# Patient Record
Sex: MALE | Race: BLACK OR AFRICAN AMERICAN | NOT HISPANIC OR LATINO | Employment: UNEMPLOYED | URBAN - METROPOLITAN AREA
[De-identification: names, ages, dates, MRNs, and addresses within clinical notes are randomized per-mention and may not be internally consistent; named-entity substitution may affect disease eponyms.]

---

## 2017-01-11 ENCOUNTER — ALLSCRIPTS OFFICE VISIT (OUTPATIENT)
Dept: OTHER | Facility: OTHER | Age: 37
End: 2017-01-11

## 2017-01-12 ENCOUNTER — ALLSCRIPTS OFFICE VISIT (OUTPATIENT)
Dept: OTHER | Facility: OTHER | Age: 37
End: 2017-01-12

## 2017-01-19 ENCOUNTER — HOSPITAL ENCOUNTER (OUTPATIENT)
Dept: RADIOLOGY | Facility: HOSPITAL | Age: 37
Discharge: HOME | End: 2017-01-19
Attending: INTERNAL MEDICINE | Admitting: INTERNAL MEDICINE
Payer: COMMERCIAL

## 2017-01-23 ENCOUNTER — GENERIC CONVERSION - ENCOUNTER (OUTPATIENT)
Dept: OTHER | Facility: OTHER | Age: 37
End: 2017-01-23

## 2017-02-08 ENCOUNTER — ALLSCRIPTS OFFICE VISIT (OUTPATIENT)
Dept: OTHER | Facility: OTHER | Age: 37
End: 2017-02-08

## 2017-02-16 ENCOUNTER — GENERIC CONVERSION - ENCOUNTER (OUTPATIENT)
Dept: OTHER | Facility: OTHER | Age: 37
End: 2017-02-16

## 2017-03-08 ENCOUNTER — ALLSCRIPTS OFFICE VISIT (OUTPATIENT)
Dept: OTHER | Facility: OTHER | Age: 37
End: 2017-03-08

## 2017-03-30 ENCOUNTER — HOSPITAL ENCOUNTER (EMERGENCY)
Facility: HOSPITAL | Age: 37
Discharge: HOME | End: 2017-03-30
Attending: EMERGENCY MEDICINE | Admitting: EMERGENCY MEDICINE
Payer: COMMERCIAL

## 2017-03-30 VITALS
OXYGEN SATURATION: 98 % | DIASTOLIC BLOOD PRESSURE: 83 MMHG | TEMPERATURE: 98 F | SYSTOLIC BLOOD PRESSURE: 134 MMHG | WEIGHT: 178.57 LBS | HEART RATE: 70 BPM | BODY MASS INDEX: 21.74 KG/M2 | RESPIRATION RATE: 18 BRPM

## 2017-03-30 DIAGNOSIS — F19.20 DEPENDENCY ON PAIN MEDICATION (HCC): Primary | ICD-10-CM

## 2017-03-30 PROCEDURE — 99283 EMERGENCY DEPT VISIT LOW MDM: CPT

## 2017-03-30 RX ORDER — FENTANYL 50 UG/H
1 PATCH TRANSDERMAL
COMMUNITY
End: 2017-11-06 | Stop reason: HOSPADM

## 2017-03-31 ENCOUNTER — GENERIC CONVERSION - ENCOUNTER (OUTPATIENT)
Dept: OTHER | Facility: OTHER | Age: 37
End: 2017-03-31

## 2017-04-03 ENCOUNTER — ALLSCRIPTS OFFICE VISIT (OUTPATIENT)
Dept: OTHER | Facility: OTHER | Age: 37
End: 2017-04-03

## 2017-04-26 ENCOUNTER — APPOINTMENT (OUTPATIENT)
Dept: LAB | Facility: CLINIC | Age: 37
End: 2017-04-26
Payer: COMMERCIAL

## 2017-04-26 ENCOUNTER — ALLSCRIPTS OFFICE VISIT (OUTPATIENT)
Dept: OTHER | Facility: OTHER | Age: 37
End: 2017-04-26

## 2017-04-26 ENCOUNTER — TRANSCRIBE ORDERS (OUTPATIENT)
Dept: LAB | Facility: CLINIC | Age: 37
End: 2017-04-26

## 2017-04-26 DIAGNOSIS — C34.91 MALIGNANT NEOPLASM OF UNSPECIFIED PART OF RIGHT BRONCHUS OR LUNG (HCC): ICD-10-CM

## 2017-04-26 LAB
ALBUMIN SERPL BCP-MCNC: 3.6 G/DL (ref 3.5–5)
ALP SERPL-CCNC: 131 U/L (ref 46–116)
ALT SERPL W P-5'-P-CCNC: 38 U/L (ref 12–78)
ANION GAP SERPL CALCULATED.3IONS-SCNC: 8 MMOL/L (ref 4–13)
AST SERPL W P-5'-P-CCNC: 27 U/L (ref 5–45)
BASOPHILS # BLD AUTO: 0.03 THOUSANDS/ΜL (ref 0–0.1)
BASOPHILS NFR BLD AUTO: 0 % (ref 0–1)
BILIRUB SERPL-MCNC: 0.2 MG/DL (ref 0.2–1)
BUN SERPL-MCNC: 23 MG/DL (ref 5–25)
CALCIUM SERPL-MCNC: 9.1 MG/DL (ref 8.3–10.1)
CHLORIDE SERPL-SCNC: 103 MMOL/L (ref 100–108)
CO2 SERPL-SCNC: 28 MMOL/L (ref 21–32)
CREAT SERPL-MCNC: 1.07 MG/DL (ref 0.6–1.3)
EOSINOPHIL # BLD AUTO: 0.3 THOUSAND/ΜL (ref 0–0.61)
EOSINOPHIL NFR BLD AUTO: 4 % (ref 0–6)
ERYTHROCYTE [DISTWIDTH] IN BLOOD BY AUTOMATED COUNT: 18.6 % (ref 11.6–15.1)
GFR SERPL CREATININE-BSD FRML MDRD: >60 ML/MIN/1.73SQ M
GLUCOSE SERPL-MCNC: 98 MG/DL (ref 65–140)
HCT VFR BLD AUTO: 36.8 % (ref 36.5–49.3)
HGB BLD-MCNC: 12.1 G/DL (ref 12–17)
LYMPHOCYTES # BLD AUTO: 2.84 THOUSANDS/ΜL (ref 0.6–4.47)
LYMPHOCYTES NFR BLD AUTO: 39 % (ref 14–44)
MCH RBC QN AUTO: 22.8 PG (ref 26.8–34.3)
MCHC RBC AUTO-ENTMCNC: 32.9 G/DL (ref 31.4–37.4)
MCV RBC AUTO: 69 FL (ref 82–98)
MONOCYTES # BLD AUTO: 0.82 THOUSAND/ΜL (ref 0.17–1.22)
MONOCYTES NFR BLD AUTO: 11 % (ref 4–12)
NEUTROPHILS # BLD AUTO: 3.37 THOUSANDS/ΜL (ref 1.85–7.62)
NEUTS SEG NFR BLD AUTO: 46 % (ref 43–75)
PLATELET # BLD AUTO: 252 THOUSANDS/UL (ref 149–390)
PMV BLD AUTO: 11 FL (ref 8.9–12.7)
POTASSIUM SERPL-SCNC: 4 MMOL/L (ref 3.5–5.3)
PROT SERPL-MCNC: 7.5 G/DL (ref 6.4–8.2)
RBC # BLD AUTO: 5.3 MILLION/UL (ref 3.88–5.62)
SODIUM SERPL-SCNC: 139 MMOL/L (ref 136–145)
WBC # BLD AUTO: 7.36 THOUSAND/UL (ref 4.31–10.16)

## 2017-04-26 PROCEDURE — 80053 COMPREHEN METABOLIC PANEL: CPT

## 2017-04-26 PROCEDURE — 85025 COMPLETE CBC W/AUTO DIFF WBC: CPT

## 2017-04-26 PROCEDURE — 36415 COLL VENOUS BLD VENIPUNCTURE: CPT

## 2017-04-28 ENCOUNTER — HOSPITAL ENCOUNTER (EMERGENCY)
Facility: HOSPITAL | Age: 37
Discharge: HOME | End: 2017-04-28
Attending: EMERGENCY MEDICINE | Admitting: EMERGENCY MEDICINE
Payer: COMMERCIAL

## 2017-04-28 ENCOUNTER — APPOINTMENT (EMERGENCY)
Dept: CT IMAGING | Facility: HOSPITAL | Age: 37
End: 2017-04-28
Payer: COMMERCIAL

## 2017-04-28 VITALS
WEIGHT: 182.98 LBS | OXYGEN SATURATION: 100 % | HEIGHT: 77 IN | SYSTOLIC BLOOD PRESSURE: 129 MMHG | RESPIRATION RATE: 18 BRPM | DIASTOLIC BLOOD PRESSURE: 73 MMHG | BODY MASS INDEX: 21.61 KG/M2 | TEMPERATURE: 98.7 F | HEART RATE: 62 BPM

## 2017-04-28 DIAGNOSIS — C79.70: ICD-10-CM

## 2017-04-28 DIAGNOSIS — R10.12 ABDOMINAL PAIN, LEFT UPPER QUADRANT: Primary | ICD-10-CM

## 2017-04-28 LAB
ALBUMIN SERPL BCP-MCNC: 3.9 G/DL (ref 3.5–5)
ALP SERPL-CCNC: 143 U/L (ref 46–116)
ALT SERPL W P-5'-P-CCNC: 39 U/L (ref 12–78)
ANION GAP SERPL CALCULATED.3IONS-SCNC: 9 MMOL/L (ref 4–13)
APTT PPP: 29 SECONDS (ref 23–35)
AST SERPL W P-5'-P-CCNC: 55 U/L (ref 5–45)
BASOPHILS # BLD AUTO: 0.03 THOUSANDS/ΜL (ref 0–0.1)
BASOPHILS NFR BLD AUTO: 0 % (ref 0–1)
BILIRUB DIRECT SERPL-MCNC: 0.03 MG/DL (ref 0–0.2)
BILIRUB SERPL-MCNC: 0.5 MG/DL (ref 0.2–1)
BUN SERPL-MCNC: 17 MG/DL (ref 5–25)
CALCIUM SERPL-MCNC: 9.8 MG/DL (ref 8.3–10.1)
CHLORIDE SERPL-SCNC: 98 MMOL/L (ref 100–108)
CO2 SERPL-SCNC: 27 MMOL/L (ref 21–32)
CREAT SERPL-MCNC: 0.69 MG/DL (ref 0.6–1.3)
EOSINOPHIL # BLD AUTO: 0.11 THOUSAND/ΜL (ref 0–0.61)
EOSINOPHIL NFR BLD AUTO: 2 % (ref 0–6)
ERYTHROCYTE [DISTWIDTH] IN BLOOD BY AUTOMATED COUNT: 18.2 % (ref 11.6–15.1)
GFR SERPL CREATININE-BSD FRML MDRD: >60 ML/MIN/1.73SQ M
GLUCOSE SERPL-MCNC: 93 MG/DL (ref 65–140)
HCT VFR BLD AUTO: 40.2 % (ref 36.5–49.3)
HGB BLD-MCNC: 13.2 G/DL (ref 12–17)
INR PPP: 0.98 (ref 0.86–1.16)
LACTATE SERPL-SCNC: 1 MMOL/L (ref 0.5–2)
LIPASE SERPL-CCNC: 94 U/L (ref 73–393)
LYMPHOCYTES # BLD AUTO: 1.66 THOUSANDS/ΜL (ref 0.6–4.47)
LYMPHOCYTES NFR BLD AUTO: 23 % (ref 14–44)
MCH RBC QN AUTO: 22.6 PG (ref 26.8–34.3)
MCHC RBC AUTO-ENTMCNC: 32.8 G/DL (ref 31.4–37.4)
MCV RBC AUTO: 69 FL (ref 82–98)
MONOCYTES # BLD AUTO: 0.63 THOUSAND/ΜL (ref 0.17–1.22)
MONOCYTES NFR BLD AUTO: 9 % (ref 4–12)
NEUTROPHILS # BLD AUTO: 4.79 THOUSANDS/ΜL (ref 1.85–7.62)
NEUTS SEG NFR BLD AUTO: 66 % (ref 43–75)
PLATELET # BLD AUTO: 260 THOUSANDS/UL (ref 149–390)
PMV BLD AUTO: 11.3 FL (ref 8.9–12.7)
POTASSIUM SERPL-SCNC: 6 MMOL/L (ref 3.5–5.3)
PROT SERPL-MCNC: 8.5 G/DL (ref 6.4–8.2)
PROTHROMBIN TIME: 13.3 SECONDS (ref 12.1–14.4)
RBC # BLD AUTO: 5.84 MILLION/UL (ref 3.88–5.62)
SODIUM SERPL-SCNC: 134 MMOL/L (ref 136–145)
WBC # BLD AUTO: 7.22 THOUSAND/UL (ref 4.31–10.16)

## 2017-04-28 PROCEDURE — 96375 TX/PRO/DX INJ NEW DRUG ADDON: CPT

## 2017-04-28 PROCEDURE — 96374 THER/PROPH/DIAG INJ IV PUSH: CPT

## 2017-04-28 PROCEDURE — 85730 THROMBOPLASTIN TIME PARTIAL: CPT | Performed by: EMERGENCY MEDICINE

## 2017-04-28 PROCEDURE — 80076 HEPATIC FUNCTION PANEL: CPT | Performed by: EMERGENCY MEDICINE

## 2017-04-28 PROCEDURE — 87040 BLOOD CULTURE FOR BACTERIA: CPT | Performed by: EMERGENCY MEDICINE

## 2017-04-28 PROCEDURE — 96361 HYDRATE IV INFUSION ADD-ON: CPT

## 2017-04-28 PROCEDURE — 96376 TX/PRO/DX INJ SAME DRUG ADON: CPT

## 2017-04-28 PROCEDURE — 74177 CT ABD & PELVIS W/CONTRAST: CPT

## 2017-04-28 PROCEDURE — 36415 COLL VENOUS BLD VENIPUNCTURE: CPT | Performed by: EMERGENCY MEDICINE

## 2017-04-28 PROCEDURE — 83690 ASSAY OF LIPASE: CPT | Performed by: EMERGENCY MEDICINE

## 2017-04-28 PROCEDURE — 83605 ASSAY OF LACTIC ACID: CPT | Performed by: EMERGENCY MEDICINE

## 2017-04-28 PROCEDURE — 80048 BASIC METABOLIC PNL TOTAL CA: CPT | Performed by: EMERGENCY MEDICINE

## 2017-04-28 PROCEDURE — 85025 COMPLETE CBC W/AUTO DIFF WBC: CPT | Performed by: EMERGENCY MEDICINE

## 2017-04-28 PROCEDURE — 99284 EMERGENCY DEPT VISIT MOD MDM: CPT

## 2017-04-28 PROCEDURE — 85610 PROTHROMBIN TIME: CPT | Performed by: EMERGENCY MEDICINE

## 2017-04-28 RX ORDER — ONDANSETRON 2 MG/ML
4 INJECTION INTRAMUSCULAR; INTRAVENOUS ONCE
Status: COMPLETED | OUTPATIENT
Start: 2017-04-28 | End: 2017-04-28

## 2017-04-28 RX ORDER — MAGNESIUM HYDROXIDE/ALUMINUM HYDROXICE/SIMETHICONE 120; 1200; 1200 MG/30ML; MG/30ML; MG/30ML
30 SUSPENSION ORAL ONCE
Status: COMPLETED | OUTPATIENT
Start: 2017-04-28 | End: 2017-04-28

## 2017-04-28 RX ADMIN — ONDANSETRON 4 MG: 2 INJECTION INTRAMUSCULAR; INTRAVENOUS at 10:38

## 2017-04-28 RX ADMIN — HYDROMORPHONE HYDROCHLORIDE 1 MG: 1 INJECTION, SOLUTION INTRAMUSCULAR; INTRAVENOUS; SUBCUTANEOUS at 12:03

## 2017-04-28 RX ADMIN — LIDOCAINE HYDROCHLORIDE 15 ML: 20 SOLUTION ORAL; TOPICAL at 12:03

## 2017-04-28 RX ADMIN — IOHEXOL 100 ML: 350 INJECTION, SOLUTION INTRAVENOUS at 11:10

## 2017-04-28 RX ADMIN — HYDROMORPHONE HYDROCHLORIDE 1 MG: 1 INJECTION, SOLUTION INTRAMUSCULAR; INTRAVENOUS; SUBCUTANEOUS at 10:38

## 2017-04-28 RX ADMIN — SODIUM CHLORIDE 1000 ML: 0.9 INJECTION, SOLUTION INTRAVENOUS at 10:35

## 2017-04-28 RX ADMIN — ALUMINUM HYDROXIDE, MAGNESIUM HYDROXIDE, AND SIMETHICONE 30 ML: 200; 200; 20 SUSPENSION ORAL at 12:02

## 2017-04-30 ENCOUNTER — APPOINTMENT (EMERGENCY)
Dept: CT IMAGING | Facility: HOSPITAL | Age: 37
End: 2017-04-30
Payer: COMMERCIAL

## 2017-04-30 ENCOUNTER — HOSPITAL ENCOUNTER (OUTPATIENT)
Facility: HOSPITAL | Age: 37
Setting detail: OBSERVATION
Discharge: HOME | End: 2017-04-30
Attending: EMERGENCY MEDICINE | Admitting: FAMILY MEDICINE
Payer: COMMERCIAL

## 2017-04-30 VITALS
RESPIRATION RATE: 16 BRPM | BODY MASS INDEX: 21.72 KG/M2 | WEIGHT: 183.2 LBS | TEMPERATURE: 99.1 F | OXYGEN SATURATION: 98 % | SYSTOLIC BLOOD PRESSURE: 131 MMHG | HEART RATE: 78 BPM | DIASTOLIC BLOOD PRESSURE: 75 MMHG

## 2017-04-30 DIAGNOSIS — K52.9 GASTROENTERITIS: Primary | ICD-10-CM

## 2017-04-30 LAB
ALBUMIN SERPL BCP-MCNC: 4.3 G/DL (ref 3.5–5)
ALP SERPL-CCNC: 154 U/L (ref 46–116)
ALT SERPL W P-5'-P-CCNC: 34 U/L (ref 12–78)
ANION GAP SERPL CALCULATED.3IONS-SCNC: 11 MMOL/L (ref 4–13)
AST SERPL W P-5'-P-CCNC: 39 U/L (ref 5–45)
BASOPHILS # BLD AUTO: 0.03 THOUSANDS/ΜL (ref 0–0.1)
BASOPHILS NFR BLD AUTO: 0 % (ref 0–1)
BILIRUB SERPL-MCNC: 0.4 MG/DL (ref 0.2–1)
BUN SERPL-MCNC: 12 MG/DL (ref 5–25)
CALCIUM SERPL-MCNC: 10 MG/DL (ref 8.3–10.1)
CHLORIDE SERPL-SCNC: 99 MMOL/L (ref 100–108)
CO2 SERPL-SCNC: 28 MMOL/L (ref 21–32)
CREAT SERPL-MCNC: 0.89 MG/DL (ref 0.6–1.3)
EOSINOPHIL # BLD AUTO: 0.19 THOUSAND/ΜL (ref 0–0.61)
EOSINOPHIL NFR BLD AUTO: 2 % (ref 0–6)
ERYTHROCYTE [DISTWIDTH] IN BLOOD BY AUTOMATED COUNT: 18.9 % (ref 11.6–15.1)
GFR SERPL CREATININE-BSD FRML MDRD: >60 ML/MIN/1.73SQ M
GLUCOSE SERPL-MCNC: 86 MG/DL (ref 65–140)
HCT VFR BLD AUTO: 41.2 % (ref 36.5–49.3)
HGB BLD-MCNC: 13.8 G/DL (ref 12–17)
HOLD SPECIMEN: NORMAL
HOLD SPECIMEN: NORMAL
HOLD SPECIMEN: YES
LIPASE SERPL-CCNC: 81 U/L (ref 73–393)
LYMPHOCYTES # BLD AUTO: 2.55 THOUSANDS/ΜL (ref 0.6–4.47)
LYMPHOCYTES NFR BLD AUTO: 29 % (ref 14–44)
MCH RBC QN AUTO: 23.1 PG (ref 26.8–34.3)
MCHC RBC AUTO-ENTMCNC: 33.5 G/DL (ref 31.4–37.4)
MCV RBC AUTO: 69 FL (ref 82–98)
MONOCYTES # BLD AUTO: 0.96 THOUSAND/ΜL (ref 0.17–1.22)
MONOCYTES NFR BLD AUTO: 11 % (ref 4–12)
NEUTROPHILS # BLD AUTO: 5.18 THOUSANDS/ΜL (ref 1.85–7.62)
NEUTS SEG NFR BLD AUTO: 58 % (ref 43–75)
PLATELET # BLD AUTO: 276 THOUSANDS/UL (ref 149–390)
PMV BLD AUTO: 10.2 FL (ref 8.9–12.7)
POTASSIUM SERPL-SCNC: 4.1 MMOL/L (ref 3.5–5.3)
PROT SERPL-MCNC: 8.8 G/DL (ref 6.4–8.2)
RBC # BLD AUTO: 5.98 MILLION/UL (ref 3.88–5.62)
SODIUM SERPL-SCNC: 138 MMOL/L (ref 136–145)
WBC # BLD AUTO: 8.91 THOUSAND/UL (ref 4.31–10.16)

## 2017-04-30 PROCEDURE — 96375 TX/PRO/DX INJ NEW DRUG ADDON: CPT

## 2017-04-30 PROCEDURE — 87899 AGENT NOS ASSAY W/OPTIC: CPT | Performed by: EMERGENCY MEDICINE

## 2017-04-30 PROCEDURE — 87015 SPECIMEN INFECT AGNT CONCNTJ: CPT | Performed by: EMERGENCY MEDICINE

## 2017-04-30 PROCEDURE — 99285 EMERGENCY DEPT VISIT HI MDM: CPT

## 2017-04-30 PROCEDURE — 80053 COMPREHEN METABOLIC PANEL: CPT | Performed by: EMERGENCY MEDICINE

## 2017-04-30 PROCEDURE — 83690 ASSAY OF LIPASE: CPT | Performed by: EMERGENCY MEDICINE

## 2017-04-30 PROCEDURE — 74177 CT ABD & PELVIS W/CONTRAST: CPT

## 2017-04-30 PROCEDURE — 85025 COMPLETE CBC W/AUTO DIFF WBC: CPT | Performed by: EMERGENCY MEDICINE

## 2017-04-30 PROCEDURE — 87045 FECES CULTURE AEROBIC BACT: CPT | Performed by: EMERGENCY MEDICINE

## 2017-04-30 PROCEDURE — 36415 COLL VENOUS BLD VENIPUNCTURE: CPT

## 2017-04-30 PROCEDURE — 87493 C DIFF AMPLIFIED PROBE: CPT | Performed by: EMERGENCY MEDICINE

## 2017-04-30 PROCEDURE — 87046 STOOL CULTR AEROBIC BACT EA: CPT | Performed by: EMERGENCY MEDICINE

## 2017-04-30 PROCEDURE — 89055 LEUKOCYTE ASSESSMENT FECAL: CPT | Performed by: EMERGENCY MEDICINE

## 2017-04-30 PROCEDURE — 87205 SMEAR GRAM STAIN: CPT | Performed by: EMERGENCY MEDICINE

## 2017-04-30 PROCEDURE — 96374 THER/PROPH/DIAG INJ IV PUSH: CPT

## 2017-04-30 RX ORDER — PROMETHAZINE HYDROCHLORIDE 25 MG/ML
12.5 INJECTION, SOLUTION INTRAMUSCULAR; INTRAVENOUS ONCE
Status: COMPLETED | OUTPATIENT
Start: 2017-04-30 | End: 2017-04-30

## 2017-04-30 RX ORDER — PROMETHAZINE HYDROCHLORIDE 25 MG/ML
12.5 INJECTION, SOLUTION INTRAMUSCULAR; INTRAVENOUS EVERY 6 HOURS PRN
Status: DISCONTINUED | OUTPATIENT
Start: 2017-04-30 | End: 2017-04-30

## 2017-04-30 RX ORDER — PROMETHAZINE HYDROCHLORIDE 25 MG/1
25 TABLET ORAL EVERY 6 HOURS PRN
Qty: 30 TABLET | Refills: 0 | Status: SHIPPED | OUTPATIENT
Start: 2017-04-30 | End: 2017-07-21 | Stop reason: ALTCHOICE

## 2017-04-30 RX ORDER — MIRTAZAPINE 7.5 MG/1
7.5 TABLET, FILM COATED ORAL
COMMUNITY
End: 2017-07-21 | Stop reason: ALTCHOICE

## 2017-04-30 RX ORDER — PROMETHAZINE HYDROCHLORIDE 25 MG/ML
12.5 INJECTION, SOLUTION INTRAMUSCULAR; INTRAVENOUS ONCE
Status: DISCONTINUED | OUTPATIENT
Start: 2017-04-30 | End: 2017-04-30

## 2017-04-30 RX ORDER — PROMETHAZINE HYDROCHLORIDE 6.25 MG/5ML
12.5 SYRUP ORAL ONCE
Status: COMPLETED | OUTPATIENT
Start: 2017-04-30 | End: 2017-04-30

## 2017-04-30 RX ADMIN — IOHEXOL 100 ML: 350 INJECTION, SOLUTION INTRAVENOUS at 19:50

## 2017-04-30 RX ADMIN — PROMETHAZINE HYDROCHLORIDE 12.5 MG: 25 INJECTION INTRAMUSCULAR; INTRAVENOUS at 17:26

## 2017-04-30 RX ADMIN — HYDROMORPHONE HYDROCHLORIDE 0.5 MG: 1 INJECTION, SOLUTION INTRAMUSCULAR; INTRAVENOUS; SUBCUTANEOUS at 17:39

## 2017-04-30 RX ADMIN — PROMETHAZINE HYDROCHLORIDE 12.5 MG: 6.25 SOLUTION ORAL at 19:30

## 2017-04-30 RX ADMIN — IOHEXOL 50 ML: 240 INJECTION, SOLUTION INTRATHECAL; INTRAVASCULAR; INTRAVENOUS; ORAL at 18:00

## 2017-05-01 ENCOUNTER — ALLSCRIPTS OFFICE VISIT (OUTPATIENT)
Dept: OTHER | Facility: OTHER | Age: 37
End: 2017-05-01

## 2017-05-01 LAB
C DIFF TOX GENS STL QL NAA+PROBE: NORMAL
WBC STL QL MICRO: NORMAL

## 2017-05-02 RX ORDER — SODIUM CHLORIDE 9 MG/ML
75 INJECTION, SOLUTION INTRAVENOUS CONTINUOUS
Status: CANCELLED | OUTPATIENT
Start: 2017-05-02

## 2017-05-03 LAB
BACTERIA BLD CULT: NORMAL
BACTERIA BLD CULT: NORMAL
BACTERIA STL CULT: NORMAL
BACTERIA STL CULT: NORMAL

## 2017-05-04 LAB — WBC SPEC QL GRAM STN: NORMAL

## 2017-05-09 ENCOUNTER — HOSPITAL ENCOUNTER (OUTPATIENT)
Dept: CT IMAGING | Facility: HOSPITAL | Age: 37
Discharge: HOME | End: 2017-05-09
Attending: INTERNAL MEDICINE
Payer: COMMERCIAL

## 2017-06-01 ENCOUNTER — ALLSCRIPTS OFFICE VISIT (OUTPATIENT)
Dept: OTHER | Facility: OTHER | Age: 37
End: 2017-06-01

## 2017-06-29 ENCOUNTER — ALLSCRIPTS OFFICE VISIT (OUTPATIENT)
Dept: OTHER | Facility: OTHER | Age: 37
End: 2017-06-29

## 2017-07-21 ENCOUNTER — APPOINTMENT (INPATIENT)
Dept: CT IMAGING | Facility: HOSPITAL | Age: 37
DRG: 861 | End: 2017-07-21
Payer: COMMERCIAL

## 2017-07-21 ENCOUNTER — APPOINTMENT (EMERGENCY)
Dept: RADIOLOGY | Facility: HOSPITAL | Age: 37
DRG: 861 | End: 2017-07-21
Payer: COMMERCIAL

## 2017-07-21 ENCOUNTER — ANESTHESIA EVENT (OUTPATIENT)
Dept: GASTROENTEROLOGY | Facility: HOSPITAL | Age: 37
End: 2017-07-21

## 2017-07-21 ENCOUNTER — HOSPITAL ENCOUNTER (INPATIENT)
Facility: HOSPITAL | Age: 37
LOS: 1 days | Discharge: LEFT BEFORE DISCHARGE | DRG: 861 | End: 2017-07-22
Attending: EMERGENCY MEDICINE | Admitting: HOSPITALIST
Payer: COMMERCIAL

## 2017-07-21 ENCOUNTER — APPOINTMENT (EMERGENCY)
Dept: CT IMAGING | Facility: HOSPITAL | Age: 37
DRG: 861 | End: 2017-07-21
Payer: COMMERCIAL

## 2017-07-21 DIAGNOSIS — K62.5 BLOOD PER RECTUM: ICD-10-CM

## 2017-07-21 DIAGNOSIS — G89.3 CANCER RELATED PAIN: ICD-10-CM

## 2017-07-21 DIAGNOSIS — R11.2 NAUSEA AND VOMITING: Primary | ICD-10-CM

## 2017-07-21 PROBLEM — M25.50 ARTHRALGIA: Status: ACTIVE | Noted: 2017-07-21

## 2017-07-21 PROBLEM — R10.9 ABDOMINAL PAIN: Status: ACTIVE | Noted: 2017-07-21

## 2017-07-21 PROBLEM — C79.70 MALIGNANT NEOPLASM METASTATIC TO ADRENAL GLAND (HCC): Status: ACTIVE | Noted: 2017-07-21

## 2017-07-21 PROBLEM — M79.2 NEUROPATHIC PAIN SYNDROME (NON-HERPETIC): Status: ACTIVE | Noted: 2017-07-21

## 2017-07-21 PROBLEM — F32.A DEPRESSION: Status: ACTIVE | Noted: 2017-07-21

## 2017-07-21 PROBLEM — F11.20 CONTINUOUS OPIOID DEPENDENCE (HCC): Status: ACTIVE | Noted: 2017-07-21

## 2017-07-21 PROBLEM — G47.00 INSOMNIA: Status: ACTIVE | Noted: 2017-07-21

## 2017-07-21 PROBLEM — R76.8 ELEVATED RHEUMATOID FACTOR: Status: ACTIVE | Noted: 2017-07-21

## 2017-07-21 PROBLEM — R63.0 DECREASE IN APPETITE: Status: ACTIVE | Noted: 2017-07-21

## 2017-07-21 PROBLEM — F41.9 ANXIETY: Status: ACTIVE | Noted: 2017-07-21

## 2017-07-21 PROBLEM — Z51.5 UNDER CARE OF PALLIATIVE CARE PHYSICIAN: Status: ACTIVE | Noted: 2017-07-21

## 2017-07-21 PROBLEM — D62 ACUTE BLOOD LOSS ANEMIA: Status: ACTIVE | Noted: 2017-07-21

## 2017-07-21 PROBLEM — M54.9 BACK PAIN: Status: ACTIVE | Noted: 2017-07-21

## 2017-07-21 PROBLEM — C34.90 ADENOCARCINOMA OF LUNG (HCC): Status: ACTIVE | Noted: 2017-07-21

## 2017-07-21 PROBLEM — R11.0 NAUSEA: Status: ACTIVE | Noted: 2017-07-21

## 2017-07-21 LAB
ABO GROUP BLD: NORMAL
ALBUMIN SERPL BCP-MCNC: 3.4 G/DL (ref 3.5–5)
ALP SERPL-CCNC: 127 U/L (ref 46–116)
ALT SERPL W P-5'-P-CCNC: 30 U/L (ref 12–78)
ANION GAP SERPL CALCULATED.3IONS-SCNC: 8 MMOL/L (ref 4–13)
AST SERPL W P-5'-P-CCNC: 27 U/L (ref 5–45)
BASOPHILS # BLD AUTO: 0.02 THOUSANDS/ΜL (ref 0–0.1)
BASOPHILS NFR BLD AUTO: 0 % (ref 0–1)
BILIRUB SERPL-MCNC: 0.2 MG/DL (ref 0.2–1)
BILIRUB UR QL STRIP: NEGATIVE
BLD GP AB SCN SERPL QL: NEGATIVE
BUN SERPL-MCNC: 13 MG/DL (ref 5–25)
CALCIUM SERPL-MCNC: 9.5 MG/DL (ref 8.3–10.1)
CHLORIDE SERPL-SCNC: 102 MMOL/L (ref 100–108)
CLARITY UR: CLEAR
CO2 SERPL-SCNC: 28 MMOL/L (ref 21–32)
COLOR UR: YELLOW
CREAT SERPL-MCNC: 0.82 MG/DL (ref 0.6–1.3)
EOSINOPHIL # BLD AUTO: 0.18 THOUSAND/ΜL (ref 0–0.61)
EOSINOPHIL NFR BLD AUTO: 3 % (ref 0–6)
ERYTHROCYTE [DISTWIDTH] IN BLOOD BY AUTOMATED COUNT: 17.7 % (ref 11.6–15.1)
GFR SERPL CREATININE-BSD FRML MDRD: >60 ML/MIN/1.73SQ M
GLUCOSE SERPL-MCNC: 93 MG/DL (ref 65–140)
GLUCOSE UR STRIP-MCNC: NEGATIVE MG/DL
HCT VFR BLD AUTO: 34.7 % (ref 36.5–49.3)
HCT VFR BLD AUTO: 35.2 % (ref 36.5–49.3)
HGB BLD-MCNC: 11.4 G/DL (ref 12–17)
HGB BLD-MCNC: 11.7 G/DL (ref 12–17)
HGB UR QL STRIP.AUTO: NEGATIVE
KETONES UR STRIP-MCNC: NEGATIVE MG/DL
LACTATE SERPL-SCNC: 1.7 MMOL/L (ref 0.5–2)
LEUKOCYTE ESTERASE UR QL STRIP: NEGATIVE
LIPASE SERPL-CCNC: 213 U/L (ref 73–393)
LYMPHOCYTES # BLD AUTO: 1.66 THOUSANDS/ΜL (ref 0.6–4.47)
LYMPHOCYTES NFR BLD AUTO: 30 % (ref 14–44)
MCH RBC QN AUTO: 22.6 PG (ref 26.8–34.3)
MCHC RBC AUTO-ENTMCNC: 32.9 G/DL (ref 31.4–37.4)
MCV RBC AUTO: 69 FL (ref 82–98)
MONOCYTES # BLD AUTO: 0.43 THOUSAND/ΜL (ref 0.17–1.22)
MONOCYTES NFR BLD AUTO: 8 % (ref 4–12)
NEUTROPHILS # BLD AUTO: 3.23 THOUSANDS/ΜL (ref 1.85–7.62)
NEUTS SEG NFR BLD AUTO: 59 % (ref 43–75)
NITRITE UR QL STRIP: NEGATIVE
PH UR STRIP.AUTO: 7.5 [PH] (ref 4.5–8)
PLATELET # BLD AUTO: 329 THOUSANDS/UL (ref 149–390)
PMV BLD AUTO: 9.6 FL (ref 8.9–12.7)
POTASSIUM SERPL-SCNC: 4.1 MMOL/L (ref 3.5–5.3)
PROT SERPL-MCNC: 7.7 G/DL (ref 6.4–8.2)
PROT UR STRIP-MCNC: NEGATIVE MG/DL
RBC # BLD AUTO: 5.05 MILLION/UL (ref 3.88–5.62)
RH BLD: POSITIVE
SODIUM SERPL-SCNC: 138 MMOL/L (ref 136–145)
SP GR UR STRIP.AUTO: 1.02 (ref 1–1.03)
SPECIMEN EXPIRATION DATE: NORMAL
UROBILINOGEN UR QL STRIP.AUTO: 0.2 E.U./DL
WBC # BLD AUTO: 5.52 THOUSAND/UL (ref 4.31–10.16)

## 2017-07-21 PROCEDURE — 94760 N-INVAS EAR/PLS OXIMETRY 1: CPT

## 2017-07-21 PROCEDURE — 86900 BLOOD TYPING SEROLOGIC ABO: CPT | Performed by: PHYSICIAN ASSISTANT

## 2017-07-21 PROCEDURE — C9113 INJ PANTOPRAZOLE SODIUM, VIA: HCPCS | Performed by: FAMILY MEDICINE

## 2017-07-21 PROCEDURE — 99285 EMERGENCY DEPT VISIT HI MDM: CPT

## 2017-07-21 PROCEDURE — 85014 HEMATOCRIT: CPT | Performed by: PHYSICIAN ASSISTANT

## 2017-07-21 PROCEDURE — 83690 ASSAY OF LIPASE: CPT | Performed by: EMERGENCY MEDICINE

## 2017-07-21 PROCEDURE — 83605 ASSAY OF LACTIC ACID: CPT | Performed by: PHYSICIAN ASSISTANT

## 2017-07-21 PROCEDURE — 96376 TX/PRO/DX INJ SAME DRUG ADON: CPT

## 2017-07-21 PROCEDURE — 85018 HEMOGLOBIN: CPT | Performed by: PHYSICIAN ASSISTANT

## 2017-07-21 PROCEDURE — 86901 BLOOD TYPING SEROLOGIC RH(D): CPT | Performed by: PHYSICIAN ASSISTANT

## 2017-07-21 PROCEDURE — 82600 ASSAY OF CYANIDE: CPT | Performed by: FAMILY MEDICINE

## 2017-07-21 PROCEDURE — 96374 THER/PROPH/DIAG INJ IV PUSH: CPT

## 2017-07-21 PROCEDURE — 74022 RADEX COMPL AQT ABD SERIES: CPT

## 2017-07-21 PROCEDURE — 80053 COMPREHEN METABOLIC PANEL: CPT | Performed by: EMERGENCY MEDICINE

## 2017-07-21 PROCEDURE — 74176 CT ABD & PELVIS W/O CONTRAST: CPT

## 2017-07-21 PROCEDURE — 96375 TX/PRO/DX INJ NEW DRUG ADDON: CPT

## 2017-07-21 PROCEDURE — 36415 COLL VENOUS BLD VENIPUNCTURE: CPT | Performed by: EMERGENCY MEDICINE

## 2017-07-21 PROCEDURE — 85025 COMPLETE CBC W/AUTO DIFF WBC: CPT | Performed by: EMERGENCY MEDICINE

## 2017-07-21 PROCEDURE — 81003 URINALYSIS AUTO W/O SCOPE: CPT | Performed by: FAMILY MEDICINE

## 2017-07-21 PROCEDURE — 86850 RBC ANTIBODY SCREEN: CPT | Performed by: PHYSICIAN ASSISTANT

## 2017-07-21 PROCEDURE — C9113 INJ PANTOPRAZOLE SODIUM, VIA: HCPCS | Performed by: PHYSICIAN ASSISTANT

## 2017-07-21 PROCEDURE — 94762 N-INVAS EAR/PLS OXIMTRY CONT: CPT

## 2017-07-21 RX ORDER — GABAPENTIN 100 MG/1
100 CAPSULE ORAL 3 TIMES DAILY
COMMUNITY
End: 2017-11-06 | Stop reason: HOSPADM

## 2017-07-21 RX ORDER — PROMETHAZINE HYDROCHLORIDE 25 MG/ML
12.5 INJECTION, SOLUTION INTRAMUSCULAR; INTRAVENOUS ONCE
Status: COMPLETED | OUTPATIENT
Start: 2017-07-21 | End: 2017-07-21

## 2017-07-21 RX ORDER — PROMETHAZINE HYDROCHLORIDE 25 MG/1
25 TABLET ORAL EVERY 6 HOURS PRN
Qty: 15 TABLET | Refills: 0 | Status: SHIPPED | OUTPATIENT
Start: 2017-07-21 | End: 2017-07-22

## 2017-07-21 RX ORDER — HYDROMORPHONE HCL 110MG/55ML
2 PATIENT CONTROLLED ANALGESIA SYRINGE INTRAVENOUS
Status: DISCONTINUED | OUTPATIENT
Start: 2017-07-21 | End: 2017-07-21

## 2017-07-21 RX ORDER — OMEPRAZOLE 10 MG/1
10 CAPSULE, DELAYED RELEASE ORAL DAILY
COMMUNITY
End: 2017-10-26

## 2017-07-21 RX ORDER — NICOTINE 21 MG/24HR
14 PATCH, TRANSDERMAL 24 HOURS TRANSDERMAL DAILY
Status: DISCONTINUED | OUTPATIENT
Start: 2017-07-21 | End: 2017-07-22 | Stop reason: HOSPADM

## 2017-07-21 RX ORDER — PANTOPRAZOLE SODIUM 40 MG/1
40 INJECTION, POWDER, FOR SOLUTION INTRAVENOUS EVERY 12 HOURS SCHEDULED
Status: DISCONTINUED | OUTPATIENT
Start: 2017-07-21 | End: 2017-07-22 | Stop reason: HOSPADM

## 2017-07-21 RX ORDER — MAGNESIUM HYDROXIDE/ALUMINUM HYDROXICE/SIMETHICONE 120; 1200; 1200 MG/30ML; MG/30ML; MG/30ML
30 SUSPENSION ORAL EVERY 4 HOURS PRN
Status: DISCONTINUED | OUTPATIENT
Start: 2017-07-21 | End: 2017-07-22 | Stop reason: HOSPADM

## 2017-07-21 RX ORDER — HYDROMORPHONE HCL 110MG/55ML
1.5 PATIENT CONTROLLED ANALGESIA SYRINGE INTRAVENOUS
Status: DISCONTINUED | OUTPATIENT
Start: 2017-07-21 | End: 2017-07-22

## 2017-07-21 RX ORDER — SODIUM CHLORIDE 9 MG/ML
75 INJECTION, SOLUTION INTRAVENOUS CONTINUOUS
Status: DISCONTINUED | OUTPATIENT
Start: 2017-07-21 | End: 2017-07-21

## 2017-07-21 RX ORDER — PROMETHAZINE HYDROCHLORIDE 25 MG/ML
25 INJECTION, SOLUTION INTRAMUSCULAR; INTRAVENOUS EVERY 6 HOURS
Status: DISCONTINUED | OUTPATIENT
Start: 2017-07-21 | End: 2017-07-22 | Stop reason: HOSPADM

## 2017-07-21 RX ORDER — PANTOPRAZOLE SODIUM 40 MG/1
40 INJECTION, POWDER, FOR SOLUTION INTRAVENOUS ONCE
Status: COMPLETED | OUTPATIENT
Start: 2017-07-21 | End: 2017-07-21

## 2017-07-21 RX ORDER — FENTANYL 50 UG/H
50 PATCH TRANSDERMAL
Status: DISCONTINUED | OUTPATIENT
Start: 2017-07-21 | End: 2017-07-22 | Stop reason: HOSPADM

## 2017-07-21 RX ORDER — METOCLOPRAMIDE HYDROCHLORIDE 5 MG/ML
10 INJECTION INTRAMUSCULAR; INTRAVENOUS ONCE
Status: COMPLETED | OUTPATIENT
Start: 2017-07-21 | End: 2017-07-21

## 2017-07-21 RX ORDER — PROCHLORPERAZINE 25 MG
25 SUPPOSITORY, RECTAL RECTAL EVERY 12 HOURS PRN
Status: DISCONTINUED | OUTPATIENT
Start: 2017-07-21 | End: 2017-07-21

## 2017-07-21 RX ORDER — SODIUM CHLORIDE 9 MG/ML
125 INJECTION, SOLUTION INTRAVENOUS CONTINUOUS
Status: DISCONTINUED | OUTPATIENT
Start: 2017-07-21 | End: 2017-07-22 | Stop reason: HOSPADM

## 2017-07-21 RX ORDER — MAGNESIUM CARB/ALUMINUM HYDROX 105-160MG
296 TABLET,CHEWABLE ORAL ONCE
Status: DISCONTINUED | OUTPATIENT
Start: 2017-07-22 | End: 2017-07-21

## 2017-07-21 RX ORDER — MAGNESIUM CARB/ALUMINUM HYDROX 105-160MG
296 TABLET,CHEWABLE ORAL ONCE
Status: DISCONTINUED | OUTPATIENT
Start: 2017-07-21 | End: 2017-07-21

## 2017-07-21 RX ORDER — SODIUM CHLORIDE, SODIUM LACTATE, POTASSIUM CHLORIDE, CALCIUM CHLORIDE 600; 310; 30; 20 MG/100ML; MG/100ML; MG/100ML; MG/100ML
125 INJECTION, SOLUTION INTRAVENOUS CONTINUOUS
Status: CANCELLED | OUTPATIENT
Start: 2017-07-21

## 2017-07-21 RX ORDER — POLYETHYLENE GLYCOL 3350 17 G/17G
17 POWDER, FOR SOLUTION ORAL DAILY
Status: DISCONTINUED | OUTPATIENT
Start: 2017-07-21 | End: 2017-07-22 | Stop reason: HOSPADM

## 2017-07-21 RX ORDER — ACETAMINOPHEN 325 MG/1
650 TABLET ORAL EVERY 6 HOURS PRN
Status: DISCONTINUED | OUTPATIENT
Start: 2017-07-21 | End: 2017-07-22 | Stop reason: HOSPADM

## 2017-07-21 RX ADMIN — HYDROMORPHONE HYDROCHLORIDE 1 MG: 1 INJECTION, SOLUTION INTRAMUSCULAR; INTRAVENOUS; SUBCUTANEOUS at 11:16

## 2017-07-21 RX ADMIN — PROMETHAZINE HYDROCHLORIDE 25 MG: 25 INJECTION INTRAMUSCULAR; INTRAVENOUS at 13:50

## 2017-07-21 RX ADMIN — HYDROMORPHONE HYDROCHLORIDE 1 MG: 1 INJECTION, SOLUTION INTRAMUSCULAR; INTRAVENOUS; SUBCUTANEOUS at 10:28

## 2017-07-21 RX ADMIN — SODIUM CHLORIDE 125 ML/HR: 0.9 INJECTION, SOLUTION INTRAVENOUS at 14:36

## 2017-07-21 RX ADMIN — HYDROMORPHONE HYDROCHLORIDE 1 MG: 1 INJECTION, SOLUTION INTRAMUSCULAR; INTRAVENOUS; SUBCUTANEOUS at 13:47

## 2017-07-21 RX ADMIN — PROMETHAZINE HYDROCHLORIDE 12.5 MG: 25 INJECTION INTRAMUSCULAR; INTRAVENOUS at 12:10

## 2017-07-21 RX ADMIN — PANTOPRAZOLE SODIUM 40 MG: 40 INJECTION, POWDER, FOR SOLUTION INTRAVENOUS at 15:50

## 2017-07-21 RX ADMIN — NICOTINE 14 MG: 14 PATCH TRANSDERMAL at 22:41

## 2017-07-21 RX ADMIN — PROMETHAZINE HYDROCHLORIDE 25 MG: 25 INJECTION INTRAMUSCULAR; INTRAVENOUS at 18:45

## 2017-07-21 RX ADMIN — HYDROMORPHONE HYDROCHLORIDE 1 MG: 1 INJECTION, SOLUTION INTRAMUSCULAR; INTRAVENOUS; SUBCUTANEOUS at 13:05

## 2017-07-21 RX ADMIN — ALUMINUM HYDROXIDE, MAGNESIUM HYDROXIDE, AND SIMETHICONE 30 ML: 200; 200; 20 SUSPENSION ORAL at 18:32

## 2017-07-21 RX ADMIN — METOCLOPRAMIDE 10 MG: 5 INJECTION, SOLUTION INTRAMUSCULAR; INTRAVENOUS at 11:16

## 2017-07-21 RX ADMIN — PROMETHAZINE HYDROCHLORIDE 12.5 MG: 25 INJECTION INTRAMUSCULAR; INTRAVENOUS at 10:29

## 2017-07-21 RX ADMIN — POLYETHYLENE GLYCOL 3350 17 G: 17 POWDER, FOR SOLUTION ORAL at 17:09

## 2017-07-21 RX ADMIN — SODIUM CHLORIDE 125 ML/HR: 0.9 INJECTION, SOLUTION INTRAVENOUS at 23:23

## 2017-07-21 RX ADMIN — Medication: at 18:21

## 2017-07-21 RX ADMIN — HYDROMORPHONE HYDROCHLORIDE 2 MG: 2 INJECTION, SOLUTION INTRAMUSCULAR; INTRAVENOUS; SUBCUTANEOUS at 15:50

## 2017-07-21 RX ADMIN — HYDROMORPHONE HYDROCHLORIDE 2 MG: 2 INJECTION, SOLUTION INTRAMUSCULAR; INTRAVENOUS; SUBCUTANEOUS at 14:42

## 2017-07-21 RX ADMIN — PANTOPRAZOLE SODIUM 40 MG: 40 INJECTION, POWDER, FOR SOLUTION INTRAVENOUS at 22:41

## 2017-07-21 RX ADMIN — HYDROMORPHONE HYDROCHLORIDE 1.5 MG: 2 INJECTION, SOLUTION INTRAMUSCULAR; INTRAVENOUS; SUBCUTANEOUS at 18:58

## 2017-07-22 ENCOUNTER — ANESTHESIA (OUTPATIENT)
Dept: GASTROENTEROLOGY | Facility: HOSPITAL | Age: 37
End: 2017-07-22

## 2017-07-22 VITALS
DIASTOLIC BLOOD PRESSURE: 82 MMHG | SYSTOLIC BLOOD PRESSURE: 139 MMHG | TEMPERATURE: 97.9 F | RESPIRATION RATE: 18 BRPM | HEART RATE: 69 BPM | WEIGHT: 185.85 LBS | BODY MASS INDEX: 22.04 KG/M2 | OXYGEN SATURATION: 100 %

## 2017-07-22 LAB
ANION GAP SERPL CALCULATED.3IONS-SCNC: 9 MMOL/L (ref 4–13)
BUN SERPL-MCNC: 12 MG/DL (ref 5–25)
CALCIUM SERPL-MCNC: 9.4 MG/DL (ref 8.3–10.1)
CHLORIDE SERPL-SCNC: 103 MMOL/L (ref 100–108)
CO2 SERPL-SCNC: 26 MMOL/L (ref 21–32)
CREAT SERPL-MCNC: 0.77 MG/DL (ref 0.6–1.3)
ERYTHROCYTE [DISTWIDTH] IN BLOOD BY AUTOMATED COUNT: 17.7 % (ref 11.6–15.1)
GFR SERPL CREATININE-BSD FRML MDRD: >60 ML/MIN/1.73SQ M
GLUCOSE SERPL-MCNC: 100 MG/DL (ref 65–140)
HCT VFR BLD AUTO: 35.4 % (ref 36.5–49.3)
HCT VFR BLD AUTO: 36.3 % (ref 36.5–49.3)
HGB BLD-MCNC: 11.5 G/DL (ref 12–17)
HGB BLD-MCNC: 11.9 G/DL (ref 12–17)
MCH RBC QN AUTO: 22.5 PG (ref 26.8–34.3)
MCHC RBC AUTO-ENTMCNC: 32.8 G/DL (ref 31.4–37.4)
MCV RBC AUTO: 69 FL (ref 82–98)
PLATELET # BLD AUTO: 365 THOUSANDS/UL (ref 149–390)
PMV BLD AUTO: 10.2 FL (ref 8.9–12.7)
POTASSIUM SERPL-SCNC: 3.8 MMOL/L (ref 3.5–5.3)
RBC # BLD AUTO: 5.29 MILLION/UL (ref 3.88–5.62)
SODIUM SERPL-SCNC: 138 MMOL/L (ref 136–145)
WBC # BLD AUTO: 8.32 THOUSAND/UL (ref 4.31–10.16)

## 2017-07-22 PROCEDURE — 85027 COMPLETE CBC AUTOMATED: CPT | Performed by: PHYSICIAN ASSISTANT

## 2017-07-22 PROCEDURE — 85018 HEMOGLOBIN: CPT | Performed by: PHYSICIAN ASSISTANT

## 2017-07-22 PROCEDURE — 94762 N-INVAS EAR/PLS OXIMTRY CONT: CPT

## 2017-07-22 PROCEDURE — 94760 N-INVAS EAR/PLS OXIMETRY 1: CPT

## 2017-07-22 PROCEDURE — C9113 INJ PANTOPRAZOLE SODIUM, VIA: HCPCS | Performed by: PHYSICIAN ASSISTANT

## 2017-07-22 PROCEDURE — 85014 HEMATOCRIT: CPT | Performed by: PHYSICIAN ASSISTANT

## 2017-07-22 PROCEDURE — 80048 BASIC METABOLIC PNL TOTAL CA: CPT | Performed by: PHYSICIAN ASSISTANT

## 2017-07-22 RX ORDER — OXYCODONE HYDROCHLORIDE 10 MG/1
10 TABLET ORAL EVERY 4 HOURS PRN
Status: DISCONTINUED | OUTPATIENT
Start: 2017-07-22 | End: 2017-07-22

## 2017-07-22 RX ORDER — OXYCODONE HYDROCHLORIDE 10 MG/1
10 TABLET ORAL
Status: DISCONTINUED | OUTPATIENT
Start: 2017-07-22 | End: 2017-07-22 | Stop reason: HOSPADM

## 2017-07-22 RX ORDER — PROMETHAZINE HYDROCHLORIDE 25 MG/1
25 TABLET ORAL EVERY 6 HOURS PRN
Qty: 15 TABLET | Refills: 0 | Status: SHIPPED | OUTPATIENT
Start: 2017-07-22 | End: 2017-10-26

## 2017-07-22 RX ADMIN — HYDROMORPHONE HYDROCHLORIDE 1.5 MG: 2 INJECTION, SOLUTION INTRAMUSCULAR; INTRAVENOUS; SUBCUTANEOUS at 11:28

## 2017-07-22 RX ADMIN — ALUMINUM HYDROXIDE, MAGNESIUM HYDROXIDE, AND SIMETHICONE 30 ML: 200; 200; 20 SUSPENSION ORAL at 16:13

## 2017-07-22 RX ADMIN — OXYCODONE HYDROCHLORIDE 10 MG: 10 TABLET ORAL at 16:33

## 2017-07-22 RX ADMIN — PROMETHAZINE HYDROCHLORIDE 25 MG: 25 INJECTION INTRAMUSCULAR; INTRAVENOUS at 01:31

## 2017-07-22 RX ADMIN — HYDROMORPHONE HYDROCHLORIDE 1.5 MG: 2 INJECTION, SOLUTION INTRAMUSCULAR; INTRAVENOUS; SUBCUTANEOUS at 08:29

## 2017-07-22 RX ADMIN — SODIUM CHLORIDE 125 ML/HR: 0.9 INJECTION, SOLUTION INTRAVENOUS at 14:59

## 2017-07-22 RX ADMIN — PROMETHAZINE HYDROCHLORIDE 25 MG: 25 INJECTION INTRAMUSCULAR; INTRAVENOUS at 06:45

## 2017-07-22 RX ADMIN — OXYCODONE HYDROCHLORIDE 10 MG: 10 TABLET ORAL at 13:37

## 2017-07-22 RX ADMIN — POLYETHYLENE GLYCOL 3350 17 G: 17 POWDER, FOR SOLUTION ORAL at 08:29

## 2017-07-22 RX ADMIN — HYDROMORPHONE HYDROCHLORIDE 1.5 MG: 2 INJECTION, SOLUTION INTRAMUSCULAR; INTRAVENOUS; SUBCUTANEOUS at 05:30

## 2017-07-22 RX ADMIN — SODIUM CHLORIDE 125 ML/HR: 0.9 INJECTION, SOLUTION INTRAVENOUS at 07:12

## 2017-07-22 RX ADMIN — PANTOPRAZOLE SODIUM 40 MG: 40 INJECTION, POWDER, FOR SOLUTION INTRAVENOUS at 08:29

## 2017-07-22 RX ADMIN — HYDROMORPHONE HYDROCHLORIDE 1.5 MG: 2 INJECTION, SOLUTION INTRAMUSCULAR; INTRAVENOUS; SUBCUTANEOUS at 02:41

## 2017-07-22 RX ADMIN — PROMETHAZINE HYDROCHLORIDE 25 MG: 25 INJECTION INTRAMUSCULAR; INTRAVENOUS at 12:46

## 2017-07-25 ENCOUNTER — GENERIC CONVERSION - ENCOUNTER (OUTPATIENT)
Dept: OTHER | Facility: OTHER | Age: 37
End: 2017-07-25

## 2017-07-26 ENCOUNTER — TRANSCRIBE ORDERS (OUTPATIENT)
Dept: ADMINISTRATIVE | Facility: HOSPITAL | Age: 37
End: 2017-07-26

## 2017-07-26 ENCOUNTER — ALLSCRIPTS OFFICE VISIT (OUTPATIENT)
Dept: OTHER | Facility: OTHER | Age: 37
End: 2017-07-26

## 2017-07-26 DIAGNOSIS — C34.90 PRIMARY LUNG CANCER WITH METASTASIS FROM LUNG TO OTHER SITE, UNSPECIFIED LATERALITY (HCC): Primary | ICD-10-CM

## 2017-07-27 LAB — CYANIDE BLD-MCNC: <0.05 MG/L

## 2017-07-31 DIAGNOSIS — C34.91 MALIGNANT NEOPLASM OF UNSPECIFIED PART OF RIGHT BRONCHUS OR LUNG (HCC): ICD-10-CM

## 2017-08-21 ENCOUNTER — ALLSCRIPTS OFFICE VISIT (OUTPATIENT)
Dept: OTHER | Facility: OTHER | Age: 37
End: 2017-08-21

## 2017-08-30 ENCOUNTER — GENERIC CONVERSION - ENCOUNTER (OUTPATIENT)
Dept: OTHER | Facility: OTHER | Age: 37
End: 2017-08-30

## 2017-09-13 ENCOUNTER — ALLSCRIPTS OFFICE VISIT (OUTPATIENT)
Dept: OTHER | Facility: OTHER | Age: 37
End: 2017-09-13

## 2017-10-01 ENCOUNTER — APPOINTMENT (EMERGENCY)
Dept: CT IMAGING | Facility: HOSPITAL | Age: 37
End: 2017-10-01
Payer: COMMERCIAL

## 2017-10-01 ENCOUNTER — HOSPITAL ENCOUNTER (EMERGENCY)
Facility: HOSPITAL | Age: 37
Discharge: HOME | End: 2017-10-01
Attending: EMERGENCY MEDICINE | Admitting: EMERGENCY MEDICINE
Payer: COMMERCIAL

## 2017-10-01 ENCOUNTER — APPOINTMENT (EMERGENCY)
Dept: RADIOLOGY | Facility: HOSPITAL | Age: 37
End: 2017-10-01
Payer: COMMERCIAL

## 2017-10-01 VITALS
WEIGHT: 189 LBS | SYSTOLIC BLOOD PRESSURE: 132 MMHG | OXYGEN SATURATION: 95 % | BODY MASS INDEX: 23.5 KG/M2 | HEART RATE: 84 BPM | HEIGHT: 75 IN | TEMPERATURE: 100 F | DIASTOLIC BLOOD PRESSURE: 66 MMHG | RESPIRATION RATE: 19 BRPM

## 2017-10-01 DIAGNOSIS — R11.2 NAUSEA & VOMITING: Primary | ICD-10-CM

## 2017-10-01 DIAGNOSIS — T50.905A MEDICATION REACTION, INITIAL ENCOUNTER: ICD-10-CM

## 2017-10-01 LAB
ALBUMIN SERPL BCP-MCNC: 3.2 G/DL (ref 3.5–5)
ALP SERPL-CCNC: 145 U/L (ref 46–116)
ALT SERPL W P-5'-P-CCNC: 20 U/L (ref 12–78)
ANION GAP SERPL CALCULATED.3IONS-SCNC: 10 MMOL/L (ref 4–13)
APTT PPP: 31 SECONDS (ref 23–35)
AST SERPL W P-5'-P-CCNC: 40 U/L (ref 5–45)
BACTERIA UR QL AUTO: ABNORMAL /HPF
BASOPHILS # BLD AUTO: 0.06 THOUSANDS/ΜL (ref 0–0.1)
BASOPHILS NFR BLD AUTO: 1 % (ref 0–1)
BILIRUB SERPL-MCNC: 0.4 MG/DL (ref 0.2–1)
BILIRUB UR QL STRIP: NEGATIVE
BUN SERPL-MCNC: 13 MG/DL (ref 5–25)
CALCIUM SERPL-MCNC: 9.8 MG/DL (ref 8.3–10.1)
CHLORIDE SERPL-SCNC: 99 MMOL/L (ref 100–108)
CLARITY UR: CLEAR
CO2 SERPL-SCNC: 28 MMOL/L (ref 21–32)
COLOR UR: YELLOW
CREAT SERPL-MCNC: 0.76 MG/DL (ref 0.6–1.3)
EOSINOPHIL # BLD AUTO: 0.17 THOUSAND/ΜL (ref 0–0.61)
EOSINOPHIL NFR BLD AUTO: 2 % (ref 0–6)
ERYTHROCYTE [DISTWIDTH] IN BLOOD BY AUTOMATED COUNT: 17.3 % (ref 11.6–15.1)
GFR SERPL CREATININE-BSD FRML MDRD: 133 ML/MIN/1.73SQ M
GLUCOSE SERPL-MCNC: 98 MG/DL (ref 65–140)
GLUCOSE UR STRIP-MCNC: NEGATIVE MG/DL
HCT VFR BLD AUTO: 32.2 % (ref 36.5–49.3)
HGB BLD-MCNC: 10.1 G/DL (ref 12–17)
HGB UR QL STRIP.AUTO: NEGATIVE
INR PPP: 1.01 (ref 0.86–1.16)
KETONES UR STRIP-MCNC: NEGATIVE MG/DL
LACTATE SERPL-SCNC: 0.8 MMOL/L (ref 0.5–2)
LEUKOCYTE ESTERASE UR QL STRIP: NEGATIVE
LIPASE SERPL-CCNC: 79 U/L (ref 73–393)
LYMPHOCYTES # BLD AUTO: 1.55 THOUSANDS/ΜL (ref 0.6–4.47)
LYMPHOCYTES NFR BLD AUTO: 16 % (ref 14–44)
MCH RBC QN AUTO: 20.6 PG (ref 26.8–34.3)
MCHC RBC AUTO-ENTMCNC: 31.4 G/DL (ref 31.4–37.4)
MCV RBC AUTO: 66 FL (ref 82–98)
MONOCYTES # BLD AUTO: 0.94 THOUSAND/ΜL (ref 0.17–1.22)
MONOCYTES NFR BLD AUTO: 10 % (ref 4–12)
MUCOUS THREADS UR QL AUTO: ABNORMAL
NEUTROPHILS # BLD AUTO: 6.87 THOUSANDS/ΜL (ref 1.85–7.62)
NEUTS SEG NFR BLD AUTO: 71 % (ref 43–75)
NITRITE UR QL STRIP: NEGATIVE
NON-SQ EPI CELLS URNS QL MICRO: ABNORMAL /HPF
NRBC BLD AUTO-RTO: 0 /100 WBCS
PH UR STRIP.AUTO: 6 [PH] (ref 4.5–8)
PLATELET # BLD AUTO: 502 THOUSANDS/UL (ref 149–390)
PMV BLD AUTO: 8.8 FL (ref 8.9–12.7)
POTASSIUM SERPL-SCNC: 4 MMOL/L (ref 3.5–5.3)
PROT SERPL-MCNC: 8.1 G/DL (ref 6.4–8.2)
PROT UR STRIP-MCNC: ABNORMAL MG/DL
PROTHROMBIN TIME: 13.5 SECONDS (ref 12.1–14.4)
RBC # BLD AUTO: 4.9 MILLION/UL (ref 3.88–5.62)
RBC #/AREA URNS AUTO: ABNORMAL /HPF
SODIUM SERPL-SCNC: 137 MMOL/L (ref 136–145)
SP GR UR STRIP.AUTO: 1.02 (ref 1–1.03)
UROBILINOGEN UR QL STRIP.AUTO: 0.2 E.U./DL
WBC # BLD AUTO: 9.63 THOUSAND/UL (ref 4.31–10.16)
WBC #/AREA URNS AUTO: ABNORMAL /HPF

## 2017-10-01 PROCEDURE — 83690 ASSAY OF LIPASE: CPT | Performed by: EMERGENCY MEDICINE

## 2017-10-01 PROCEDURE — 71020 HB CHEST X-RAY 2VW FRONTAL&LATL: CPT

## 2017-10-01 PROCEDURE — 93005 ELECTROCARDIOGRAM TRACING: CPT | Performed by: EMERGENCY MEDICINE

## 2017-10-01 PROCEDURE — 36415 COLL VENOUS BLD VENIPUNCTURE: CPT | Performed by: EMERGENCY MEDICINE

## 2017-10-01 PROCEDURE — 85610 PROTHROMBIN TIME: CPT | Performed by: EMERGENCY MEDICINE

## 2017-10-01 PROCEDURE — 99284 EMERGENCY DEPT VISIT MOD MDM: CPT

## 2017-10-01 PROCEDURE — 96375 TX/PRO/DX INJ NEW DRUG ADDON: CPT

## 2017-10-01 PROCEDURE — 85025 COMPLETE CBC W/AUTO DIFF WBC: CPT | Performed by: EMERGENCY MEDICINE

## 2017-10-01 PROCEDURE — 85730 THROMBOPLASTIN TIME PARTIAL: CPT | Performed by: EMERGENCY MEDICINE

## 2017-10-01 PROCEDURE — 96374 THER/PROPH/DIAG INJ IV PUSH: CPT

## 2017-10-01 PROCEDURE — 83605 ASSAY OF LACTIC ACID: CPT | Performed by: EMERGENCY MEDICINE

## 2017-10-01 PROCEDURE — 87040 BLOOD CULTURE FOR BACTERIA: CPT | Performed by: EMERGENCY MEDICINE

## 2017-10-01 PROCEDURE — 80053 COMPREHEN METABOLIC PANEL: CPT | Performed by: EMERGENCY MEDICINE

## 2017-10-01 PROCEDURE — 81001 URINALYSIS AUTO W/SCOPE: CPT | Performed by: EMERGENCY MEDICINE

## 2017-10-01 PROCEDURE — 96361 HYDRATE IV INFUSION ADD-ON: CPT

## 2017-10-01 RX ORDER — ACETAMINOPHEN 325 MG/1
650 TABLET ORAL ONCE
Status: COMPLETED | OUTPATIENT
Start: 2017-10-01 | End: 2017-10-01

## 2017-10-01 RX ORDER — METOCLOPRAMIDE HYDROCHLORIDE 5 MG/ML
10 INJECTION INTRAMUSCULAR; INTRAVENOUS ONCE
Status: COMPLETED | OUTPATIENT
Start: 2017-10-01 | End: 2017-10-01

## 2017-10-01 RX ORDER — LORAZEPAM 2 MG/ML
1 INJECTION INTRAMUSCULAR ONCE
Status: COMPLETED | OUTPATIENT
Start: 2017-10-01 | End: 2017-10-01

## 2017-10-01 RX ORDER — DIPHENHYDRAMINE HYDROCHLORIDE 50 MG/ML
12.5 INJECTION INTRAMUSCULAR; INTRAVENOUS ONCE
Status: COMPLETED | OUTPATIENT
Start: 2017-10-01 | End: 2017-10-01

## 2017-10-01 RX ORDER — PROMETHAZINE HYDROCHLORIDE 25 MG/ML
25 INJECTION, SOLUTION INTRAMUSCULAR; INTRAVENOUS ONCE
Status: COMPLETED | OUTPATIENT
Start: 2017-10-01 | End: 2017-10-01

## 2017-10-01 RX ORDER — OXYCODONE HYDROCHLORIDE 10 MG/1
10 TABLET ORAL ONCE
Status: COMPLETED | OUTPATIENT
Start: 2017-10-01 | End: 2017-10-01

## 2017-10-01 RX ORDER — PROMETHAZINE HYDROCHLORIDE 25 MG/1
25 TABLET ORAL EVERY 6 HOURS PRN
Qty: 20 TABLET | Refills: 0 | Status: SHIPPED | OUTPATIENT
Start: 2017-10-01 | End: 2017-10-26

## 2017-10-01 RX ADMIN — SODIUM CHLORIDE 1000 ML: 0.9 INJECTION, SOLUTION INTRAVENOUS at 17:09

## 2017-10-01 RX ADMIN — PROMETHAZINE HYDROCHLORIDE 25 MG: 25 INJECTION INTRAMUSCULAR; INTRAVENOUS at 16:34

## 2017-10-01 RX ADMIN — ACETAMINOPHEN 650 MG: 325 TABLET ORAL at 19:09

## 2017-10-01 RX ADMIN — OXYCODONE HYDROCHLORIDE 10 MG: 10 TABLET ORAL at 19:08

## 2017-10-01 RX ADMIN — SODIUM CHLORIDE 1000 ML: 0.9 INJECTION, SOLUTION INTRAVENOUS at 18:32

## 2017-10-01 RX ADMIN — METOCLOPRAMIDE 10 MG: 5 INJECTION, SOLUTION INTRAMUSCULAR; INTRAVENOUS at 18:09

## 2017-10-01 RX ADMIN — HEPARIN 300 UNITS: 100 SYRINGE at 21:08

## 2017-10-01 RX ADMIN — SODIUM CHLORIDE 1000 ML: 0.9 INJECTION, SOLUTION INTRAVENOUS at 18:26

## 2017-10-01 RX ADMIN — DIPHENHYDRAMINE HYDROCHLORIDE 12.5 MG: 50 INJECTION, SOLUTION INTRAMUSCULAR; INTRAVENOUS at 18:20

## 2017-10-01 RX ADMIN — LORAZEPAM 1 MG: 2 INJECTION INTRAMUSCULAR; INTRAVENOUS at 18:38

## 2017-10-01 RX ADMIN — HYDROMORPHONE HYDROCHLORIDE 0.5 MG: 1 INJECTION, SOLUTION INTRAMUSCULAR; INTRAVENOUS; SUBCUTANEOUS at 17:17

## 2017-10-01 RX ADMIN — DIPHENHYDRAMINE HYDROCHLORIDE 12.5 MG: 50 INJECTION, SOLUTION INTRAMUSCULAR; INTRAVENOUS at 18:04

## 2017-10-01 NOTE — ED PROVIDER NOTES
History  Chief Complaint   Patient presents with    Nausea     Pt has stage 4 lung cancer, was going through chemo, was stopped 2 years ago due to not doing anything  Pt has been treating it from home  Pt had an increase in his pain medication, fentanyl 37mcg then increased to 50mcg  Pt has been experiencing nausea and vomting since  Pt has swelling to the right shoulder, which is where his mass is  Pt is very tender in that area  No new SOB  Pt has also had difficulty urinating, denies any pain or frequency with urination  75-year-old male presents with eval for nausea and pain control, he has a hx of cancer, 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  He states that they recently increased his fentanyl patch, he has not been reacting well to this and he has nausea and vomiting  He has not been able to keep down his Oxycodone pain control since this morning secondary to the vomiting and now he has more back pain  He states that he has abdominal pain which is not usual for him, also has difficulty with urination, and his back pain which is usual for him with his cancer  Admits to nausea and vomiting, no diarrhea, no constipation, no abdominal trauma  He has not started any new medications  He states he has had some chills with fever however no known sick contacts  Admits to some shortness of breath and he is unsure if that is secondary to the pain or otherwise  No history of PEs  He does not have active chemo at this time  He was diagnosed 3 years ago, attempted chemo but that was stopped after year because it was not working, and he is now being reassessed by the oncologist for further care  Prior to Admission Medications   Prescriptions Last Dose Informant Patient Reported? Taking? Multiple Vitamin (MULTIVITAMIN) tablet   Yes No   Sig: Take 1 tablet by mouth daily     fentaNYL (DURAGESIC) 50 mcg/hr   Yes No   Sig: Place 1 patch on the skin every third day   folic acid (FOLVITE) 1 mg tablet   Yes No   Sig: Take 1 mg by mouth daily  gabapentin (NEURONTIN) 100 mg capsule   Yes No   Sig: Take 100 mg by mouth 3 (three) times a day   omeprazole (PriLOSEC) 10 mg delayed release capsule   Yes No   Sig: Take 10 mg by mouth daily   oxyCODONE (OXY-IR) 5 MG capsule   Yes No   Sig: Take 10 mg by mouth every 4 (four) hours as needed for moderate pain     promethazine (PHENERGAN) 25 mg tablet   No No   Sig: Take 1 tablet by mouth every 6 (six) hours as needed for nausea or vomiting      Facility-Administered Medications: None       Past Medical History:   Diagnosis Date    Anxiety     GERD (gastroesophageal reflux disease)     Lung cancer        Past Surgical History:   Procedure Laterality Date    PORTACATH PLACEMENT         History reviewed  No pertinent family history  I have reviewed and agree with the history as documented  Social History   Substance Use Topics    Smoking status: Former Smoker     Quit date: 12/1/2015    Smokeless tobacco: Not on file    Alcohol use No        Review of Systems   Constitutional: Positive for chills, fatigue and fever  HENT: Negative for congestion, sinus pressure and sore throat  Respiratory: Positive for shortness of breath  Negative for cough and chest tightness  Cardiovascular: Negative for chest pain and palpitations  Gastrointestinal: Positive for abdominal pain, nausea and vomiting  Negative for constipation and diarrhea  Genitourinary: Positive for difficulty urinating  Negative for dysuria and flank pain  Musculoskeletal: Positive for back pain  Negative for neck pain and neck stiffness  Skin: Negative for color change and rash  Allergic/Immunologic: Negative for immunocompromised state  Neurological: Positive for weakness  Negative for dizziness, syncope and headaches  Psychiatric/Behavioral: Negative for confusion         Physical Exam  ED Triage Vitals [10/01/17 1604]   Temperature Pulse Respirations Blood Pressure SpO2   100 °F (37 8 °C) 84 19 132/66 95 %      Temp Source Heart Rate Source Patient Position - Orthostatic VS BP Location FiO2 (%)   Oral Monitor Lying Right arm --      Pain Score       Worst Possible Pain           Physical Exam   Constitutional: He is oriented to person, place, and time  He appears well-developed and well-nourished  No distress (appears uncomfortable)  HENT:   Head: Normocephalic and atraumatic  Right Ear: External ear normal    Left Ear: External ear normal    Oropharynx clear but dry  No erythema  Eyes: Conjunctivae and EOM are normal  Pupils are equal, round, and reactive to light  Right eye exhibits no discharge  Left eye exhibits no discharge  No scleral icterus  Neck: Normal range of motion  Neck supple  No JVD present  Cardiovascular: Normal rate, regular rhythm, normal heart sounds and intact distal pulses  Exam reveals no gallop and no friction rub  No murmur heard  Pulmonary/Chest: Effort normal and breath sounds normal  No respiratory distress  He has no wheezes  He has no rales  He exhibits no tenderness  Abdominal: Soft  Bowel sounds are normal  He exhibits no distension  There is tenderness (Diffuse but lower more than upper)  There is guarding  There is no rebound  Musculoskeletal: Normal range of motion  He exhibits no edema, tenderness or deformity  Neurological: He is alert and oriented to person, place, and time  No cranial nerve deficit  Skin: Skin is warm and dry  No rash noted  He is not diaphoretic  No erythema  No pallor  Psychiatric: He has a normal mood and affect  His behavior is normal    Vitals reviewed        ED Medications  Medications    EMS REPLENISHMENT MED (not administered)   sodium chloride 0 9 % bolus 1,000 mL (0 mL Intravenous Stopped 10/1/17 1826)     Followed by   sodium chloride 0 9 % bolus 1,000 mL (1,000 mL Intravenous New Bag 10/1/17 1826)     Followed by   sodium chloride 0 9 % bolus 1,000 mL (1,000 mL Intravenous New Bag 10/1/17 1832) promethazine (PHENERGAN) injection 25 mg (25 mg Intravenous Given 10/1/17 1634)   HYDROmorphone (DILAUDID) 1 mg/mL injection 0 5 mg (0 5 mg Intravenous Given 10/1/17 1717)   metoclopramide (REGLAN) injection 10 mg (10 mg Intravenous Given 10/1/17 1809)   diphenhydrAMINE (BENADRYL) injection 12 5 mg (12 5 mg Intravenous Given 10/1/17 1804)   diphenhydrAMINE (BENADRYL) injection 12 5 mg (12 5 mg Intravenous Given 10/1/17 1820)   LORazepam (ATIVAN) 2 mg/mL injection 1 mg (1 mg Intravenous Given 10/1/17 1838)   acetaminophen (TYLENOL) tablet 650 mg (650 mg Oral Given 10/1/17 1909)   oxyCODONE (ROXICODONE) immediate release tablet 10 mg (10 mg Oral Given 10/1/17 1908)       Diagnostic Studies  Labs Reviewed   CBC AND DIFFERENTIAL - Abnormal        Result Value Ref Range Status    Hemoglobin 10 1 (*) 12 0 - 17 0 g/dL Final    Hematocrit 32 2 (*) 36 5 - 49 3 % Final    MCV 66 (*) 82 - 98 fL Final    MCH 20 6 (*) 26 8 - 34 3 pg Final    RDW 17 3 (*) 11 6 - 15 1 % Final    MPV 8 8 (*) 8 9 - 12 7 fL Final    Platelets 858 (*) 605 - 390 Thousands/uL Final    WBC 9 63  4 31 - 10 16 Thousand/uL Final    RBC 4 90  3 88 - 5 62 Million/uL Final    MCHC 31 4  31 4 - 37 4 g/dL Final    nRBC 0  /100 WBCs Final    Neutrophils Relative 71  43 - 75 % Final    Lymphocytes Relative 16  14 - 44 % Final    Monocytes Relative 10  4 - 12 % Final    Eosinophils Relative 2  0 - 6 % Final    Basophils Relative 1  0 - 1 % Final    Neutrophils Absolute 6 87  1 85 - 7 62 Thousands/µL Final    Lymphocytes Absolute 1 55  0 60 - 4 47 Thousands/µL Final    Monocytes Absolute 0 94  0 17 - 1 22 Thousand/µL Final    Eosinophils Absolute 0 17  0 00 - 0 61 Thousand/µL Final    Basophils Absolute 0 06  0 00 - 0 10 Thousands/µL Final   COMPREHENSIVE METABOLIC PANEL - Abnormal     Chloride 99 (*) 100 - 108 mmol/L Final    Alkaline Phosphatase 145 (*) 46 - 116 U/L Final    Albumin 3 2 (*) 3 5 - 5 0 g/dL Final    Sodium 137  136 - 145 mmol/L Final    Potassium 4 0  3 5 - 5 3 mmol/L Final    CO2 28  21 - 32 mmol/L Final    Anion Gap 10  4 - 13 mmol/L Final    BUN 13  5 - 25 mg/dL Final    Creatinine 0 76  0 60 - 1 30 mg/dL Final    Comment: Standardized to IDMS reference method    Glucose 98  65 - 140 mg/dL Final    Comment:   If the patient is fasting, the ADA then defines impaired fasting glucose as > 100 mg/dL and diabetes as > or equal to 123 mg/dL  Specimen collection should occur prior to Sulfasalazine administration due to the potential for falsely depressed results  Specimen collection should occur prior to Sulfapyridine administration due to the potential for falsely elevated results  Calcium 9 8  8 3 - 10 1 mg/dL Final    AST 40  5 - 45 U/L Final    Comment:   Specimen collection should occur prior to Sulfasalazine administration due to the potential for falsely depressed results  ALT 20  12 - 78 U/L Final    Comment:   Specimen collection should occur prior to Sulfasalazine administration due to the potential for falsely depressed results  Total Protein 8 1  6 4 - 8 2 g/dL Final    Total Bilirubin 0 40  0 20 - 1 00 mg/dL Final    eGFR 133  ml/min/1 73sq m Final    Narrative:     National Kidney Disease Education Program recommendations are as follows:  GFR calculation is accurate only with a steady state creatinine  Chronic Kidney disease less than 60 ml/min/1 73 sq  meters  Kidney failure less than 15 ml/min/1 73 sq  meters     UA W REFLEX TO MICROSCOPIC WITH REFLEX TO CULTURE - Abnormal     Protein, UA Trace (*) Negative mg/dl Final    Color, UA Yellow   Final    Clarity, UA Clear   Final    Specific Gravity, UA 1 025  1 003 - 1 030 Final    pH, UA 6 0  4 5 - 8 0 Final    Leukocytes, UA Negative  Negative Final    Nitrite, UA Negative  Negative Final    Glucose, UA Negative  Negative mg/dl Final    Ketones, UA Negative  Negative mg/dl Final    Urobilinogen, UA 0 2  0 2, 1 0 E U /dl E U /dl Final    Bilirubin, UA Negative  Negative Final    Blood, UA Negative  Negative Final   URINE MICROSCOPIC - Abnormal     WBC, UA 0-1 (*) None Seen, 0-5, 5-55, 5-65 /hpf Final    RBC, UA None Seen  None Seen, 0-5 /hpf Final    Epithelial Cells None Seen  None Seen, Occasional /hpf Final    Bacteria, UA None Seen  None Seen, Occasional /hpf Final    MUCOUS THREADS Moderate  Occasional, Moderate, Innumerable Final   PROTIME-INR - Normal    Protime 13 5  12 1 - 14 4 seconds Final    INR 1 01  0 86 - 1 16 Final   APTT - Normal    PTT 31  23 - 35 seconds Final    Narrative: Therapeutic Heparin Range = 60-90 seconds   LACTIC ACID, PLASMA - Normal    LACTIC ACID 0 8  0 5 - 2 0 mmol/L Final    Narrative:     Result may be elevated if tourniquet was used during collection  LIPASE - Normal    Lipase 79  73 - 393 u/L Final   BLOOD CULTURE   BLOOD CULTURE       XR chest pa & lateral   ED Interpretation   No acute infiltrate  Port for chemotherapy is noted  PE Study with CT abdomen & pelvis with contrast    (Results Pending)       Procedures  ECG 12 Lead Documentation  Date/Time: 10/1/2017 6:52 PM  Performed by: Mine Poon by: Richie Rojas     Indications / Diagnosis:  Nausea  ECG reviewed by me, the ED Provider: yes    Patient location:  ED  Previous ECG:     Previous ECG:  Compared to current    Similarity:  No change    Comparison to cardiac monitor: Yes    Interpretation:     Interpretation: normal    Rate:     ECG rate:  Ninety-six    ECG rate assessment: normal    Rhythm:     Rhythm: sinus rhythm    Ectopy:     Ectopy: none    QRS:     QRS axis:  Normal    QRS intervals:  Normal  Conduction:     Conduction: normal    ST segments:     ST segments:  Normal  T waves:     T waves: normal            Phone Contacts  ED Phone Contact    ED Course  ED Course as of Oct 01 2030   Sun Oct 01, 2017   1745 Nausea returns - will attempt reglan  Pain is well controlled though  1749 Patient feeling improved and requesting to eat crackers    Able to ambulate around the ER and feels better    1816 Patient feeling jumpy after the reglan  Will give more benadryl    1835 States the benadryl may be making him feel this way - will attempt ativan    2009 Patient feels much improved and wants to go home  Will discuss risks and benefits of d/c with him    2023 Patient feels much improved  He does not want to stay in the hospital any longer  He states that he feels well enough to go home  He understands we did not perform a CT scan he understands the risks and benefits of discharge but he wants to be discharged  Given good return precautions  MDM  Number of Diagnoses or Management Options  Medication reaction, initial encounter:   Nausea & vomiting:   Diagnosis management comments: Abdominal pain and shortness of breath, mild fever with a history of cancer  Will do cardiac evaluation, abdominal evaluation, CT to evaluate for PE as well as the abdominal and pelvis scan to evaluate tumor  Urinalysis  IV fluid  Will give Phenergan as patient has bad reactions to Zofran  Pain control  Patient feels much improved and would like to be discharged home even though the workup has not been complete  Patient understands the risks and benefits of discharge at have a CT scan given his history  He would like to be discharged regardless and will return if he has any worsening symptoms  Will be given Phenergan for home  CritCare Time    Disposition  Final diagnoses:   Nausea & vomiting   Medication reaction, initial encounter     ED Disposition     ED Disposition Condition Comment    Discharge  Kelsi Ferreira discharge to home/self care      Condition at discharge: Good        Follow-up Information     Follow up With Specialties Details Why Contact Info Additional [address] [facility] Emergency Medicine  If symptoms worsen [address] [facility] [address] [address] [facility], [address] [place], [place], [address]    Please follow up with your pain control doctor and her oncologist for further treatment  Schedule an appointment as soon as possible for a visit           Patient's Medications   Discharge Prescriptions    PROMETHAZINE (PHENERGAN) 25 MG TABLET    Take 1 tablet by mouth every 6 (six) hours as needed for nausea or vomiting       Start Date: 10/1/2017 End Date: --       Order Dose: 25 mg       Quantity: 20 tablet    Refills: 0     No discharge procedures on file      ED Provider  Electronically Signed by       Tiana Desai DO  10/01/17 7844

## 2017-10-02 LAB
ATRIAL RATE: 96 BPM
P AXIS: 81 DEGREES
PR INTERVAL: 144 MS
QRS AXIS: 77 DEGREES
QRSD INTERVAL: 92 MS
QT INTERVAL: 346 MS
QTC INTERVAL: 437 MS
T WAVE AXIS: 57 DEGREES
VENTRICULAR RATE: 96 BPM

## 2017-10-02 NOTE — DISCHARGE INSTRUCTIONS
Acute Nausea and Vomiting, Ambulatory Care   GENERAL INFORMATION:   Acute nausea and vomiting  starts suddenly, gets worse quickly, and lasts a short time  Nausea and vomiting may be caused by pregnancy, alcohol, infection, or medicines  Common related symptoms include the following:   · Fever    · Abdominal swelling    · Pain, tenderness, or a lump in the abdomen    · Splashing sounds heard in your stomach when you move  Seek immediate care for the following symptoms:   · Blood in your vomit or bowel movements    · Sudden, severe pain in your chest and upper abdomen after hard vomiting    · Dizziness, dry mouth, and thirst    · Urinating very little or not at all    · Muscle weakness, leg cramps, and trouble breathing    · A heart beat that is faster than normal    · Vomiting for more than 48 hours  Treatment for acute nausea and vomiting  may include medicines to calm your stomach and stop the vomiting  You may need IV fluids if you are dehydrated  Manage your nausea and vomiting:   · Drink liquids as directed to prevent dehydration  Ask how much liquid to drink each day and which liquids are best for you  You may need to drink an oral rehydration solution (ORS)  ORS contains water, salts, and sugar that are needed to replace the lost body fluids  Ask what kind of ORS to use, how much to drink, and where to get it  · Eat smaller meals, more often  Eat small amounts of food every 2 to 3 hours, even if you are not hungry  Food in your stomach may help decrease your nausea  · Avoid stress  Find ways to relax and manage your stress  Headaches due to stress may cause nausea and vomiting  Get more rest and sleep  Follow up with your healthcare provider as directed:  Write down your questions so you remember to ask them during your visits  CARE AGREEMENT:   You have the right to help plan your care  Learn about your health condition and how it may be treated   Discuss treatment options with your caregivers to decide what care you want to receive  You always have the right to refuse treatment  The above information is an  only  It is not intended as medical advice for individual conditions or treatments  Talk to your doctor, nurse or pharmacist before following any medical regimen to see if it is safe and effective for you  © 2014 [address] is for End User's use only and may not be sold, redistributed or otherwise used for commercial purposes  All illustrations and images included in CareNotes® are the copyrighted property of [employer]  or [address]  Acute Abdominal Pain   WHAT YOU NEED TO KNOW:   What do I need to know about acute abdominal pain? Acute abdominal pain usually starts suddenly and gets worse quickly  What are minor causes of acute abdominal pain? · An allergic reaction to food, or food poisoning    · Stress    · Acid reflux    · Constipation    · Monthly period pain in females  What are serious causes of acute abdominal pain? · Inflammation or rupture of your appendix    · Swelling or an infection in your abdomen or organ    · A blockage in your bowels    · An ulcer or a tear in your esophagus, stomach, or intestines    · Bleeding in your abdomen or an organ    · Stones in your kidney or gallbladder    · Diseases of the fallopian tubes or ovaries    · An ectopic pregnancy  How is the cause of acute abdominal pain diagnosed? Your healthcare provider will ask about your signs and symptoms  Tell the provider when your symptoms started and about any recent travel or surgery  Also tell him what makes the pain better or worse, and what treatments you have tried  The provider will examine you  Based on what your provider finds from the exam, and your symptoms, you may need other tests  Examples include blood or urine tests, an ultrasound, a CT scan, or an endoscopy  How is acute abdominal pain treated?   Treatment may depend on the cause of your abdominal pain  You may need any of the following:  · Medicines  may be given to decrease pain, treat an infection, and manage your symptoms, such as constipation  · Surgery  may be needed to treat a serious cause of abdominal pain  Examples include surgery to treat appendicitis or a blockage in your bowels  How can I manage my symptoms? · Apply heat  on your abdomen for 20 to 30 minutes every 2 hours for as many days as directed  Heat helps decrease pain and muscle spasms  · Make changes to the food you eat as directed  Do not eat foods that cause abdominal pain or other symptoms  Eat small meals more often  ¨ Eat more high-fiber foods if you are constipated  High-fiber foods include fruits, vegetables, whole-grain foods, and legumes  ¨ Do not eat foods that cause gas if you have bloating  Examples include broccoli, cabbage, and cauliflower  Do not drink soda or carbonated drinks, because these may also cause gas  ¨ Do not eat foods or drinks that contain sorbitol or fructose if you have diarrhea and bloating  Some examples are fruit juices, candy, jelly, and sugar-free gum  ¨ Do not eat high-fat foods, such as fried foods, cheeseburgers, hot dogs, and desserts  ¨ Limit or do not drink caffeine  Caffeine may make symptoms, such as heart burn or nausea, worse  ¨ Drink plenty of liquids to prevent dehydration from diarrhea or vomiting  Ask your healthcare provider how much liquid to drink each day and which liquids are best for you  · Manage your stress  Stress may cause abdominal pain  Your healthcare provider may recommend relaxation techniques and deep breathing exercises to help decrease your stress  Your healthcare provider may recommend you talk to someone about your stress or anxiety, such as a counselor or a trusted friend  Get plenty of sleep and exercise regularly  · Limit or do not drink alcohol  Alcohol can make your abdominal pain worse   Ask your healthcare provider if it is safe for you to drink alcohol  Also ask how much is safe for you to drink  · Do not smoke  Nicotine and other chemicals in cigarettes can damage your esophagus and stomach  Ask your healthcare provider for information if you currently smoke and need help to quit  E-cigarettes or smokeless tobacco still contain nicotine  Talk to your healthcare provider before you use these products  When should I seek immediate care? · You vomit blood or cannot stop vomiting  · You have blood in your bowel movement or it looks like tar  · You have bleeding from your rectum  · Your abdomen is larger than usual, more painful, and hard  · You have severe pain in your abdomen  · You stop passing gas and having bowel movements  · You feel weak, dizzy, or faint  When should I contact my healthcare provider? · You have a fever  · You have new signs and symptoms  · Your symptoms do not get better with treatment  · You have questions or concerns about your condition or care  CARE AGREEMENT:   You have the right to help plan your care  Learn about your health condition and how it may be treated  Discuss treatment options with your caregivers to decide what care you want to receive  You always have the right to refuse treatment  The above information is an  only  It is not intended as medical advice for individual conditions or treatments  Talk to your doctor, nurse or pharmacist before following any medical regimen to see if it is safe and effective for you  © 2017 [address] Information is for End User's use only and may not be sold, redistributed or otherwise used for commercial purposes  All illustrations and images included in CareNotes® are the copyrighted property of [employer]  or [employer]

## 2017-10-06 LAB — BACTERIA BLD CULT: NORMAL

## 2017-10-09 ENCOUNTER — GENERIC CONVERSION - ENCOUNTER (OUTPATIENT)
Dept: OTHER | Facility: OTHER | Age: 37
End: 2017-10-09

## 2017-10-11 ENCOUNTER — GENERIC CONVERSION - ENCOUNTER (OUTPATIENT)
Dept: OTHER | Facility: OTHER | Age: 37
End: 2017-10-11

## 2017-10-25 ENCOUNTER — ALLSCRIPTS OFFICE VISIT (OUTPATIENT)
Dept: OTHER | Facility: OTHER | Age: 37
End: 2017-10-25

## 2017-10-26 ENCOUNTER — APPOINTMENT (EMERGENCY)
Dept: RADIOLOGY | Facility: HOSPITAL | Age: 37
DRG: 136 | End: 2017-10-26
Payer: COMMERCIAL

## 2017-10-26 ENCOUNTER — HOSPITAL ENCOUNTER (INPATIENT)
Facility: HOSPITAL | Age: 37
LOS: 11 days | Discharge: HOME | DRG: 136 | End: 2017-11-06
Attending: EMERGENCY MEDICINE | Admitting: INTERNAL MEDICINE
Payer: COMMERCIAL

## 2017-10-26 DIAGNOSIS — R00.0 TACHYCARDIA: ICD-10-CM

## 2017-10-26 DIAGNOSIS — G89.3 PAIN OF METASTATIC MALIGNANCY: ICD-10-CM

## 2017-10-26 DIAGNOSIS — Z51.5 UNDER CARE OF PALLIATIVE CARE PHYSICIAN: ICD-10-CM

## 2017-10-26 DIAGNOSIS — R06.82 TACHYPNEA: ICD-10-CM

## 2017-10-26 DIAGNOSIS — C34.91 ADENOCARCINOMA OF RIGHT LUNG, STAGE 4 (HCC): Chronic | ICD-10-CM

## 2017-10-26 DIAGNOSIS — C34.91 ADENOCARCINOMA OF RIGHT LUNG (HCC): Primary | ICD-10-CM

## 2017-10-26 DIAGNOSIS — F11.20 CONTINUOUS OPIOID DEPENDENCE (HCC): ICD-10-CM

## 2017-10-26 DIAGNOSIS — C79.70: ICD-10-CM

## 2017-10-26 DIAGNOSIS — C34.90 PRIMARY LUNG CANCER, UNSPECIFIED LATERALITY (HCC): ICD-10-CM

## 2017-10-26 PROBLEM — D62 ACUTE BLOOD LOSS ANEMIA: Status: RESOLVED | Noted: 2017-07-21 | Resolved: 2017-10-26

## 2017-10-26 PROBLEM — R07.9 CHEST PAIN: Status: ACTIVE | Noted: 2017-10-26

## 2017-10-26 PROBLEM — K62.5 RECTAL BLEEDING: Status: RESOLVED | Noted: 2017-07-21 | Resolved: 2017-10-26

## 2017-10-26 LAB
ALBUMIN SERPL BCP-MCNC: 3.5 G/DL (ref 3.5–5)
ALP SERPL-CCNC: 196 U/L (ref 46–116)
ALT SERPL W P-5'-P-CCNC: 25 U/L (ref 12–78)
ANION GAP SERPL CALCULATED.3IONS-SCNC: 9 MMOL/L (ref 4–13)
ANISOCYTOSIS BLD QL SMEAR: PRESENT
APTT PPP: 29 SECONDS (ref 23–35)
AST SERPL W P-5'-P-CCNC: 60 U/L (ref 5–45)
ATRIAL RATE: 110 BPM
BACTERIA UR QL AUTO: ABNORMAL /HPF
BASOPHILS # BLD MANUAL: 0 THOUSAND/UL (ref 0–0.1)
BASOPHILS NFR MAR MANUAL: 0 % (ref 0–1)
BILIRUB SERPL-MCNC: 0.38 MG/DL (ref 0.2–1)
BILIRUB UR QL STRIP: NEGATIVE
BUN SERPL-MCNC: 10 MG/DL (ref 5–25)
BURR CELLS BLD QL SMEAR: PRESENT
CALCIUM SERPL-MCNC: 9.9 MG/DL (ref 8.3–10.1)
CHLORIDE SERPL-SCNC: 99 MMOL/L (ref 100–108)
CLARITY UR: CLEAR
CO2 SERPL-SCNC: 25 MMOL/L (ref 21–32)
COLOR UR: YELLOW
COLOR, POC: YELLOW
CREAT SERPL-MCNC: 0.91 MG/DL (ref 0.6–1.3)
EOSINOPHIL # BLD MANUAL: 0.3 THOUSAND/UL (ref 0–0.4)
EOSINOPHIL NFR BLD MANUAL: 3 % (ref 0–6)
ERYTHROCYTE [DISTWIDTH] IN BLOOD BY AUTOMATED COUNT: 17.1 % (ref 11.6–15.1)
GFR SERPL CREATININE-BSD FRML MDRD: 122 ML/MIN/1.73SQ M
GLUCOSE SERPL-MCNC: 104 MG/DL (ref 65–140)
GLUCOSE UR STRIP-MCNC: NEGATIVE MG/DL
HCT VFR BLD AUTO: 33.3 % (ref 36.5–49.3)
HGB BLD-MCNC: 10.9 G/DL (ref 12–17)
HGB UR QL STRIP.AUTO: ABNORMAL
HYALINE CASTS #/AREA URNS LPF: ABNORMAL /LPF
INR PPP: 1.17 (ref 0.86–1.16)
KETONES UR STRIP-MCNC: NEGATIVE MG/DL
LEUKOCYTE ESTERASE UR QL STRIP: NEGATIVE
LYMPHOCYTES # BLD AUTO: 1.87 THOUSAND/UL (ref 0.6–4.47)
LYMPHOCYTES # BLD AUTO: 19 % (ref 14–44)
MCH RBC QN AUTO: 20.3 PG (ref 26.8–34.3)
MCHC RBC AUTO-ENTMCNC: 32.7 G/DL (ref 31.4–37.4)
MCV RBC AUTO: 62 FL (ref 82–98)
MICROCYTES BLD QL AUTO: PRESENT
MONOCYTES # BLD AUTO: 0.49 THOUSAND/UL (ref 0–1.22)
MONOCYTES NFR BLD: 5 % (ref 4–12)
NEUTROPHILS # BLD MANUAL: 6.61 THOUSAND/UL (ref 1.85–7.62)
NEUTS BAND NFR BLD MANUAL: 1 % (ref 0–8)
NEUTS SEG NFR BLD AUTO: 66 % (ref 43–75)
NITRITE UR QL STRIP: NEGATIVE
NON-SQ EPI CELLS URNS QL MICRO: ABNORMAL /HPF
NRBC BLD AUTO-RTO: 0 /100 WBCS
P AXIS: 72 DEGREES
PH UR STRIP.AUTO: 7 [PH] (ref 4.5–8)
PLATELET # BLD AUTO: 603 THOUSANDS/UL (ref 149–390)
PLATELET BLD QL SMEAR: ABNORMAL
PMV BLD AUTO: 8.7 FL (ref 8.9–12.7)
POIKILOCYTOSIS BLD QL SMEAR: PRESENT
POLYCHROMASIA BLD QL SMEAR: PRESENT
POTASSIUM SERPL-SCNC: 4.2 MMOL/L (ref 3.5–5.3)
PR INTERVAL: 136 MS
PROT SERPL-MCNC: 9.1 G/DL (ref 6.4–8.2)
PROT UR STRIP-MCNC: ABNORMAL MG/DL
PROTHROMBIN TIME: 15 SECONDS (ref 12.1–14.4)
QRS AXIS: 109 DEGREES
QRSD INTERVAL: 86 MS
QT INTERVAL: 316 MS
QTC INTERVAL: 427 MS
RBC # BLD AUTO: 5.37 MILLION/UL (ref 3.88–5.62)
RBC #/AREA URNS AUTO: ABNORMAL /HPF
RBC MORPH BLD: PRESENT
SODIUM SERPL-SCNC: 133 MMOL/L (ref 136–145)
SP GR UR STRIP.AUTO: 1.01 (ref 1–1.03)
SPECIMEN SOURCE: NORMAL
T WAVE AXIS: 50 DEGREES
TARGETS BLD QL SMEAR: PRESENT
TROPONIN I BLD-MCNC: 0 NG/ML (ref 0–0.08)
UROBILINOGEN UR QL STRIP.AUTO: 0.2 E.U./DL
VARIANT LYMPHS # BLD AUTO: 6 %
VENTRICULAR RATE: 110 BPM
WBC # BLD AUTO: 9.86 THOUSAND/UL (ref 4.31–10.16)
WBC #/AREA URNS AUTO: ABNORMAL /HPF

## 2017-10-26 PROCEDURE — 85007 BL SMEAR W/DIFF WBC COUNT: CPT | Performed by: EMERGENCY MEDICINE

## 2017-10-26 PROCEDURE — 84484 ASSAY OF TROPONIN QUANT: CPT

## 2017-10-26 PROCEDURE — 93005 ELECTROCARDIOGRAM TRACING: CPT | Performed by: EMERGENCY MEDICINE

## 2017-10-26 PROCEDURE — 85610 PROTHROMBIN TIME: CPT | Performed by: EMERGENCY MEDICINE

## 2017-10-26 PROCEDURE — 80053 COMPREHEN METABOLIC PANEL: CPT | Performed by: EMERGENCY MEDICINE

## 2017-10-26 PROCEDURE — 85730 THROMBOPLASTIN TIME PARTIAL: CPT | Performed by: EMERGENCY MEDICINE

## 2017-10-26 PROCEDURE — 96361 HYDRATE IV INFUSION ADD-ON: CPT

## 2017-10-26 PROCEDURE — 71275 CT ANGIOGRAPHY CHEST: CPT

## 2017-10-26 PROCEDURE — 81001 URINALYSIS AUTO W/SCOPE: CPT

## 2017-10-26 PROCEDURE — 71020 HB CHEST X-RAY 2VW FRONTAL&LATL: CPT

## 2017-10-26 PROCEDURE — 96374 THER/PROPH/DIAG INJ IV PUSH: CPT

## 2017-10-26 PROCEDURE — 96375 TX/PRO/DX INJ NEW DRUG ADDON: CPT

## 2017-10-26 PROCEDURE — 99285 EMERGENCY DEPT VISIT HI MDM: CPT

## 2017-10-26 PROCEDURE — 36415 COLL VENOUS BLD VENIPUNCTURE: CPT | Performed by: EMERGENCY MEDICINE

## 2017-10-26 PROCEDURE — 85027 COMPLETE CBC AUTOMATED: CPT | Performed by: EMERGENCY MEDICINE

## 2017-10-26 PROCEDURE — 81002 URINALYSIS NONAUTO W/O SCOPE: CPT | Performed by: EMERGENCY MEDICINE

## 2017-10-26 RX ORDER — PROMETHAZINE HYDROCHLORIDE 25 MG/ML
25 INJECTION, SOLUTION INTRAMUSCULAR; INTRAVENOUS EVERY 6 HOURS PRN
Status: DISCONTINUED | OUTPATIENT
Start: 2017-10-26 | End: 2017-10-26

## 2017-10-26 RX ORDER — OXYCODONE HYDROCHLORIDE 10 MG/1
10 TABLET ORAL EVERY 6 HOURS PRN
Status: DISCONTINUED | OUTPATIENT
Start: 2017-10-26 | End: 2017-10-26

## 2017-10-26 RX ORDER — OXYCODONE HYDROCHLORIDE 5 MG/1
5 TABLET ORAL ONCE
Status: DISCONTINUED | OUTPATIENT
Start: 2017-10-26 | End: 2017-10-26

## 2017-10-26 RX ORDER — ALPRAZOLAM 0.5 MG/1
0.5 TABLET ORAL ONCE
Status: COMPLETED | OUTPATIENT
Start: 2017-10-26 | End: 2017-10-26

## 2017-10-26 RX ORDER — PROMETHAZINE HYDROCHLORIDE 25 MG/ML
25 INJECTION, SOLUTION INTRAMUSCULAR; INTRAVENOUS EVERY 6 HOURS
Status: DISCONTINUED | OUTPATIENT
Start: 2017-10-26 | End: 2017-10-27

## 2017-10-26 RX ORDER — SENNOSIDES 8.6 MG
1 TABLET ORAL DAILY
Status: DISCONTINUED | OUTPATIENT
Start: 2017-10-26 | End: 2017-10-26

## 2017-10-26 RX ORDER — FENTANYL CITRATE 50 UG/ML
50 INJECTION, SOLUTION INTRAMUSCULAR; INTRAVENOUS EVERY 2 HOUR PRN
Status: DISCONTINUED | OUTPATIENT
Start: 2017-10-26 | End: 2017-11-05

## 2017-10-26 RX ORDER — DIPHENHYDRAMINE HYDROCHLORIDE 50 MG/ML
12.5 INJECTION INTRAMUSCULAR; INTRAVENOUS EVERY 6 HOURS PRN
Status: DISCONTINUED | OUTPATIENT
Start: 2017-10-26 | End: 2017-10-26

## 2017-10-26 RX ORDER — OXYCODONE HYDROCHLORIDE 10 MG/1
20 TABLET ORAL
Status: DISCONTINUED | OUTPATIENT
Start: 2017-10-26 | End: 2017-11-06 | Stop reason: HOSPADM

## 2017-10-26 RX ORDER — OXYCODONE HYDROCHLORIDE 5 MG/1
5 TABLET ORAL EVERY 4 HOURS PRN
Status: DISCONTINUED | OUTPATIENT
Start: 2017-10-26 | End: 2017-10-26

## 2017-10-26 RX ORDER — OXYCODONE HYDROCHLORIDE 5 MG/1
5 TABLET ORAL ONCE
Status: COMPLETED | OUTPATIENT
Start: 2017-10-26 | End: 2017-10-26

## 2017-10-26 RX ORDER — METOCLOPRAMIDE HYDROCHLORIDE 5 MG/ML
10 INJECTION INTRAMUSCULAR; INTRAVENOUS ONCE
Status: COMPLETED | OUTPATIENT
Start: 2017-10-26 | End: 2017-10-26

## 2017-10-26 RX ORDER — SODIUM CHLORIDE 9 MG/ML
75 INJECTION, SOLUTION INTRAVENOUS CONTINUOUS
Status: DISCONTINUED | OUTPATIENT
Start: 2017-10-26 | End: 2017-11-04

## 2017-10-26 RX ORDER — FENTANYL 50 UG/H
1 PATCH TRANSDERMAL
Status: DISCONTINUED | OUTPATIENT
Start: 2017-10-26 | End: 2017-10-26

## 2017-10-26 RX ORDER — PROMETHAZINE HYDROCHLORIDE 25 MG/ML
25 INJECTION, SOLUTION INTRAMUSCULAR; INTRAVENOUS ONCE
Status: COMPLETED | OUTPATIENT
Start: 2017-10-26 | End: 2017-10-26

## 2017-10-26 RX ORDER — LORAZEPAM 2 MG/ML
0.5 INJECTION INTRAMUSCULAR EVERY 4 HOURS PRN
Status: DISCONTINUED | OUTPATIENT
Start: 2017-10-26 | End: 2017-10-27

## 2017-10-26 RX ORDER — OXYCODONE HYDROCHLORIDE 10 MG/1
10 TABLET ORAL EVERY 4 HOURS PRN
Status: DISCONTINUED | OUTPATIENT
Start: 2017-10-26 | End: 2017-10-26

## 2017-10-26 RX ORDER — LANOLIN ALCOHOL/MO/W.PET/CERES
3 CREAM (GRAM) TOPICAL
Status: DISCONTINUED | OUTPATIENT
Start: 2017-10-26 | End: 2017-11-06 | Stop reason: HOSPADM

## 2017-10-26 RX ORDER — OXYCODONE HYDROCHLORIDE 10 MG/1
10 TABLET ORAL ONCE
Status: COMPLETED | OUTPATIENT
Start: 2017-10-26 | End: 2017-10-26

## 2017-10-26 RX ORDER — POLYETHYLENE GLYCOL 3350 17 G/17G
17 POWDER, FOR SOLUTION ORAL DAILY
Status: DISCONTINUED | OUTPATIENT
Start: 2017-10-26 | End: 2017-11-06 | Stop reason: HOSPADM

## 2017-10-26 RX ORDER — SENNOSIDES 8.6 MG
2 TABLET ORAL DAILY
Status: DISCONTINUED | OUTPATIENT
Start: 2017-10-27 | End: 2017-10-28

## 2017-10-26 RX ORDER — ACETAMINOPHEN 325 MG/1
975 TABLET ORAL EVERY 8 HOURS SCHEDULED
Status: DISCONTINUED | OUTPATIENT
Start: 2017-10-26 | End: 2017-11-05

## 2017-10-26 RX ORDER — GABAPENTIN 400 MG/1
400 CAPSULE ORAL
Status: DISCONTINUED | OUTPATIENT
Start: 2017-10-26 | End: 2017-11-06 | Stop reason: HOSPADM

## 2017-10-26 RX ORDER — ACETAMINOPHEN 325 MG/1
650 TABLET ORAL EVERY 6 HOURS PRN
Status: DISCONTINUED | OUTPATIENT
Start: 2017-10-26 | End: 2017-10-26

## 2017-10-26 RX ORDER — MIRTAZAPINE 15 MG/1
15 TABLET, FILM COATED ORAL
Status: DISCONTINUED | OUTPATIENT
Start: 2017-10-26 | End: 2017-11-06 | Stop reason: HOSPADM

## 2017-10-26 RX ORDER — GABAPENTIN 100 MG/1
100 CAPSULE ORAL 3 TIMES DAILY
Status: DISCONTINUED | OUTPATIENT
Start: 2017-10-26 | End: 2017-10-26

## 2017-10-26 RX ORDER — CALCIUM CARBONATE 200(500)MG
500 TABLET,CHEWABLE ORAL 2 TIMES DAILY PRN
Status: DISCONTINUED | OUTPATIENT
Start: 2017-10-26 | End: 2017-11-06 | Stop reason: HOSPADM

## 2017-10-26 RX ADMIN — MIRTAZAPINE 15 MG: 15 TABLET, FILM COATED ORAL at 22:31

## 2017-10-26 RX ADMIN — PROMETHAZINE HYDROCHLORIDE 25 MG: 25 INJECTION INTRAMUSCULAR; INTRAVENOUS at 13:46

## 2017-10-26 RX ADMIN — OXYCODONE HYDROCHLORIDE 10 MG: 10 TABLET ORAL at 09:20

## 2017-10-26 RX ADMIN — OXYCODONE HYDROCHLORIDE 20 MG: 10 TABLET ORAL at 20:12

## 2017-10-26 RX ADMIN — ACETAMINOPHEN 975 MG: 325 TABLET, FILM COATED ORAL at 17:06

## 2017-10-26 RX ADMIN — METOCLOPRAMIDE 10 MG: 5 INJECTION, SOLUTION INTRAMUSCULAR; INTRAVENOUS at 06:13

## 2017-10-26 RX ADMIN — ALPRAZOLAM 0.5 MG: 0.5 TABLET ORAL at 17:05

## 2017-10-26 RX ADMIN — HYDROMORPHONE HYDROCHLORIDE 1 MG: 1 INJECTION, SOLUTION INTRAMUSCULAR; INTRAVENOUS; SUBCUTANEOUS at 07:52

## 2017-10-26 RX ADMIN — Medication 500 MG: at 20:13

## 2017-10-26 RX ADMIN — SODIUM CHLORIDE 1000 ML: 0.9 INJECTION, SOLUTION INTRAVENOUS at 07:52

## 2017-10-26 RX ADMIN — SENNOSIDES 8.6 MG: 8.6 TABLET, FILM COATED ORAL at 15:44

## 2017-10-26 RX ADMIN — PROMETHAZINE HYDROCHLORIDE 25 MG: 25 INJECTION INTRAMUSCULAR; INTRAVENOUS at 15:44

## 2017-10-26 RX ADMIN — OXYCODONE HYDROCHLORIDE 5 MG: 5 TABLET ORAL at 13:46

## 2017-10-26 RX ADMIN — OXYCODONE HYDROCHLORIDE 20 MG: 10 TABLET ORAL at 23:18

## 2017-10-26 RX ADMIN — OXYCODONE HYDROCHLORIDE 15 MG: 5 TABLET ORAL at 17:06

## 2017-10-26 RX ADMIN — OXYCODONE HYDROCHLORIDE 5 MG: 5 TABLET ORAL at 14:44

## 2017-10-26 RX ADMIN — PROMETHAZINE HYDROCHLORIDE 25 MG: 25 INJECTION INTRAMUSCULAR; INTRAVENOUS at 07:47

## 2017-10-26 RX ADMIN — FENTANYL 1 PATCH: 50 PATCH, EXTENDED RELEASE TRANSDERMAL at 15:43

## 2017-10-26 RX ADMIN — LORAZEPAM 0.5 MG: 2 INJECTION INTRAMUSCULAR; INTRAVENOUS at 22:31

## 2017-10-26 RX ADMIN — GABAPENTIN 400 MG: 400 CAPSULE ORAL at 22:31

## 2017-10-26 RX ADMIN — MELATONIN TAB 3 MG 3 MG: 3 TAB at 22:31

## 2017-10-26 RX ADMIN — POLYETHYLENE GLYCOL 3350 17 G: 17 POWDER, FOR SOLUTION ORAL at 18:43

## 2017-10-26 RX ADMIN — SODIUM CHLORIDE 75 ML/HR: 0.9 INJECTION, SOLUTION INTRAVENOUS at 17:07

## 2017-10-26 RX ADMIN — GABAPENTIN 100 MG: 100 CAPSULE ORAL at 15:44

## 2017-10-26 RX ADMIN — IOHEXOL 85 ML: 350 INJECTION, SOLUTION INTRAVENOUS at 09:48

## 2017-10-26 RX ADMIN — ACETAMINOPHEN 975 MG: 325 TABLET, FILM COATED ORAL at 22:31

## 2017-10-26 RX ADMIN — ENOXAPARIN SODIUM 40 MG: 40 INJECTION SUBCUTANEOUS at 18:40

## 2017-10-26 RX ADMIN — FENTANYL CITRATE 50 MCG/HR: at 17:08

## 2017-10-26 NOTE — PROGRESS NOTES
10/26/17 1800   Clinical Encounter Type   Visited With Patient and family together   Routine Visit Introduction; Follow-up   M[employer]on Encounters   M[employer]on Needs Prayer

## 2017-10-26 NOTE — CONSULTS
Consultation - Stefany Giang 42 y o  male MRN: [id number]    Unit/Bed#: [facility] 623-01 Encounter: [id number]      Assessment/Plan   36yM with stage IV adenocarcinoma of lung    1  Stage IV adenocarcinoma of right lung  -Progressive disease per imaging on this admission  -Patient desires continued treatment of disease   -Surgical pathology from 9/2015, RUL, reviewed - TTF-1 positive and CK7 patchy positive, P 40 negative   -Possible biopsy of new metastatic liver lesion  Will attempt to schedule biopsy during this admission  -patient likely require chemotherapy however this palliative hopefully decrease burden but is not curative  -will follow up CT chest abdomen pelvis as well as bone scan, CEA  -MRI brain/spine due to continued pain with possible spinal chord involvement  2  Cancer related pain  -Last palliative care note reviewed, along with current regimen   -Palliative care to continue to follow and adjust in consult  3 Dyspepsia and nausea  -Continue PPI therapy and Zofran PRN    4  Microcytic anemia  -Likely related to chronic disease  -Continue to trend H/H on routine blood draw  History of Present Illness     HPI: Stefany Giang is a 42y o  year old male with adenocarcinoma of the lung with metastesis who presents  as recommended admission by Dr Bijan Abraham for further acute workup and care of his progressive disease as well as for worsening intractable pain  He has evidence of progressive disease on admission, including new metastasis to left adrenal and left apical lung and liver  Per charting, he was diagnosed in 9/2015 with progressive scapular pain and large right upper lobe lesion  Biopsy of RUL lesion was performed at that time and sent for molecular testing  He was started on therapy in October of 2015 with study [id number] but had noncompliance and was removed from the treatment group    He underwent Opdivo 3 mg/kg IV Q2 weeks in December of 2015, but eventually developed hand arthropathy and RF-positivity  He therefore started steroid therapy and Opdivo was held  He subsequently developed cutaneous nodules and had more diffuse pain that was treated with PO narcotic regimen after another course of steroids  Currently today his major complaint is pain in the right upper thoracic region as well as right anterior chest and right upper lower abdominal quadrants  He states that he also has fatigue from an inability to sleep secondary to pain and nausea which he states has been intermittent  Inpatient consult to Oncology  Date/Time: 10/26/2017 2:47 PM  Performed by: Luna Gotti  Authorized by: Jacinto WOOTEN           Review of Systems   Constitutional: Positive for fatigue  Negative for chills and fever  HENT: Negative for trouble swallowing  Eyes: Negative for discharge and redness  Respiratory: Positive for shortness of breath  Negative for choking and wheezing  Cardiovascular: Negative for chest pain, palpitations and leg swelling  Gastrointestinal: Positive for abdominal pain and nausea  Negative for constipation and diarrhea  Genitourinary: Negative for dysuria  Musculoskeletal: Positive for back pain  Negative for neck pain and neck stiffness  Neurological: Negative for dizziness and syncope  Psychiatric/Behavioral: Negative for agitation         Historical Information   Past Medical History:   Diagnosis Date    Anxiety     GERD (gastroesophageal reflux disease)     Lung cancer ([facility] [address] )      Past Surgical History:   Procedure Laterality Date    PORTACATH PLACEMENT       Social History   History   Alcohol Use No     History   Drug Use    Types: Marijuana     Comment: daily smoker     History   Smoking Status    Former Smoker    Quit date: 12/1/2015   Smokeless Tobacco    Never Used     Family History: No significant history of lung cancer    Meds/Allergies   current meds:   Current Facility-Administered Medications   Medication Dose Route Frequency    acetaminophen (TYLENOL) tablet 650 mg  650 mg Oral Q6H PRN    enoxaparin (LOVENOX) subcutaneous injection 40 mg  40 mg Subcutaneous Daily    fentaNYL (DURAGESIC) 50 mcg/hr TD 72 hr patch 1 patch  1 patch Transdermal Q72H    gabapentin (NEURONTIN) capsule 100 mg  100 mg Oral TID    oxyCODONE (ROXICODONE) IR tablet 5 mg  5 mg Oral Q4H PRN    polyethylene glycol (MIRALAX) packet 17 g  17 g Oral Daily    promethazine (PHENERGAN) injection 25 mg  25 mg Intravenous Q6H PRN    senna (SENOKOT) tablet 8 6 mg  1 tablet Oral Daily    and PTA meds:   Prior to Admission Medications   Prescriptions Last Dose Informant Patient Reported? Taking?   fentaNYL (DURAGESIC) 50 mcg/hr 10/26/2017 at 0200  Yes Yes   Sig: Place 1 patch on the skin every third day   gabapentin (NEURONTIN) 100 mg capsule Past Week at Unknown time  Yes Yes   Sig: Take 100 mg by mouth 3 (three) times a day   oxyCODONE (OXY-IR) 5 MG capsule 10/26/2017 at Unknown time  Yes Yes   Sig: Take 10 mg by mouth every 4 (four) hours as needed for moderate pain        Facility-Administered Medications: None     Allergies   Allergen Reactions    Zofran [Ondansetron] GI Intolerance    Nicotine Rash       Objective   Vitals: Blood pressure 141/91, pulse 94, temperature (!) 96 1 °F (35 6 °C), temperature source Oral, resp  rate 16, height 6' 3" (1 905 m), weight 80 7 kg (178 lb), SpO2 98 %  Intake/Output Summary (Last 24 hours) at 10/26/17 1446  Last data filed at 10/26/17 1307   Gross per 24 hour   Intake             1000 ml   Output                0 ml   Net             1000 ml     Invasive Devices     Peripheral Intravenous Line            Peripheral IV 10/26/17 Right Antecubital less than 1 day                Physical Exam   Constitutional:   Thin appearing, currently in moderate pain no diaphoresis   HENT:   Head: Normocephalic  Eyes: Conjunctivae and EOM are normal    Neck: No tracheal deviation present  Cardiovascular: Normal rate and regular rhythm  Pulmonary/Chest:   Decreased breath sounds bilaterally right worse than left however no wheezing appreciated on exam   Abdominal:   Soft, tender to palpation in right upper and right lower quadrants, no rebound tenderness, bowel sounds present  Musculoskeletal: He exhibits no edema  Neurological:   Awake, alert, oriented x3, able to follow commands and answer questions appropriately   Skin: Skin is warm and dry  Psychiatric: He has a normal mood and affect  Lab Results: I have personally reviewed pertinent reports  Lab Results   Component Value Date    GLUCOSE 104 10/26/2017    CALCIUM 9 9 10/26/2017     (L) 10/26/2017    K 4 2 10/26/2017    CO2 25 10/26/2017    CL 99 (L) 10/26/2017    BUN 10 10/26/2017    CREATININE 0 91 10/26/2017     Lab Results   Component Value Date    WBC 9 86 10/26/2017    HGB 10 9 (L) 10/26/2017    HCT 33 3 (L) 10/26/2017    MCV 62 (L) 10/26/2017     (H) 10/26/2017       Imaging Studies: I have personally reviewed pertinent reports  CTA ED chest PE study [[id number]] Collected: 10/26/17 3768   Order Status: Completed Updated: 10/26/17 1013   Narrative:     CTA - CHEST WITH IV CONTRAST - PULMONARY ANGIOGRAM    INDICATION: Tachycardia, tachypnea, lung cancer  COMPARISON: Chest CT 12/28/2016   CT abdomen 7/21/2017    TECHNIQUE: CTA examination of the chest was performed using angiographic technique according to a protocol specifically tailored to evaluate for pulmonary embolism   Reformatted images were created in axial, sagittal, and coronal planes   In addition,   coronal 3D MIP postprocessing was performed on the acquisition scanner       Radiation dose length product (DLP) for this visit:  381 84 mGy-cm    This examination, like all CT scans performed in the [facility], was performed utilizing techniques to minimize radiation dose exposure, including the use of iterative   reconstruction and automated exposure control      IV Contrast:  85 mL of iohexol (OMNIPAQUE)          FINDINGS:    PULMONARY ARTERIAL TREE:  No pulmonary embolus is seen  LUNGS: Liza Alvarez is a new, very large mass occupying the majority of the anteromedial right upper lobe, measuring approximately 9 0 x 14 5 x 14 6 cm in greatest AP, transverse and longitudinal dimensions   This mass is inseparable from the mediastinum, and   compresses the hilum   There is masslike consolidation in the right middle lobe, which may tumor versus post obstructive atelectasis   Pre-existing calcified masses in the posterior aspect of the right upper lobe appear unchanged   There is a new 5 mm   left apical nodule on image 4/12   Bilateral upper lobe emphysema again identified       PLEURA:  Unremarkable  HEART/AORTA:  Unremarkable for patient's age  MEDIASTINUM AND ANDREW:  Right hilar adenopathy  CHEST WALL AND LOWER NECK:       Normal     VISUALIZED STRUCTURES IN THE UPPER ABDOMEN: Liza Kim are multiple new ill-defined hypodense masses compatible with metastatic disease throughout the visualized liver, largest discretely measurable lesion measuring approximately 4 8 x 3 4 cm at the   posterior margin of the right hepatic lobe   Partially imaged right adrenal mass appears increased, measuring approximately 11 0 x 7 5 cm --- previously 10 5 x 6 0 cm   Increased nodular morphology of the left adrenal gland suspicious for metastatic   disease  OSSEOUS STRUCTURES:  No acute fracture or destructive osseous lesion  Impression:       No pulmonary embolism  New very large right upper lobe mass, which compresses the hilum, and is inseparable from the mediastinum   Increased right middle lobe consolidation representing tumor versus post obstructive atelectasis   Right hilar adenopathy  New 5 cm left apical nodule  Worsening large right adrenal metastatic mass   New extensive hepatic metastatic disease   New left adrenal metastatic disease        Workstation performed: [id number]       EKG: I have personally reviewed pertinent reports  Sinus tachycardia and rightward axis  VTE Prophylaxis: Enoxaparin (Lovenox)    Code Status: Level 1 - Full Code    Counseling / Coordination of Care  Total floor / unit time spent today 30 minutes  Greater than 50% of total time was spent with the patient and / or family counseling and / or coordination of care  A description of the counseling / coordination of care: impressions, treatment course

## 2017-10-26 NOTE — PROGRESS NOTES
10/26/17 1700   Stress Factors   Patient Stress Factors Other (Comment)   Family Stress Factors Other (Comment)   Coping Responses   Patient Coping Other (Comment)   Family Coping Fearful;Open/discussion; Sadness   Family Spiritual Assessment   Fear of Dying (yes)   Plan of Care   Care Plan Initiated Yes   Comments  receiving medication , talked to wife at length about   Provided pastoral presence  listening, and prayer support     Assessment Completed by: Unit visit

## 2017-10-26 NOTE — ED NOTES
Patient transported to [facility], [address] [facility]  10/26/17 9496

## 2017-10-26 NOTE — Clinical Note
Case was discussed with FM and the patient's admission status was agreed to be Admission Status: inpatient status to the service of Dr Fernando Ji

## 2017-10-26 NOTE — PROGRESS NOTES
Assessment  1  Adenocarcinoma of right lung (162 9) (C34 91)    Plan  Adenocarcinoma of right lung    · Drink plenty of fluids ; Status:Complete;   Done: 53BGI6341   Ordered; For:Adenocarcinoma of right lung; Ordered By:Primitivo Jacinto;   · Follow-up visit in 1 month Evaluation and Treatment  Follow-up  Status: Hold For -  Scheduling  Requested for: 76EZD2857   Ordered; For: Adenocarcinoma of right lung; Ordered By: Giuseppe Little Performed:  Due: 36TTQ8299    Discussion/Summary  Discussion Summary:   In summary, this is a 77-year-old male history of advanced adenocarcinoma of the lung as outlined  has significant pain in the interscapular region  He believes this is reminiscent of his initial presenting symptoms had previously been advised to proceed with biopsy of the adrenal to investigate for any actionable molecular abnormalities  This was not accomplished, however  previous rheumatoid symptoms have entirely resolved  He is open to the idea of Re starting immunotherapy and I think that is a reasonable option  his pain situation indicates the possibility of spinal cord involvement  MRI is indicated, in my opinion  order to expedite his workup, obtain multiple medical opinion simultaneously, and adjust his pain medications which have previously been delicate in adjustment, inpatient hospitalization is indicated  will be making arrangements for this and proceed accordingly  reviewed the above with the patient and his wife  They voiced understanding and agreement  Counseling Documentation With Imm: The patient, patient's family was counseled regarding diagnostic results,-- instructions for management,-- patient and family education,-- impressions  total time of encounter was 40 minutes  Chief Complaint  Chief Complaint Free Text Note Form: Follow-up regarding lung cancer  History of Present Illness  HPI: September 2015- patient presented with worsening right scapular pain over 3-4 weeks   CT chest showed a 16 x 13 x 12 cm lesion in the right upper lobe extending to the hilum  Indeterminate right adrenal invasion measuring 2 5 cm is noted  Biopsy of the pulmonary mass Showed a poorly differentiated carcinoma, TTF-1 and CK 7 positive  P  40, negative  Ultimately this was felt to represent a poorly differentiated adenocarcinoma  2016â CT showed continued left upper lobe mass and 4 x 4 0 5 cm compared with the prior study of December, 12 5 x 5 5 cm  Patient was started on Opdivo 3 mg/kg IV every 2 weeks  2016âpatient developed significant bilateral hand arthropathy with erythema, swelling  Rheumatoid factor strongly positive  Opdivo suspended  Steroids therapy initiated  2016- started with increased cutaneous nodules similar to prior rheumatoid nodules  Treated with steroids with resolution  2016- started to have increase in pain symptoms, paraesthesias head to toe  Seems to respond well to oxycodone  Previous Therapy:   October 2015 - He was on [id number], but was taken off study for noncompliance he had more than 6 weeks of no shows  Cycle # 3 was 1/8/16  JTWS5213I 1200mg and Carboplatin AUC 6 with Neulasta support day 2  2016â Patient was started on Opdivo 3 mg/kg IV every 2 weeks  Interval History: [place] teeth need to be removed  some fluctuating right scapular area  Review of Systems  Complete-Male:   Constitutional: no chills  Eyes: No complaints of eye pain, no red eyes, no discharge from eyes, no itchy eyes  ENT: no complaints of earache, no hearing loss, no nosebleeds, no nasal discharge, no sore throat, no hoarseness  Cardiovascular: No complaints of slow heart rate, no fast heart rate, no chest pain, no palpitations, no leg claudication, no lower extremity  Respiratory: shortness of breath during exertion--    The patient presents with complaints of intermittent episodes of cough, described as non-productive  The patient presents with complaints of nausea (better with Zofran  )     The patient presents with complaints of constipation starting about 1-2 weeks ago  Genitourinary: No complaints of dysuria, no incontinence, no hesitancy, no nocturia, no genital lesion, no testicular pain  Musculoskeletal: as noted in HPI  Integumentary: No complaints of skin rash or skin lesions, no itching, no skin wound, no dry skin  Neurological: No compliants of headache, no confusion, no convulsions, no numbness or tingling, no dizziness or fainting, no limb weakness, no difficulty walking  Psychiatric: Is not suicidal, no sleep disturbances, no anxiety or depression, no change in personality, no emotional problems  Endocrine: No complaints of proptosis, no hot flashes, no muscle weakness, no erectile dysfunction, no deepening of the voice, no feelings of weakness  Hematologic/Lymphatic: No complaints of swollen glands, no swollen glands in the neck, does not bleed easily, no easy bruising  Active Problems  1  Adenocarcinoma of right lung (162 9) (C34 91)   2  Adrenal mass (255 9) (E27 9)   3  Anxiety (300 00) (F41 9)   4  Arthralgia, unspecified joint (719 40) (M25 50)   5  Cancer related pain (338 3) (G89 3)   6  Constipation (564 00) (K59 00)   7  Continuous opioid dependence (304 01) (F11 20)   8  Decreased appetite (783 0) (R63 0)   9  Decreased libido (799 81) (R68 82)   10  Depression (311) (F32 9)   11  GERD (gastroesophageal reflux disease) (530 81) (K21 9)   12  Insomnia (780 52) (G47 00)   13  Irritable mood (799 22) (R45 4)   14  Metastasis to adrenal gland (198 7) (C79 70)   15  Nausea (787 02) (R11 0)   16  Neuropathic pain (729 2) (M79 2)   17  Rheumatoid factor positive (795 79) (R76 8)   18  Under care of palliative care specialist (V66 7) (Z51 5)    Past Medical History  1  History of Chemotherapy-induced nausea (787 02,E933 1) (R11 0,T45  1X5A)   2  History of Epigastric pain (789 06) (R10 13)   3  History of gastritis (V12 79) (Z87 19)   4   History of Hospital discharge follow-up (V67 59) (Z09)   5  History of Subcutaneous nodule (782 2) (R22 9)   6  History of Swelling (782 3) (R60 9)    Family History  Mother    1  No pertinent family history  Father    2  No pertinent family history    Social History   · Current some day smoker (305 1) (F17 200)   · Five children   · Former consumption of alcohol (V11 3) (Z87 898)   ·    · Occupation    Current Meds   1  FentaNYL 12 MCG/HR Transdermal Patch 72 Hour; APPLY 1 PATCH EVERY 3 DAYS; Therapy: 59BQS4519 to (Evaluate:08Nov2017); Last Rx:09Oct2017 Ordered   2  FentaNYL 25 MCG/HR Transdermal Patch 72 Hour; APPLY 1 PATCH EVERY 3 DAYS; Therapy: 44FLE2986 to (Evaluate:08Nov2017); Last Rx:09Oct2017 Ordered   3  Gabapentin 400 MG Oral Capsule; TAKE 1 CAPSULE AT BEDTIME; Therapy: 59AFL5931 to (Tawanna Katz)  Requested for: 19GLI5609; Last   Rx:01Jun2017 Ordered   4  Ibuprofen 600 MG Oral Tablet; take 1 tablet by mouth every 8 hours as needed for pain; Therapy: 43Uhv7320 to (Evaluate:19Nov2017)  Requested for: 13Jjj7855; Last   Rx:11Rvc1947 Ordered   5  Lactulose 20 GM/30ML Oral Solution; 15-30 mL by mouth daily  May take up to 3 extra   doses as needed to prevent constipation; Therapy: 63DIW4225 to ([phone])  Requested for: 20NJL4328; Last   Rx:09Oct2017 Ordered   6  Mirtazapine 15 MG Oral Tablet; TAKE 1 TABLET AT BEDTIME; Therapy: (Recorded:97Plo4460) to Recorded   7  Naproxen TABS; Therapy: (IBCUSJYH:37AYU8484) to Recorded   8  Nicotine 21 MG/24HR Transdermal Patch 24 Hour; APPLY 1 PATCH Daily; Therapy: 78Seb1303 to (Evaluate:13Oct2017)  Requested for: 65But6464; Last   Rx:91Qqp5435 Ordered   9  OxyCODONE HCl - 15 MG Oral Tablet; TAKE 1 TABLET EVERY 4 HOURS AS NEEDED; Therapy: 57WBK2279 to (Evaluate:08Nov2017); Last Rx:09Oct2017 Ordered   10  Pantoprazole Sodium 40 MG Oral Tablet Delayed Release; TAKE 1 TABLET DAILY 30    MINUTES BEFORE BREAKFAST;     Therapy: 52VXP7052 to (Evaluate:56Lky8298)  Requested for: 89OUO7300; Last    Rx:90Mjq6600 Ordered    Allergies  1  Nicotine Transdermal System 21-14-7 MG/24HR KIT   2  Ondansetron HCl TABS    Vitals  Vital Signs    Recorded: 80FBQ0788 03:14PM   Temperature 98 1 F   Heart Rate 104   Respiration 15   Systolic 460   Diastolic 78   Height 6 ft 3 in   Weight 178 lb    BMI Calculated 22 25   BSA Calculated 2 09   O2 Saturation 98   Pain Scale 10     Physical Exam    Constitutional   General appearance: No acute distress, well appearing and well nourished  Eyes   Conjunctiva and lids: No swelling, erythema, or discharge  Ears, Nose, Mouth, and Throat   External inspection of ears and nose: Normal     Oropharynx: Normal with no erythema, edema, exudate or lesions  Pulmonary   Auscultation of lungs: Clear to auscultation, equal breath sounds bilaterally, no wheezes, no rales, no rhonci  Cardiovascular   Auscultation of heart: Normal rate and rhythm, normal S1 and S2, without murmurs  Examination of extremities for edema and/or varicosities: Normal     Abdomen   Abdomen: Non-tender, no masses  Liver and spleen: No hepatomegaly or splenomegaly  Lymphatic   Palpation of lymph nodes in neck: No lymphadenopathy  Musculoskeletal   Gait and station: Normal     Skin   Skin and subcutaneous tissue: Normal without rashes or lesions  Neurologic   Cranial nerves: Cranial nerves 2-12 intact  Psychiatric   Orientation to person, place and time: Normal          Future Appointments    Date/Time Provider Specialty Site   11/09/2017 11:00 AM YANCY Curry  [address]     Signatures   Electronically signed by : YANCY Evans  Oct 25 2017  3:38PM EST                       (Author)

## 2017-10-26 NOTE — ED ATTENDING ATTESTATION
I, [address], DO, saw and evaluated the patient  I have discussed the patient with the resident/non-physician practitioner and agree with the resident's/non-physician practitioner's findings, Plan of Care, and MDM as documented in the resident's/non-physician practitioner's note, except where noted  All available labs and Radiology studies were reviewed  At this point I agree with the current assessment done in the Emergency Department  I have conducted an independent evaluation of this patient a history and physical is as follows:    66-year-old male presents multiple complaints including cold sweats, increased right-sided chest pain, shortness of breath, nausea vomiting  Patient has stage IV lung cancer and has not been treated for the past 2 years  Plan to restart treatment  Was directed to come to emergency department today further evaluation  On exam-appears uncomfortable, heart tachycardic, lungs diminished on right with chest wall tenderness, abdomen soft    Plan-CTA to rule out PE, check labs, pain control, will need admit    Critical Care Time  CritCare Time

## 2017-10-26 NOTE — ED PROVIDER NOTES
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  Patient presents with multiple complaints, including cold sweats, increased right sided chest pain uncontrolled by his current pain medication regimen, SOB, nausea, vomiting  Patient was diagnosed with stage IV lung cancer in 2015 and did receive treatment for a short period of time  However, treatment was poorly tolerated and patient has not been treated for nearly 2 years  Patient follows with Dr Isak Varma as an outpatient  Dr Isak Varma recommended that he present to the hospital for further evaluation  Plan: Tachycardia and tachypnea with chest pain in a patient with known active cancer will be evaulated with CTA to rule out PE/VTE  CT will also evaluate for progression of cancer which is likely cause of pain and other symptoms  Prior to Admission Medications   Prescriptions Last Dose Informant Patient Reported? Taking?   fentaNYL (DURAGESIC) 50 mcg/hr 10/26/2017 at 0200  Yes Yes   Sig: Place 1 patch on the skin every third day   gabapentin (NEURONTIN) 100 mg capsule Past Week at Unknown time  Yes Yes   Sig: Take 100 mg by mouth 3 (three) times a day   oxyCODONE (OXY-IR) 5 MG capsule 10/26/2017 at Unknown time  Yes Yes   Sig: Take 10 mg by mouth every 4 (four) hours as needed for moderate pain        Facility-Administered Medications: None       Past Medical History:   Diagnosis Date    Anxiety     GERD (gastroesophageal reflux disease)     Lung cancer ([facility] [address] )        Past Surgical History:   Procedure Laterality Date    PORTACATH PLACEMENT         History reviewed  No pertinent family history  I have reviewed and agree with the history as documented      Social History   Substance Use Topics    Smoking status: Former Smoker     Quit date: 12/1/2015    Smokeless tobacco: Never Used    Alcohol use No        Review of Systems   Constitutional: Positive for appetite change, diaphoresis and unexpected weight change  Negative for fever  HENT: Negative for congestion, rhinorrhea and sore throat  Eyes: Negative for pain, discharge and visual disturbance  Respiratory: Positive for shortness of breath  Negative for cough and wheezing  Cardiovascular: Positive for chest pain  Negative for palpitations and leg swelling  Gastrointestinal: Positive for nausea and vomiting  Negative for abdominal pain, blood in stool, constipation and diarrhea  Genitourinary: Negative for dysuria, flank pain and hematuria  Musculoskeletal: Negative for arthralgias and myalgias  Skin: Negative for rash and wound  Allergic/Immunologic: Negative for environmental allergies and food allergies  Neurological: Negative for dizziness, seizures, weakness and numbness  Hematological: Negative for adenopathy  Psychiatric/Behavioral: Negative for confusion and hallucinations  Physical Exam  ED Triage Vitals [10/26/17 0601]   Temperature Pulse Respirations Blood Pressure SpO2   (!) 96 1 °F (35 6 °C) (!) 114 (!) 24 131/86 98 %      Temp Source Heart Rate Source Patient Position - Orthostatic VS BP Location FiO2 (%)   Oral Monitor Lying Right arm --      Pain Score       Worst Possible Pain           Orthostatic Vital Signs  Vitals:    10/26/17 1745 10/26/17 1845 10/26/17 1945 10/26/17 2045   BP: 135/82 123/78 132/82 127/91   Pulse: (!) 108 90 83 93   Patient Position - Orthostatic VS:           Physical Exam   Constitutional: He is oriented to person, place, and time  He appears cachectic  He has a sickly appearance  He appears distressed  Patient appears to be in significant pain  He moves frequently to find comfortable position  Frequent dry heaves  HENT:   Head: Normocephalic and atraumatic     Right Ear: External ear normal    Left Ear: External ear normal    Nose: Nose normal    Eyes: EOM are normal  Pupils are equal, round, and reactive to light  No scleral icterus  Cardiovascular: Normal rate, regular rhythm and normal heart sounds  No murmur heard  Pulmonary/Chest: Effort normal  No respiratory distress  He has no wheezes  He has no rales  He exhibits tenderness  Significant tenderness to palpation over right upper chest wall extending into right shoulder  Diminished breath sounds of right anterior lung field  Breath sounds equal over posterior lung fields bilaterally  Abdominal: Soft  Bowel sounds are normal  He exhibits no distension  There is no tenderness  There is no guarding  Musculoskeletal: Normal range of motion  He exhibits no deformity  Neurological: He is alert and oriented to person, place, and time  He exhibits normal muscle tone  Skin: Skin is warm and dry  He is not diaphoretic  Psychiatric: He has a normal mood and affect  His behavior is normal  Judgment and thought content normal    Nursing note and vitals reviewed        ED Medications  Medications   polyethylene glycol (MIRALAX) packet 17 g (17 g Oral Given 10/26/17 1843)   enoxaparin (LOVENOX) subcutaneous injection 40 mg (40 mg Subcutaneous Given 10/26/17 1840)   sodium chloride 0 9 % infusion (75 mL/hr Intravenous New Bag 10/26/17 1707)   acetaminophen (TYLENOL) tablet 975 mg (975 mg Oral Given 10/26/17 1706)   senna (SENOKOT) tablet 17 2 mg (not administered)   oxyCODONE (ROXICODONE) IR tablet 15 mg (15 mg Oral Given 10/26/17 1706)   oxyCODONE (ROXICODONE) immediate release tablet 20 mg (20 mg Oral Given 10/26/17 2012)   mirtazapine (REMERON) tablet 15 mg (not administered)   melatonin tablet 3 mg (not administered)   LORazepam (ATIVAN) 2 mg/mL injection 0 5 mg (not administered)   promethazine (PHENERGAN) injection 25 mg (not administered)   fentaNYL 1250 mcg in sodium chloride 0 9% 125mL drip (50 mcg/hr Intravenous New Bag 10/26/17 1708)   fentanyl citrate (PF) 100 MCG/2ML 50 mcg (not administered)   gabapentin (NEURONTIN) capsule 400 mg (not administered)   calcium carbonate (TUMS) chewable tablet 500 mg (500 mg Oral Given 10/26/17 2013)   pneumococcal 13-valent conjugate vaccine (PREVNAR-13) IM injection 0 5 mL (not administered)   metoclopramide (REGLAN) injection 10 mg (10 mg Intravenous Given 10/26/17 0613)   oxyCODONE (ROXICODONE) immediate release tablet 10 mg (10 mg Oral Given 10/26/17 0920)   promethazine (PHENERGAN) injection 25 mg (25 mg Intravenous Given 10/26/17 0747)   HYDROmorphone (DILAUDID) 1 mg/mL injection 1 mg (1 mg Intravenous Given 10/26/17 0752)   sodium chloride 0 9 % bolus 1,000 mL (0 mL Intravenous Stopped 10/26/17 1307)   iohexol (OMNIPAQUE) 350 MG/ML injection (MULTI-DOSE) 85 mL (85 mL Intravenous Given 10/26/17 0948)   promethazine (PHENERGAN) injection 25 mg (25 mg Intramuscular Given 10/26/17 1346)   oxyCODONE (ROXICODONE) IR tablet 5 mg (5 mg Oral Given 10/26/17 1346)   ALPRAZolam (XANAX) tablet 0 5 mg (0 5 mg Oral Given 10/26/17 1705)       Diagnostic Studies  Results Reviewed     Procedure Component Value Units Date/Time    POCT urinalysis dipstick [[id number]]  (Normal) Resulted:  10/26/17 1018    Lab Status:  Final result Specimen:  Urine Updated:  10/26/17 1151     Color, UA yellow    Urine Microscopic [[id number]]  (Abnormal) Collected:  10/26/17 1023    Lab Status:  Final result Specimen:  Urine from Urine, Clean Catch Updated:  10/26/17 1033     RBC, UA 2-4 (A) /hpf      WBC, UA None Seen /hpf      Epithelial Cells None Seen /hpf      Bacteria, UA None Seen /hpf      Hyaline Casts, UA 3-5 (A) /lpf     ED Urine Macroscopic [[id number]]  (Abnormal) Collected:  10/26/17 1023    Lab Status:  Final result Specimen:  Urine Updated:  10/26/17 1018     Color, UA Yellow     Clarity, UA Clear     pH, UA 7 0     Leukocytes, UA Negative     Nitrite, UA Negative     Protein, UA 30 (1+) (A) mg/dl      Glucose, UA Negative mg/dl      Ketones, UA Negative mg/dl      Urobilinogen, UA 0 2 E U /dl      Bilirubin, UA Negative     Blood, UA Trace (A)     Specific [place], UA 1 015    Narrative:       CLINITEK RESULT    CBC and differential [[id number]]  (Abnormal) Collected:  10/26/17 0615    Lab Status:  Final result Specimen:  Blood from Arm, Right Updated:  10/26/17 0758     WBC 9 86 Thousand/uL      RBC 5 37 Million/uL      Hemoglobin 10 9 (L) g/dL      Hematocrit 33 3 (L) %      MCV 62 (L) fL      MCH 20 3 (L) pg      MCHC 32 7 g/dL      RDW 17 1 (H) %      MPV 8 7 (L) fL      Platelets 681 (H) Thousands/uL      nRBC 0 /100 WBCs     Narrative: This is an appended report  These results have been appended to a previously verified report  Protime-INR [[id number]]  (Abnormal) Collected:  10/26/17 0615    Lab Status:  Final result Specimen:  Blood from Arm, Right Updated:  10/26/17 0718     Protime 15 0 (H) seconds      INR 1 17 (H)    APTT [[id number]]  (Normal) Collected:  10/26/17 0615    Lab Status:  Final result Specimen:  Blood from Arm, Right Updated:  10/26/17 0718     PTT 29 seconds     Narrative:          Therapeutic Heparin Range = 60-90 seconds    Comprehensive metabolic panel [[id number]]  (Abnormal) Collected:  10/26/17 0615    Lab Status:  Final result Specimen:  Blood from Arm, Right Updated:  10/26/17 0646     Sodium 133 (L) mmol/L      Potassium 4 2 mmol/L      Chloride 99 (L) mmol/L      CO2 25 mmol/L      Anion Gap 9 mmol/L      BUN 10 mg/dL      Creatinine 0 91 mg/dL      Glucose 104 mg/dL      Calcium 9 9 mg/dL      AST 60 (H) U/L      ALT 25 U/L      Alkaline Phosphatase 196 (H) U/L      Total Protein 9 1 (H) g/dL      Albumin 3 5 g/dL      Total Bilirubin 0 38 mg/dL      eGFR 122 ml/min/1 73sq m     Narrative:         National Kidney Disease Education Program recommendations are as follows:  GFR calculation is accurate only with a steady state creatinine  Chronic Kidney disease less than 60 ml/min/1 73 sq  meters  Kidney failure less than 15 ml/min/1 73 sq  meters  POCT troponin [[id number]]  (Normal) Collected:  10/26/17 0620    Lab Status:  Final result Updated:  10/26/17 0633     POC Troponin I 0 00 ng/ml      Specimen Type VENOUS    Narrative:         [employer] i-Stat handheld analyzer 99% cutoff is > 0 08ng/mL in network Emergency Departments    o cTnI 99% cutoff is useful only when applied to patients in the clinical setting of myocardial ischemia  o cTnI 99% cutoff should be interpreted in the context of clinical history, ECG findings and possibly cardiac imaging to establish correct diagnosis  o cTnI 99% cutoff may be suggestive but clearly not indicative of a coronary event without the clinical setting of myocardial ischemia  CTA ED chest PE study   Final Result by Anselmo Julien MD (10/26 1012)      No pulmonary embolism  New very large right upper lobe mass, which compresses the hilum, and is inseparable from the mediastinum  Increased right middle lobe consolidation representing tumor versus post obstructive atelectasis  Right hilar adenopathy  New 5 cm left apical nodule  Worsening large right adrenal metastatic mass  New extensive hepatic metastatic disease  New left adrenal metastatic disease  Workstation performed: [id number]         XR chest 2 views   ED Interpretation by Vicki Johansen MD (10/26 1163)   Large right-sided opacity extending from the right hilum to the lung periphery consistent with known lung adenocarcinoma        Final Result by Patricia Garzon MD (10/26 1312)      No new consolidation seen   No congestion seen   Right lung mass, unchanged from the previous study of October 1, 2017         Workstation performed: [id number]               Procedures  ECG 12 Lead Documentation  Date/Time: 10/26/2017 8:41 AM  Performed by: Carmencita Patel  Authorized by: Aissatou Barakat     ECG reviewed by me, the ED Provider: yes    Patient location:  ED  Previous ECG:     Previous ECG:  Compared to current    Comparison ECG info:  October 1, 2017    Similarity:  No change    Comparison to cardiac monitor: Yes    Interpretation:     Interpretation: abnormal    Rate:     ECG rate:  110    ECG rate assessment: tachycardic    Rhythm:     Rhythm: sinus tachycardia    Ectopy:     Ectopy: none    QRS:     QRS axis:  Normal    QRS intervals:  Normal  Conduction:     Conduction: normal    ST segments:     ST segments:  Normal  T waves:     T waves: normal    Comments:      Sinus tachycardia with no signs of ischemia, in particular no T-wave inversions or ST segment changes  Phone Consults  ED Phone Contact    ED Course  ED Course as of Oct 26 2101   Thu Oct 26, 2017   6996 Blood Pressure: 131/86   0627 Temperature: (!) 96 1 °F (35 6 °C)   0627 Pulse: (!) 114   0627 Respirations: (!) 24   0627 SpO2: 98 %   0627 Triage vitals noted  Tachycardia, tachypnea  0740 Baseline microcytic anemia on CBC  0740 CMP with hyponatremia, hypochloremia, and increased AST and alkaline phosphatase from baseline  0741 INR: (!) 1 17   0741 POC Troponin I: 0 00   0741 PTT: 29   0829 FM attending paged for admission  MDM  Number of Diagnoses or Management Options  Adenocarcinoma of right lung [facility]): established and worsening  Adenocarcinoma of right lung, stage 4 ([facility] [address] ): established and worsening  Continuous opioid dependence ([facility] [address] ): established and worsening  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 [facility]): established and worsening  Pain of metastatic malignancy: established and worsening  Tachycardia: new and requires workup  Tachypnea: new and requires workup  Under care of palliative care physician:   Diagnosis management comments: Patient will require admission due to advanced neoplasm with no treatment or recent workup  Patient admitted to [facility] service   Patient signed out to Dr Kacey Ryan pending admission and CTA PE study results          Amount and/or Complexity of Data Reviewed  Clinical lab tests: ordered and reviewed  Tests in the radiology section of CPT®: ordered and reviewed  Decide to obtain previous medical records or to obtain history from someone other than the patient: yes  Review and summarize past medical records: yes  Discuss the patient with other providers: yes  Independent visualization of images, tracings, or specimens: yes    Risk of Complications, Morbidity, and/or Mortality  Presenting problems: high  Diagnostic procedures: minimal  Management options: moderate    Patient Progress  Patient progress: stable    CritCare Time    Disposition  Final diagnoses:   [facility] ([facility] )   Pain of metastatic malignancy   Tachycardia   Tachypnea   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 ([facility] )   Adenocarcinoma of right lung, stage 4 ([place] [address] )   Continuous opioid dependence ([place] [address] )   Under care of palliative care physician     Time reflects when diagnosis was documented in both MDM as applicable and the Disposition within this note     Time User Action Codes Description Comment    10/26/2017  8:39 AM Ruma Marcello Add [C34 91] Adenocarcinoma of right lung ([facility] )     10/26/2017  8:39 AM Ruma Marcello Modify [C34 91] Adenocarcinoma of right lung ([facility] )     10/26/2017  8:39 AM Ruma Marcello Add [G89 3] Pain of metastatic malignancy     10/26/2017  8:39 AM Ruma Marcello Add [R00 0] Tachycardia     10/26/2017  8:40 AM Ruma Marcello Add [R06 82] Tachypnea     10/26/2017  2:11 PM Antoine Hollins Going D Add [C79 70] 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 ([facility] )     10/26/2017  2:11 PM Debra Shock D Modify [C79 70] 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 ([facility] )     10/26/2017  2:56 PM Antoine Sandhu Going D Add [C34 91] Adenocarcinoma of right lung, stage 4 ([facility] )     10/26/2017  2:56 PM Antoine Sandhu Going D Modify [C34 91] Adenocarcinoma of right lung, stage 4 ([facility] ) 10/26/2017  2:56 PM Melida WOOTEN Modify [C34 91] Adenocarcinoma of right lung, stage 4 ([facility] [address] )     10/26/2017  2:56 PM Melida WOOTEN Add [F11 20] Continuous opioid dependence ([facility] )     10/26/2017  2:56 PM Melida WOOTEN Modify [F11 20] Continuous opioid dependence ([facility] )     10/26/2017  2:56 PM Cheryl Sandhu Add [Z51 5] Under care of palliative care physician     10/26/2017  2:56 PM Cheryl Sandhu Modify [Z51 5] Under care of palliative care physician       ED Disposition     ED Disposition Condition Comment    Admit  Case was discussed with FM and the patient's admission status was agreed to be Admission Status: inpatient status to the service of Dr Hess Pac  Follow-up Information    None       Current Discharge Medication List      CONTINUE these medications which have NOT CHANGED    Details   fentaNYL (DURAGESIC) 50 mcg/hr Place 1 patch on the skin every third day      gabapentin (NEURONTIN) 100 mg capsule Take 100 mg by mouth 3 (three) times a day      oxyCODONE (OXY-IR) 5 MG capsule Take 10 mg by mouth every 4 (four) hours as needed for moderate pain             No discharge procedures on file  ED Provider  Attending physically available and evaluated Larissa Higuera I managed the patient along with the ED Attending      Electronically Signed by         Jose Francisco Og MD  Resident  10/26/17 5180

## 2017-10-26 NOTE — CONSULTS
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)    Nausea    Neuropathic pain syndrome (non-herpetic)    Elevated rheumatoid factor    Under care of palliative care physician    Abdominal pain    Back pain    Chest pain       Plan:  1  Symptom management - Cancer related pain, anxiety, Nausea/vomiting, insomnia, constipation     - Xanax 0 5 mg PO X 1 NOW   - D/C Fentanyl Patches   - Start Fentanyl drip 50 mcg/hr   - Oxy IR 15-20 mg PO Q 3 PRN   - Fentanyl 50 mcg IV Q 2 PRN breakthrough   - Phenergan 25 mg IV Q 6 scheduled   - Lorazepam 0 5 mg IV Q 4 PRN   - Gabapentin 400  Mg PO Q HS (home med)   - Mirtazapine 15 mg PO Q HS (home med)   - Melatonin 3 mg PO Q HS   - Bowel regimen as ordered    -Will defer use of steroids at this time until further imaging studies are completed and course of therapy decided (Pt may be a candidate for immunotherapy)    2  Goals - disease directed   - Stage IV adenocarcinoma of right lung   - Agreeable to further work up and disease directed therapies  Follows with Dr Lim Lobe Status: FULL - Level 1   Power of :  presumed to be wife by [place] Act 169   Advance Directive / Living Will: none   POLST:  none         We appreciate the invitation to be involved in this patient's care  We will follow  Please do not hesitate to reach our on call provider through our clinic answering service at  should you have acute symptom control concerns  Mimi Leigha  [address] [facility]  Palliative and Supportive Care  Pager: [phone]    IDENTIFICATION:  Consults  Physician Requesting Consult: Lucila Guardado MD  Reason for Consult / Principal Problem: pain,  Hx and PE limited by: acuity of condition    HISTORY OF PRESENT ILLNESS:       Liseth Cousin is a 42 y o  male who presents with severe intractable right upper thoraic pain and nausea  He has a PMH significant for metastatic adenocarcinoma of the right lung and follows with Dr Tobias Walter  CTA chest performed in ED reveals progression of his disease with new metastasis to his left adrenal and left apical lung and liver  PSC was consulted to help with pain and symptom management  He is well known to [facility] team and follows with Dr Gerard Peace for pain and symptom management  He is seen at bedside and his wife Neisha Barone is present  He is pacing in his hospital room unable to find a comfortable position  He is endorsing significant pain in his right thoracic region that radiates up over his scapular region and across his chest wall  The pain is sharp and constant, He is wearing Fentanyl 37 mcg patches  He has had 1 mg IV hydromorphone and 10 mg oxycodone without any relief of his symptoms  He is also having symptoms of intermittent  nausea and severe anxiety  He states he has been unable to sleep for days due to the severity of his pain    Review of Systems   Constitution: Positive for chills, diaphoresis, weakness, malaise/fatigue and weight loss  Negative for fever  HENT: Negative  Eyes: Negative  Cardiovascular: Positive for chest pain  Respiratory: Positive for cough and shortness of breath  Musculoskeletal: Positive for back pain and myalgias  Gastrointestinal: Positive for anorexia, constipation, heartburn, nausea and vomiting  Psychiatric/Behavioral: The patient is nervous/anxious          Past Medical History:   Diagnosis Date    Anxiety     GERD (gastroesophageal reflux disease)     Lung cancer ([facility] [address] )      Past Surgical History:   Procedure Laterality Date    PORTACATH PLACEMENT       Social History     Social History    Marital status: /Civil Union     Spouse name: N/A    Number of children: N/A    Years of education: N/A     Occupational History    Not on file  Social History Main Topics    Smoking status: Former Smoker     Quit date: 12/1/2015    Smokeless tobacco: Never Used    Alcohol use No    Drug use:      Types: Marijuana      Comment: daily smoker    Sexual activity: Not on file     Other Topics Concern    Not on file     Social History Narrative    No narrative on file     History reviewed  No pertinent family history  MEDICATIONS / ALLERGIES:    all current active meds have been reviewed    Allergies   Allergen Reactions    Zofran [Ondansetron] GI Intolerance    Nicotine Rash       OBJECTIVE:    Vitals:    10/26/17 1444   BP: 123/85   Pulse: 92   Resp: 18   Temp: 98 5 °F (36 9 °C)   SpO2: 92%       Physical Exam  Physical Exam   Constitutional: He is oriented to person, place, and time  He appears distressed  Alert, pleasant  Appears uncomfortable pacing his room unable to find a comfortable position   HENT:   Head: Normocephalic and atraumatic  Cardiovascular: Normal rate and regular rhythm  Pulmonary/Chest: Effort normal  No respiratory distress  He exhibits tenderness  Abdominal: Soft  Bowel sounds are normal  There is no tenderness  Musculoskeletal: He exhibits tenderness  He exhibits no edema  Thoracic back: He exhibits tenderness and pain  Neurological: He is alert and oriented to person, place, and time  Skin: He is diaphoretic  Psychiatric: His behavior is normal  Thought content normal  His mood appears anxious   Cognition and memory are normal        Lab Results:   CBC:   Lab Results   Component Value Date    WBC 9 86 10/26/2017    HGB 10 9 (L) 10/26/2017    HCT 33 3 (L) 10/26/2017    MCV 62 (L) 10/26/2017     (H) 10/26/2017    MCH 20 3 (L) 10/26/2017    MCHC 32 7 10/26/2017    RDW 17 1 (H) 10/26/2017    MPV 8 7 (L) 10/26/2017    NRBC 0 10/26/2017   , CMP:   Lab Results   Component Value Date     (L) 10/26/2017    K 4 2 10/26/2017    CL 99 (L) 10/26/2017    CO2 25 10/26/2017    ANIONGAP 9 10/26/2017    BUN 10 10/26/2017    CREATININE 0 91 10/26/2017    GLUCOSE 104 10/26/2017    CALCIUM 9 9 10/26/2017    AST 60 (H) 10/26/2017    ALT 25 10/26/2017    ALKPHOS 196 (H) 10/26/2017    PROT 9 1 (H) 10/26/2017    ALBUMIN 3 5 10/26/2017    BILITOT 0 38 10/26/2017    EGFR 122 10/26/2017     Imaging Studies: CTA chest reviewed "New very large right upper lobe mass, which compresses the hilum, and is inseparable from the mediastinum   Increased right middle lobe consolidation representing tumor versus post obstructive atelectasis   Right hilar adenopathy  New 5 cm left apical nodule  Worsening large right adrenal metastatic mass   New extensive hepatic metastatic disease   New left adrenal metastatic disease "      Counseling / Coordination of Care  Total floor / unit time spent today 45 minutes  Greater than 50% of total time was spent with the patient and / or family counseling and / or coordination of care   A description of the counseling / coordination of care: assessment of symptoms, discussion with Oncology

## 2017-10-26 NOTE — H&P
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 who reports to [facility] in [place] with multiple complaints  Patient sees Dr Ivett Ramon of Oncology as an outpatient  He was seen yesterday, and his oncologist had recommended him to come into the hospital for further evaluation, namely biopsy and imaging  Patient had initially declined, but last night, he was having shortness of breath as well as increasingly worsening back and chest pain  He also reports feeling nauseous with multiple episodes of vomiting, but no hematemesis  The pain was refractory to oxycodone and fentanyl  In addition, patient reports yellowing of his eyes over the last several months and recent weight loss  He has poor appetite and is unable to sleep  Also, he reports difficulty with urinating as well as constipation  No dysuria or hematuria  He came into the ED, where he underwent a chest PE study  CT scan showed worsening tumor burden, with increasing right sided lung mass, new 5 cm left lung nodule, worsening right adrenal mass, new left adrenal mass, and extensive liver metastasis  No imaging was obtained for the abdomen and pelvis  Per Dr Ivett aRmon, he would benefit from imaging of the abdomen and pelvis, as well as MRI of the spine to assess for bone metastases  Would also like for palliative care to be consulted for pain medication management  Review of Systems   Review of Systems   Constitutional: Positive for chills, diaphoresis, fatigue, fever and unexpected weight change     HENT: Negative  Eyes: Negative for pain and visual disturbance  Respiratory: Positive for shortness of breath  Negative for cough and wheezing  Cardiovascular: Positive for chest pain  Negative for palpitations  Gastrointestinal: Positive for abdominal pain, constipation, nausea and vomiting  Negative for blood in stool  Genitourinary: Positive for difficulty urinating  Negative for dysuria and hematuria  Musculoskeletal: Positive for arthralgias, back pain and myalgias  Skin: Negative for color change  Neurological: Negative for dizziness, syncope, light-headedness and headaches  Hematological: Negative for adenopathy  Psychiatric/Behavioral: Negative for confusion  Past Medical History     Past Medical History:   Diagnosis Date    Anxiety     GERD (gastroesophageal reflux disease)     Lung cancer ([facility] [address] )        Past Surgical History     Past Surgical History:   Procedure Laterality Date    PORTACATH PLACEMENT         Social History     History   Alcohol Use No     History   Drug Use    Types: Marijuana     Comment: daily smoker     History   Smoking Status    Former Smoker    Quit date: 12/1/2015   Smokeless Tobacco    Never Used       Family History   History reviewed  No pertinent family history  Medications Prior to Admission     Prior to Admission medications    Medication Sig Start Date End Date Taking? Authorizing Provider   fentaNYL (DURAGESIC) 50 mcg/hr Place 1 patch on the skin every third day   Yes Historical Provider, MD   gabapentin (NEURONTIN) 100 mg capsule Take 100 mg by mouth 3 (three) times a day   Yes Historical Provider, MD   oxyCODONE (OXY-IR) 5 MG capsule Take 10 mg by mouth every 4 (four) hours as needed for moderate pain     Yes Historical Provider, MD   folic acid (FOLVITE) 1 mg tablet Take 1 mg by mouth daily  10/26/17 Yes Historical Provider, MD   Multiple Vitamin (MULTIVITAMIN) tablet Take 1 tablet by mouth daily    10/26/17  Historical Provider, MD omeprazole (PriLOSEC) 10 mg delayed release capsule Take 10 mg by mouth daily  10/26/17  Historical Provider, MD   promethazine (PHENERGAN) 25 mg tablet Take 1 tablet by mouth every 6 (six) hours as needed for nausea or vomiting 7/22/17 10/26/17  [facility] [facility], EDIWNA   promethazine (PHENERGAN) 25 mg tablet Take 1 tablet by mouth every 6 (six) hours as needed for nausea or vomiting 10/1/17 10/26/17  Patricia Hwang,        Allergies     Allergies   Allergen Reactions    Zofran [Ondansetron] GI Intolerance    Nicotine Rash       Objective     Vitals:    10/26/17 0955 10/26/17 1141 10/26/17 1412 10/26/17 1444   BP: 123/88 127/86 141/91 123/85   Pulse: 97 96 94 92   Resp: 18 18 16 18   Temp:    98 5 °F (36 9 °C)   TempSrc:    Oral   SpO2: 98% 98% 98% 92%   Weight:       Height:         Body mass index is 22 25 kg/m²  Intake/Output Summary (Last 24 hours) at 10/26/17 1450  Last data filed at 10/26/17 1307   Gross per 24 hour   Intake             1000 ml   Output                0 ml   Net             1000 ml     Invasive Devices     Peripheral Intravenous Line            Peripheral IV 10/26/17 Right Antecubital less than 1 day                Physical Exam  GENERAL: Appears well-developed and well-nourished  appears very uncomfortable on exam   Diaphoretic   HEENT: Normocephalic and atraumatic  Scleral icterus  PERRLA  EOMI B/L  No oropharyngeal edema  MM moist    NECK: Neck supple with no lymphadenopathy  Trachea midline  No JVD  CARDIOVASCULAR: S1 and S2 are present  Regular rate and rhythm  No murmurs, rubs, or gallops  RESPIRATORY: CTA B/L, no rales, rhonci or wheezes  Normal respiratory expansion  ABDOMINAL: Abdomen exquisitely tender on right upper and lower quadrant  Left side nontender  Soft  No guarding  EXTREMITIES: 2+ DP and PT pulses bilaterally; no cyanosis, clubbing, edema  ROM intact  /GYN: Deferred  RECTAL: Deferred     MSK: Right back diffusely tender in cervical, thoracic, and upper lumbar regions  Left side nontender  Right chest wall tender to palpation  NEUROLOGIC: Patient is alert and oriented to person, place, and time  No sensory or motor deficits  CN 2-12 intact  Plantars downgoing bilaterally  Speech fluent  SKIN: Skin is warm and dry  No skin lesions are present  No rashes  PSYCHIATRIC: Anxious, worried     Lab Results: I have personally reviewed pertinent reports  CBC:     Results from last 7 days  Lab Units 10/26/17  0615   WBC Thousand/uL 9 86   RBC Million/uL 5 37   HEMOGLOBIN g/dL 10 9*   HEMATOCRIT % 33 3*   MCV fL 62*   MCH pg 20 3*   MCHC g/dL 32 7   RDW % 17 1*   MPV fL 8 7*   PLATELETS Thousands/uL 603*   NRBC AUTO /100 WBCs 0   LYMPHO PCT % 19   MONO PCT MAN % 5   EOSINO PCT MANUAL % 3   BASOS PCT MAN % 0   EOS ABS MAN Thousand/uL 0 30   , Chemistry Profile:     Results from last 7 days  Lab Units 10/26/17  0615   SODIUM mmol/L 133*   POTASSIUM mmol/L 4 2   CHLORIDE mmol/L 99*   CO2 mmol/L 25   ANION GAP mmol/L 9   BUN mg/dL 10   CREATININE mg/dL 0 91   GLUCOSE RANDOM mg/dL 104   CALCIUM mg/dL 9 9   AST U/L 60*   ALT U/L 25   ALK PHOS U/L 196*   TOTAL PROTEIN g/dL 9 1*   ALBUMIN g/dL 3 5   BILIRUBIN TOTAL mg/dL 0 38   EGFR ml/min/1 73sq m 122   , Coagulation Studies:     Results from last 7 days  Lab Units 10/26/17  0615   PROTIME seconds 15 0*   INR  1 17*   PTT seconds 29   , Cardiac Studies:     Results from last 7 days  Lab Units 10/26/17  0620   POC TROPONIN I  ng/ml 0 00   , Additional Labs:   , ABG:       Imaging: I have personally reviewed pertinent films in PACS  Xr Chest 2 Views    Result Date: 10/26/2017  Narrative: CHEST - DUAL ENERGY INDICATION:  Chest pain COMPARISON:  October 1, 2017 VIEWS:  PA (including soft tissue/bone algorithms) and lateral projections IMAGES:  4 FINDINGS:  Right-sided line is seen with tip in the right atrium There is a mass in the right lung which abuts the mediastinal border    This is unchanged from the previous study No new consolidation Visualized osseous structures appear within normal limits for the patient's age  Impression: No new consolidation seen No congestion seen Right lung mass, unchanged from the previous study of October 1, 2017 Workstation performed: [id number]     Xr Chest Pa & Lateral    Result Date: 10/2/2017  Narrative: CHEST - DUAL ENERGY INDICATION:  fever - cancer  History taken directly from the electronic ordering system  Shortness of breath, back pain COMPARISON:  7/21/2017 VIEWS:  PA (including soft tissue/bone algorithms) and lateral projections IMAGES:  4 FINDINGS:  Right chest wall port  Stable cardiac size  Since the prior chest radiograph, there has been significant increase in the large right perihilar mass  Mild interstitial and patchy densities bilaterally  No pneumothorax  Right apical pleural-parenchymal scarring  Visualized osseous structures appear within normal limits for the patient's age  Impression: Significant increase in a large right perihilar mass  Mild interstitial and patchy densities bilaterally  Contrast CT evaluation suggested  ##imslh##imslh Workstation performed: [id number]     Cta Ed Chest Pe Study    Result Date: 10/26/2017  Narrative: CTA - CHEST WITH IV CONTRAST - PULMONARY ANGIOGRAM INDICATION: Tachycardia, tachypnea, lung cancer  COMPARISON: Chest CT 12/28/2016  CT abdomen 7/21/2017 TECHNIQUE: CTA examination of the chest was performed using angiographic technique according to a protocol specifically tailored to evaluate for pulmonary embolism  Reformatted images were created in axial, sagittal, and coronal planes  In addition, coronal 3D MIP postprocessing was performed on the acquisition scanner  Radiation dose length product (DLP) for this visit:  381 84 mGy-cm     This examination, like all CT scans performed in the [facility], was performed utilizing techniques to minimize radiation dose exposure, including the use of iterative  reconstruction and automated exposure control  IV Contrast:  85 mL of iohexol (OMNIPAQUE)      FINDINGS: PULMONARY ARTERIAL TREE:  No pulmonary embolus is seen  LUNGS:  There is a new, very large mass occupying the majority of the anteromedial right upper lobe, measuring approximately 9 0 x 14 5 x 14 6 cm in greatest AP, transverse and longitudinal dimensions  This mass is inseparable from the mediastinum, and compresses the hilum  There is masslike consolidation in the right middle lobe, which may tumor versus post obstructive atelectasis  Pre-existing calcified masses in the posterior aspect of the right upper lobe appear unchanged  There is a new 5 mm left apical nodule on image 4/12  Bilateral upper lobe emphysema again identified  PLEURA:  Unremarkable  HEART/AORTA:  Unremarkable for patient's age  MEDIASTINUM AND ANDREW:  Right hilar adenopathy  CHEST WALL AND LOWER NECK:       Normal  VISUALIZED STRUCTURES IN THE UPPER ABDOMEN:  There are multiple new ill-defined hypodense masses compatible with metastatic disease throughout the visualized liver, largest discretely measurable lesion measuring approximately 4 8 x 3 4 cm at the posterior margin of the right hepatic lobe  Partially imaged right adrenal mass appears increased, measuring approximately 11 0 x 7 5 cm --- previously 10 5 x 6 0 cm  Increased nodular morphology of the left adrenal gland suspicious for metastatic disease  OSSEOUS STRUCTURES:  No acute fracture or destructive osseous lesion  Impression: No pulmonary embolism  New very large right upper lobe mass, which compresses the hilum, and is inseparable from the mediastinum  Increased right middle lobe consolidation representing tumor versus post obstructive atelectasis  Right hilar adenopathy  New 5 cm left apical nodule  Worsening large right adrenal metastatic mass  New extensive hepatic metastatic disease  New left adrenal metastatic disease   Workstation performed: [id number]       Microbiology: cultures obtained in emergency department include none    Urinalysis:     Results from last 7 days  Lab Units 10/26/17  1023   COLOR UA  Yellow   CLARITY UA  Clear   SPEC GRAV UA  1 015   PH UA  7 0   LEUKOCYTES UA  Negative   NITRITE UA  Negative   PROTEIN UA mg/dl 30 (1+)*   GLUCOSE UA mg/dl Negative   KETONES UA mg/dl Negative   BILIRUBIN UA  Negative   BLOOD UA  Trace*        Urine Micro:     Results from last 7 days  Lab Units 10/26/17  1023   RBC UA /hpf 2-4*   WBC UA /hpf None Seen   EPITHELIAL CELLS WET PREP /hpf None Seen   BACTERIA UA /hpf None Seen        EKG, Pathology, and Other Studies: I have personally reviewed pertinent reports        Medications given in Emergency Department     Medication Administration - last 24 hours from 10/25/2017 1450 to 10/26/2017 1450       Date/Time Order Dose Route Action Action by     10/26/2017 0613 metoclopramide (REGLAN) injection 10 mg 10 mg Intravenous Given Derrick Manrique RN     10/26/2017 0920 oxyCODONE (ROXICODONE) immediate release tablet 10 mg 10 mg Oral Given Yennifer Ventura     10/26/2017 0756 oxyCODONE (ROXICODONE) immediate release tablet 10 mg 0 mg Oral [address] Misael Saeed RN     10/26/2017 0747 promethazine (PHENERGAN) injection 25 mg 25 mg Intravenous Given Yennifer Ventura     10/26/2017 0752 HYDROmorphone (DILAUDID) 1 mg/mL injection 1 mg 1 mg Intravenous Given Yennifer Ventura     10/26/2017 1307 sodium chloride 0 9 % bolus 1,000 mL 0 mL Intravenous Stopped Efrain Casarez RN     10/26/2017 0752 sodium chloride 0 9 % bolus 1,000 mL 1,000 mL Intravenous New Bag Yennifer Ventura     10/26/2017 0948 iohexol (OMNIPAQUE) 350 MG/ML injection (MULTI-DOSE) 85 mL 85 mL Intravenous Given Radha Carreno     10/26/2017 1346 promethazine (PHENERGAN) injection 25 mg 25 mg Intramuscular Given Yennifer Ventura     10/26/2017 1346 oxyCODONE (ROXICODONE) IR tablet 5 mg 5 mg Oral Given Yennifer Ventura     10/26/2017 1444 oxyCODONE (ROXICODONE) IR tablet 5 mg 5 mg Oral Given April Williamson RN          Assessment and Plan     Principal Problem:    Adenocarcinoma of right lung, stage 4 ([facility] [address] )  Active Problems:    Anxiety    Pain of metastatic malignancy    Continuous opioid dependence ([facility] [address] )    Depression    Insomnia    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)    Nausea    Neuropathic pain syndrome (non-herpetic)    Under care of palliative care physician    Abdominal pain    Back pain    Chest pain    Stage IV adenocarcinoma of lung  Patient diagnosed approximately 4 years ago  Has received chemotherapy and immunotherapy as an outpatient  No treatment this year  Sees Dr Debbie Banda as an outpatient  CT PE study showed worsening tumor burden  · Follow-up Oncology and palliative Care consult  · Will obtain MRI of the brain and spine to assess for metastatic disease  · Monitor blood counts and serum electrolytes    Adrenal gland/liver metastases  · Patient has extensive tumor burden  Follow-up repeat imaging to better assess tumor burden  Pain of metastatic malignancy with continuous opioid dependence  Patient is on a 50 mcg fentanyl patch and oxycodone as an outpatient  However, he reports that these are not helping his pain currently  He normally sees palliative care as an outpatient  · He reports pain in his back, right shoulder, right chest around the site of his port, and his abdomen  · We will continue oxycodone p r n  and fentanyl, as well as Tylenol  · Follow-up palliative care recommendations  · Dilaudid for breakthrough pain    Apneic episodes  Received word from consulting service that the patient was also reporting Sonda Catena at night, in which he felt like he had stopped breathing and had to wake up  · Will monitor overnight pulse oximetry  Nausea/vomiting  · Patient states he has had a bad reaction to Zofran in the past  · Continue Phenergan p r n      Anxiety/Depression  · Patient very anxious, most likely secondary to his situation  Will wait for palliative care consult  Not on any medications as an outpatient  Insomnia  Likely multifactorial from pain, nausea, and anxiety  Patient states he has tried Ambien before with no benefit  · Continue treatment for medical issues as outlined above  · Consider Benadryl for sedation  Code Status: Level 1 - Full Code  VTE Pharmacologic Prophylaxis: Enoxaparin (Lovenox)   VTE Mechanical Prophylaxis: sequential compression device  Admission Status: INPATIENT     Admission Time  I spent 1 hour admitting the patient  This involved direct patient contact where I performed a full history and physical, reviewing previous records, and reviewing laboratory and other diagnostic studies      [employer], DO  Internal Medicine  PGY-2

## 2017-10-27 ENCOUNTER — APPOINTMENT (INPATIENT)
Dept: RADIOLOGY | Facility: HOSPITAL | Age: 37
DRG: 136 | End: 2017-10-27
Payer: COMMERCIAL

## 2017-10-27 LAB
ALBUMIN SERPL BCP-MCNC: 2.7 G/DL (ref 3.5–5)
ALP SERPL-CCNC: 166 U/L (ref 46–116)
ALT SERPL W P-5'-P-CCNC: 22 U/L (ref 12–78)
ANION GAP SERPL CALCULATED.3IONS-SCNC: 7 MMOL/L (ref 4–13)
AST SERPL W P-5'-P-CCNC: 45 U/L (ref 5–45)
BILIRUB SERPL-MCNC: 0.41 MG/DL (ref 0.2–1)
BUN SERPL-MCNC: 11 MG/DL (ref 5–25)
CALCIUM SERPL-MCNC: 9.2 MG/DL (ref 8.3–10.1)
CEA SERPL-MCNC: 1.6 NG/ML (ref 0–3)
CHLORIDE SERPL-SCNC: 101 MMOL/L (ref 100–108)
CO2 SERPL-SCNC: 25 MMOL/L (ref 21–32)
CREAT SERPL-MCNC: 0.67 MG/DL (ref 0.6–1.3)
ERYTHROCYTE [DISTWIDTH] IN BLOOD BY AUTOMATED COUNT: 17.1 % (ref 11.6–15.1)
FERRITIN SERPL-MCNC: 174 NG/ML (ref 8–388)
GFR SERPL CREATININE-BSD FRML MDRD: 140 ML/MIN/1.73SQ M
GLUCOSE SERPL-MCNC: 91 MG/DL (ref 65–140)
HCT VFR BLD AUTO: 29.4 % (ref 36.5–49.3)
HGB BLD-MCNC: 9.3 G/DL (ref 12–17)
IRON SATN MFR SERPL: 6 %
IRON SERPL-MCNC: 16 UG/DL (ref 65–175)
MCH RBC QN AUTO: 19.5 PG (ref 26.8–34.3)
MCHC RBC AUTO-ENTMCNC: 31.6 G/DL (ref 31.4–37.4)
MCV RBC AUTO: 62 FL (ref 82–98)
PLATELET # BLD AUTO: 518 THOUSANDS/UL (ref 149–390)
PMV BLD AUTO: 8.6 FL (ref 8.9–12.7)
POTASSIUM SERPL-SCNC: 4.3 MMOL/L (ref 3.5–5.3)
PROT SERPL-MCNC: 7.6 G/DL (ref 6.4–8.2)
RBC # BLD AUTO: 4.77 MILLION/UL (ref 3.88–5.62)
SODIUM SERPL-SCNC: 133 MMOL/L (ref 136–145)
TIBC SERPL-MCNC: 272 UG/DL (ref 250–450)
WBC # BLD AUTO: 7.6 THOUSAND/UL (ref 4.31–10.16)

## 2017-10-27 PROCEDURE — A9503 TC99M MEDRONATE: HCPCS

## 2017-10-27 PROCEDURE — 82378 CARCINOEMBRYONIC ANTIGEN: CPT | Performed by: INTERNAL MEDICINE

## 2017-10-27 PROCEDURE — 83550 IRON BINDING TEST: CPT | Performed by: INTERNAL MEDICINE

## 2017-10-27 PROCEDURE — 85027 COMPLETE CBC AUTOMATED: CPT | Performed by: INTERNAL MEDICINE

## 2017-10-27 PROCEDURE — 83540 ASSAY OF IRON: CPT | Performed by: INTERNAL MEDICINE

## 2017-10-27 PROCEDURE — 80053 COMPREHEN METABOLIC PANEL: CPT | Performed by: INTERNAL MEDICINE

## 2017-10-27 PROCEDURE — 78306 BONE IMAGING WHOLE BODY: CPT

## 2017-10-27 PROCEDURE — 82728 ASSAY OF FERRITIN: CPT | Performed by: INTERNAL MEDICINE

## 2017-10-27 PROCEDURE — 94762 N-INVAS EAR/PLS OXIMTRY CONT: CPT

## 2017-10-27 RX ORDER — PROCHLORPERAZINE MALEATE 10 MG
10 TABLET ORAL EVERY 6 HOURS PRN
Status: DISCONTINUED | OUTPATIENT
Start: 2017-10-27 | End: 2017-11-06 | Stop reason: HOSPADM

## 2017-10-27 RX ORDER — LANOLIN ALCOHOL/MO/W.PET/CERES
CREAM (GRAM) TOPICAL AS NEEDED
Status: DISCONTINUED | OUTPATIENT
Start: 2017-10-27 | End: 2017-11-06 | Stop reason: HOSPADM

## 2017-10-27 RX ORDER — DIPHENHYDRAMINE HCL 25 MG
25 TABLET ORAL ONCE
Status: COMPLETED | OUTPATIENT
Start: 2017-10-27 | End: 2017-10-27

## 2017-10-27 RX ORDER — DIPHENHYDRAMINE HYDROCHLORIDE 50 MG/ML
12.5 INJECTION INTRAMUSCULAR; INTRAVENOUS ONCE
Status: COMPLETED | OUTPATIENT
Start: 2017-10-27 | End: 2017-10-27

## 2017-10-27 RX ORDER — DOCUSATE SODIUM 100 MG/1
100 CAPSULE, LIQUID FILLED ORAL 2 TIMES DAILY
Status: DISCONTINUED | OUTPATIENT
Start: 2017-10-27 | End: 2017-11-05

## 2017-10-27 RX ORDER — DIPHENHYDRAMINE HCL 25 MG
25 TABLET ORAL EVERY 8 HOURS PRN
Status: DISCONTINUED | OUTPATIENT
Start: 2017-10-27 | End: 2017-11-05

## 2017-10-27 RX ORDER — ALPRAZOLAM 0.5 MG/1
0.5 TABLET ORAL 3 TIMES DAILY
Status: DISCONTINUED | OUTPATIENT
Start: 2017-10-27 | End: 2017-10-29

## 2017-10-27 RX ORDER — PANTOPRAZOLE SODIUM 40 MG/1
40 TABLET, DELAYED RELEASE ORAL
Status: DISCONTINUED | OUTPATIENT
Start: 2017-10-27 | End: 2017-11-04

## 2017-10-27 RX ORDER — POLYETHYLENE GLYCOL 3350 17 G/17G
17 POWDER, FOR SOLUTION ORAL DAILY
Status: DISCONTINUED | OUTPATIENT
Start: 2017-10-27 | End: 2017-10-27

## 2017-10-27 RX ORDER — ALPRAZOLAM 0.5 MG/1
0.5 TABLET ORAL 3 TIMES DAILY
Status: DISCONTINUED | OUTPATIENT
Start: 2017-10-27 | End: 2017-10-27

## 2017-10-27 RX ADMIN — DIPHENHYDRAMINE HCL 25 MG: 25 TABLET ORAL at 06:28

## 2017-10-27 RX ADMIN — PANTOPRAZOLE SODIUM 40 MG: 40 TABLET, DELAYED RELEASE ORAL at 12:41

## 2017-10-27 RX ADMIN — DIPHENHYDRAMINE HCL 25 MG: 25 TABLET ORAL at 22:29

## 2017-10-27 RX ADMIN — SODIUM CHLORIDE 75 ML/HR: 0.9 INJECTION, SOLUTION INTRAVENOUS at 20:42

## 2017-10-27 RX ADMIN — SENNOSIDES 17.2 MG: 8.6 TABLET, FILM COATED ORAL at 08:06

## 2017-10-27 RX ADMIN — Medication 500 MG: at 18:49

## 2017-10-27 RX ADMIN — GABAPENTIN 400 MG: 400 CAPSULE ORAL at 22:31

## 2017-10-27 RX ADMIN — OXYCODONE HYDROCHLORIDE 20 MG: 10 TABLET ORAL at 04:21

## 2017-10-27 RX ADMIN — ACETAMINOPHEN 975 MG: 325 TABLET, FILM COATED ORAL at 22:52

## 2017-10-27 RX ADMIN — MELATONIN TAB 3 MG 3 MG: 3 TAB at 22:31

## 2017-10-27 RX ADMIN — OXYCODONE HYDROCHLORIDE 20 MG: 10 TABLET ORAL at 18:40

## 2017-10-27 RX ADMIN — ENOXAPARIN SODIUM 40 MG: 40 INJECTION SUBCUTANEOUS at 08:05

## 2017-10-27 RX ADMIN — ACETAMINOPHEN 975 MG: 325 TABLET, FILM COATED ORAL at 06:16

## 2017-10-27 RX ADMIN — OXYCODONE HYDROCHLORIDE 20 MG: 10 TABLET ORAL at 22:29

## 2017-10-27 RX ADMIN — DIPHENHYDRAMINE HYDROCHLORIDE 12.5 MG: 50 INJECTION, SOLUTION INTRAMUSCULAR; INTRAVENOUS at 12:41

## 2017-10-27 RX ADMIN — DOCUSATE SODIUM 100 MG: 100 CAPSULE, LIQUID FILLED ORAL at 08:05

## 2017-10-27 RX ADMIN — SODIUM CHLORIDE 75 ML/HR: 0.9 INJECTION, SOLUTION INTRAVENOUS at 06:20

## 2017-10-27 RX ADMIN — POLYETHYLENE GLYCOL 3350 17 G: 17 POWDER, FOR SOLUTION ORAL at 08:05

## 2017-10-27 RX ADMIN — OXYCODONE HYDROCHLORIDE 20 MG: 10 TABLET ORAL at 08:05

## 2017-10-27 RX ADMIN — DOCUSATE SODIUM 100 MG: 100 CAPSULE, LIQUID FILLED ORAL at 18:40

## 2017-10-27 RX ADMIN — ALPRAZOLAM 0.5 MG: 0.5 TABLET ORAL at 20:37

## 2017-10-27 RX ADMIN — FENTANYL CITRATE 50 MCG: 50 INJECTION INTRAMUSCULAR; INTRAVENOUS at 11:55

## 2017-10-27 RX ADMIN — OXYCODONE HYDROCHLORIDE 20 MG: 10 TABLET ORAL at 12:46

## 2017-10-27 RX ADMIN — ALPRAZOLAM 0.5 MG: 0.5 TABLET ORAL at 12:41

## 2017-10-27 RX ADMIN — MIRTAZAPINE 15 MG: 15 TABLET, FILM COATED ORAL at 22:31

## 2017-10-27 RX ADMIN — OXYCODONE HYDROCHLORIDE 20 MG: 10 TABLET ORAL at 15:48

## 2017-10-27 RX ADMIN — PROMETHAZINE HYDROCHLORIDE 25 MG: 25 INJECTION INTRAMUSCULAR; INTRAVENOUS at 11:57

## 2017-10-27 RX ADMIN — ACETAMINOPHEN 975 MG: 325 TABLET, FILM COATED ORAL at 15:48

## 2017-10-27 RX ADMIN — LORAZEPAM 0.5 MG: 2 INJECTION INTRAMUSCULAR; INTRAVENOUS at 08:29

## 2017-10-27 NOTE — PROGRESS NOTES
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)    Nausea    Neuropathic pain syndrome (non-herpetic)    Elevated rheumatoid factor    Under care of palliative care physician    Abdominal pain    Back pain    Chest pain       Plan:  1  Symptom management - Intractable cancer related pain, anxiety, nausea, sleep disturbance, Hives, xeroderma    - Unclear as to cause of hives: Pt had placed a nicotine patch on of his own record which has been removed  D/C Phenergan and Lorazepam these are newer medications that he has not had prior to hospitalization    - Benadryl 12 5 mg IV X 1 NOW   - Increase fentanyl drip to 75 mcg/hr   - Oxy 15-20 PO Q 3 PRN   - Fentanyl 50 mcg IV Q 2 PRN breakthrough   - Xanax 0 5 mg PO TID   - Gabapentin 400 mg PO Q HS   - Mirtazapine 15 mg PO Q HS   - Trial of Compazine 10 mg PO Q 6 PRN   - Eucerin cream to skin as needed    - Bowel regimen as ordered    2  Goals - disease directed   - Stage IV adenocarcinoma of right lung   - Agreeable to further work up and disease directed therapies     Code Status: FULL - Level 1   Power of :  presumed to be [place] 169   Advance Directive / Living Will: none   POLST:  none      Interval history:       Pt seen at bedside  He appears anxious  He reports having a decent night sleep, better than he has had in days but still woke up about every 3 hours during the night in pain  He is endorsing significant pain presently and states the oxycodone wears off in 3 hours  He has hive like wheels over his back and some areas of his arms and lower abdomen  These are itchy   He did admit to placing a nicotine patch on his left thigh that was not ordered by any medical provider and wonders if this has caused an allergic reaction? His skin is very dry  MEDICATIONS / ALLERGIES:    all current active meds have been reviewed    Allergies   Allergen Reactions    Zofran [Ondansetron] GI Intolerance       OBJECTIVE:    Physical Exam  Physical Exam   Constitutional: He appears distressed  Alert, appears anxious and uncomfortable    HENT:   Head: Normocephalic and atraumatic  Eyes: Pupils are equal, round, and reactive to light  No scleral icterus  Neck: Normal range of motion  Cardiovascular: Normal rate and regular rhythm  Pulmonary/Chest: Effort normal and breath sounds normal  No respiratory distress  He exhibits tenderness (right sided CW tenderness)  Abdominal: Soft  Bowel sounds are normal    Musculoskeletal: Normal range of motion  He exhibits no edema  Thoracic back: He exhibits tenderness  Skin: Skin is warm and dry  Rash noted  He is not diaphoretic  Hives present over back, several wheels noted bilateral lower arms, abdomen  Skin is very dry   Psychiatric: His speech is normal and behavior is normal  Thought content normal  His mood appears anxious         Lab Results:   CBC:   Lab Results   Component Value Date    WBC 7 60 10/27/2017    HGB 9 3 (L) 10/27/2017    HCT 29 4 (L) 10/27/2017    MCV 62 (L) 10/27/2017     (H) 10/27/2017    MCH 19 5 (L) 10/27/2017    MCHC 31 6 10/27/2017    RDW 17 1 (H) 10/27/2017    MPV 8 6 (L) 10/27/2017   , CMP:   Lab Results   Component Value Date     (L) 10/27/2017    K 4 3 10/27/2017     10/27/2017    CO2 25 10/27/2017    ANIONGAP 7 10/27/2017    BUN 11 10/27/2017    CREATININE 0 67 10/27/2017    GLUCOSE 91 10/27/2017    CALCIUM 9 2 10/27/2017    AST 45 10/27/2017    ALT 22 10/27/2017    ALKPHOS 166 (H) 10/27/2017    PROT 7 6 10/27/2017    ALBUMIN 2 7 (L) 10/27/2017    BILITOT 0 41 10/27/2017    EGFR 140 10/27/2017 Imaging Studies: scheduled for NM bone scan today  EKG, Pathology, and Other Studies:    Counseling / Coordination of Care  Total floor / unit time spent today 40 minutes  Greater than 50% of total time was spent with the patient and / or family counseling and / or coordination of care   A description of the counseling / coordination of care: bedside assessment of symptoms and repeat visit to evaluate pt's pain

## 2017-10-27 NOTE — DISCHARGE SUMMARY
[facility] Discharge Summary - Medical Donna Torres 42 y o  male MRN: [id number]    [address]  Room / Bed: [facility] 623/[facility] 700-93 Encounter: [id number]    BRIEF OVERVIEW    Admitting Provider: Preston Baltazar MD  Discharge Provider: No att  providers found  Primary Care Physician at Discharge:  [address]    Discharge To: Home    Admission Date: 10/26/2017     Discharge Date: 11/6/2017 12:16 PM    Primary Discharge Diagnosis  Principal Problem:    Adenocarcinoma of right lung, stage 4 (HCC)  Active Problems:    Anxiety    Pain of metastatic malignancy    Continuous opioid dependence ([facility] [address] )    Depression    Insomnia    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)    Nausea    Neuropathic pain syndrome (non-herpetic)    Under care of palliative care physician    Abdominal pain    Back pain    Chest pain    Bright red blood per rectum  Resolved Problems:    * No resolved hospital problems  *      Other Problems Addressed:  None    Consulting Providers   Palliative care  Pastoral care      Therapeutic Operative Procedures Performed  None    Diagnostic Procedures Performed   IR guided liver biopsy - pathological specimen pending at the time of discharge  Urinalysis within normal limits 10/26  Low iron 16 10/27  CEA 1 6 10/27    Discharge Disposition: Home/Self Care  Discharged With Lines: no    Test Results Pending at Discharge: liver biopsy specimen pending    Outpatient Follow-Up  yes      Follow up:  [address]  Date and time:  Please call to make appointment 1-2 weeks  Location:[address] [place] [address] Anna Childs, [place], [address] [place] [address]  [phone]  Follow up within next:  As above      yes      Follow up:  Robles Villa, DO   Date and time:  Please call Dr Bradley Orantes when he would like to see you in the outpatient setting   Location: [address] [place] [address]  Follow up within next:  As above    Follow up with consulting providers  yes      Physician name: Robert Javed MD   Specialty: Hospice and Palliative Medicine  Office phone number: [phone]  Follow up within next: 1-2 weeks     Active Issues Requiring Follow-up   none    Code Status: Prior    Medications   Discharge Medication List as of 11/6/2017 11:17 AM      START taking these medications    Details   !! dexamethasone (DECADRON) 4 mg tablet Take 1 tab (4mg) with breakfast and 1/2 tab (2mg) with lunch PO - take with food, Print      !! dexamethasone (DECADRON) 4 mg tablet Take 1 tablet by mouth daily with breakfast for 30 days, Starting Mon 11/6/2017, Until Wed 12/6/2017, Print      diazepam (VALIUM) 5 mg tablet Take 1 tablet by mouth every 8 (eight) hours, Starting Fri 11/3/2017, Print      fentaNYL ([address]) 75 mcg/hr Place 1 patch on the skin every third day Max Daily Amount: 1 patch, Starting Mon 11/6/2017, Print      Melatonin 3 MG CAPS Take 3 mg by mouth daily at bedtime for 30 days, Starting Mon 11/6/2017, Until Wed 12/6/2017, Print      oxyCODONE (ROXICODONE) 20 MG TABS Take 1 tablet by mouth every 3 (three) hours as needed for severe pain Max Daily Amount: 160 mg, Starting Fri 11/3/2017, Print      polyethylene glycol (MIRALAX) 17 g packet Take 17 g by mouth daily as needed (bowel regimen secondary to pain medication regimen of opiates), Starting Mon 11/6/2017, Print       !! - Potential duplicate medications found  Please discuss with provider          Discharge Medication List as of 11/6/2017 11:17 AM        Discharge Medication List as of 11/6/2017 11:17 AM      STOP taking these medications       fentaNYL (DURAGESIC) 50 mcg/hr Comments:   Reason for Stopping:         oxyCODONE (OXY-IR) 5 MG capsule Comments:   Reason for Stopping:         docusate sodium (COLACE) 100 mg capsule Comments:   Reason for Stopping:               Allergies  Allergies   Allergen Reactions    Zofran [Ondansetron] GI Intolerance     Discharge Diet: regular diet  Activity restrictions: As tolerated  [facility] [facility] [place] [facility]    Patient is a 77-year-old male with history of stage IV adenocarcinoma of the right lung who presents for further management of his malignancy  Patient sees Dr Alissa Davis of Oncology as an outpatient  One day prior to admission patient was having dyspnea and increased work of breathing as well as worsening chest pain, as well as right-sided abdominal and arm pain which was refractory to his home analgesic regimen  Patient also reports yellowing of his eyes and recent weight loss  Patient was admitted for pain control and further workup of his malignancy  Patient has enlarging right lung mass with metastases to the liver and adrenals  Surgical pathology from 2015 of the right upper lobe reveals TTF-1 positive with CK 7 positive P 40 negative  Patient will have CT chest abdomen pelvis, bone scan and MRI brain spine to assess for new metastasis  Patient was seen by palliative care for further management of his pain  His home fentanyl patch was discontinued he was started on a fentanyl drip 50 micrograms/hour as well as oxycodone IR 15-20 mg p o  q 3 p r n  with fentanyl 50 mcg IV Q 2 p r n  for breakthrough pain  Patient reports not tolerating Zofran in the past and was started on Phenergan 25 mg IV q 6 scheduled for nausea  In addition patient reported anxiety and trouble sleeping  He was started on lorazepam 0 5 mg IV Q 4 p r n  as well as melatonin 3 mg p o  at nighttime  Patient was seen by palliative medicine who adjusted pain regime according to patients needs, including adding standing valium for anxiety and and switching fentanyl drip to fentanyl patch and increasing oxycodone dose  Patient had CT-guided liver biopsy for further staging  Patient' had a few episodes of bright red blood per rectum which as per patient are typical for him occasionally likely secondary to hemorrhoids    In the past has bright red blood per rectum has been worked up as per him and has been negative for any findings  His hemoglobin remained stable after the 1st episode  Patient had a total of 2 episodes of bright red blood per rectum which self-resolved 1 day prior to discharge  Patient had a bowel regimen consisting of senna and MiraLax plus minus Colace with good bowel movements  Patient's pain regimen was optimized prior to discharge as per palliative  Patient had some difficulty obtaining pain medications in the outpatient setting as his insurance did not cover them which extended his hospital stay  Palliative care had to a peer with his insurance and the patient was approved for pain medication  The patient was medically stable at the time of discharge with close follow-up in 1-2 weeks with palliative care, primary care physician at [address] [place] [address] and patient's oncologist  The patient was scheduled for a lumbar spine MRI in the outpatient setting  Patient agreed with the plan for discharge on 11/06  Presenting Problem/History of Present Illness  Principal Problem:    Adenocarcinoma of right lung, stage 4 (HCC)  Active Problems:    Anxiety    Pain of metastatic malignancy    Continuous opioid dependence ([address] )    Depression    Insomnia    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)    Nausea    Neuropathic pain syndrome (non-herpetic)    Under care of palliative care physician    Abdominal pain    Back pain    Chest pain    Bright red blood per rectum  Resolved Problems:    * No resolved hospital problems  *    Bright red blood per rectum/anemia - patient reports no futher rectal bleeding   This is a chronic issues for him when he gets constipated   Hgb is stable  Patient did not have any more bloody bowel movements for 1 and half days prior to discharge  - no further work up needed  - regular diet  - bowel regimen - senna and MiraLax to prevent constipation    Add Colace as needed      Stage IV adenocarcinoma of lung  Patient diagnosed approximately 4 years ago  Shruthi Kurtz received chemotherapy and immunotherapy as an outpatient   No treatment this year   Sees Dr Carlos Alberto Paul as an outpatient   CT PE study showed worsening tumor burden   MRI of brain and C spine overnight showed no mets   MRI for L spine to be done outpatient  ·   Monitor blood counts and serum electrolytes  ·   Liver bx with IR on 11/03  Follow up with liver biopsy the outpatient setting      Adrenal gland/liver metastases  ·   Patient has extensive tumor burden   Follow-up repeat imaging to better assess tumor burden  MRI L spine to be done in the outpatient     Pain of metastatic malignancy with continuous opioid dependence  Patient reports improvement with this pain although he still is not adequately controlled  - fentanyl patch 75mcg/hr              - dexamethasone 4mg PO breakfast              - diazepam 5mg PO morning and afternoon, 10mg PO bedtime - scheduled              - dronabinol 2 5mg BID before lunch and dinner - to be stopped at d/c              - gabapentin 400mg q HS              - mirtazapine 15mg q HS              - melatonin 3mg q HS              - senna 2 tab BID, Miralax 17g daily  Add Colace as needed       Nausea/vomiting  ·   Continue Phenergan p r n  ·   Allergic to Zofran           Anxiety/Depression     ·   Begin Valium 5 mg p  o  morning and afternoon, 10 mg p o  bedtime scheduled       Insomnia  Likely multifactorial from pain, nausea, and anxiety   Patient states he has tried Ambien before with no benefit  ·   Continue treatment for medical issues as outlined above  - continue with melatonin 3 mg p o  Q h s  Other Pertinent Test Results  None    Discharge Condition: stable    Discharge  Statement   I spent 30 minutes minutes discharging the patient  This time was spent on the day of discharge  I had direct contact with the patient on the day of discharge

## 2017-10-27 NOTE — PROGRESS NOTES
10/26/17 1055   Clinical Encounter Type   Visited With Patient and family together   Routine Visit Follow-up   Continue Visiting Yes   Crisis Visit Critical Care   Referral From    Referral To    Faith Encounters   Faith Needs Prayer   Patient Spiritual Encounters   Spiritual Assessment 5   Suffering Severity 1   Fear Level 2   Coping 3   Social Interaction 100% of the time   Spiritual Encounter Notes walking through this difficult diagnosis with wife Robel Agrawal, offering support and contact information   Family Spiritual Encounters   Family Coping Open/discussion   Family Normalization 3   Family Participation in Care 5   Family Support During Treatment 5   Caregiver-Patient Relationship 5

## 2017-10-27 NOTE — PROGRESS NOTES
IM Residency Progress Note   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)    Nausea    Neuropathic pain syndrome (non-herpetic)    Under care of palliative care physician    Abdominal pain    Back pain    Chest pain    Stage IV adenocarcinoma of lung  Patient diagnosed approximately 4 years ago  Has received chemotherapy and immunotherapy as an outpatient  No treatment this year  Sees Dr Barton [facility] as an outpatient  CT PE study showed worsening tumor burden  · Complete metastatic workup with MRI  · Will obtain MRI of the brain and spine to assess for metastatic disease  · Monitor blood counts and serum electrolytes     Adrenal gland/liver metastases  · Patient has extensive tumor burden  Follow-up repeat imaging to better assess tumor burden      Pain of metastatic malignancy with continuous opioid dependence  Patient is on a 50 mcg fentanyl patch and oxycodone as an outpatient  However, he reports that these are not helping his pain currently  He normally sees palliative care as an outpatient  · Discontinue fentanyl patch start fentanyl drip 50 micrograms/hour  · Oxycodone IR 15-20 mg p o  q 3 p r n  · Gabapentin 400mg PO Q HS  · Fentanyl 50 mcg IV Q2 PRN breakthrough      Apneic episodes  Received word from consulting service that the patient was also reporting Jenniferarsenio Downing at night, in which he felt like he had stopped breathing and had to wake up    · Will monitor overnight pulse oximetry      Nausea/vomiting  · Patient states he has had a bad reaction to Zofran in the past  · Continue Phenergan p r n      Anxiety/Depression  · Patient reports anxiety this is improved with his new medications  · Continue lorazepam 0 5 mg IV Q for p r n      Insomnia  Likely multifactorial from pain, nausea, and anxiety  Patient states he has tried Ambien before with no benefit  · Continue treatment for medical issues as outlined above  · Add melatonin 3 mg p o  q h s  Disposition:  Continue inpatient care for MRI today      Subjective:     Patient continues to report pain although reports pain is much more controlled on current pain regimen he says pain is now 6/10 incentive his baseline 10/10  Wife reports that overnight patient was requiring his p r n  oxycodone 1 hour early but had to wait in pain  Patient reports improvement with his right arm pain and his pleuritic chest pain  He continues to report right sided chest wall and abdominal pain which is manageable but unchanged  He has no cough or hemoptysis  Appetite is good  Vitals: Temp (24hrs), Av 4 °F (36 9 °C), Min:98 1 °F (36 7 °C), Max:98 9 °F (37 2 °C)  Current: Temperature: 98 2 °F (36 8 °C)  Vitals:    10/27/17 0300 10/27/17 0658 10/27/17 0740 10/27/17 0805   BP: 126/74 111/71     Pulse: 96 93 88    Resp: 18 18 18    Temp: 98 1 °F (36 7 °C) 98 2 °F (36 8 °C)     TempSrc: Oral Oral     SpO2: 96% 95% 97% 100%   Weight:       Height:        Body mass index is 22 25 kg/m²  I/O last 24 hours:   In: 2536 [P O :480; I V :1056; IV Piggyback:1000]  Out: -       Physical Exam: /71   Pulse 88   Temp 98 2 °F (36 8 °C) (Oral)   Resp 18   Ht 6' 3" (1 905 m)   Wt 80 7 kg (178 lb)   SpO2 100%   BMI 22 25 kg/m²   General appearance: alert, appears stated age, cooperative and no distress  Chest wall: right sided chest wall tenderness  Heart: regular rate and rhythm, S1, S2 normal, no murmur, click, rub or gallop  Abdomen: Exquisite tenderness right umbilicus no rebound normoactive bowel sounds  Extremities: nontender  Neurologic: Grossly normal     Invasive Devices     Central Venous Catheter Line 12 0 - 17 0 g/dL    Hematocrit 29 4 (L) 36 5 - 49 3 %    MCV 62 (L) 82 - 98 fL    MCH 19 5 (L) 26 8 - 34 3 pg    MCHC 31 6 31 4 - 37 4 g/dL    RDW 17 1 (H) 11 6 - 15 1 %    Platelets 625 (H) 869 - 390 Thousands/uL    MPV 8 6 (L) 8 9 - 12 7 fL       Radiology Results: I have personally reviewed pertinent reports  Other Diagnostic Testing:   I have personally reviewed pertinent reports          Active Meds:   Current Facility-Administered Medications   Medication Dose Route Frequency    acetaminophen (TYLENOL) tablet 975 mg  975 mg Oral Q8H [address]    calcium carbonate (TUMS) chewable tablet 500 mg  500 mg Oral BID PRN    docusate sodium (COLACE) capsule 100 mg  100 mg Oral BID    enoxaparin (LOVENOX) subcutaneous injection 40 mg  40 mg Subcutaneous Daily    fentaNYL 1250 mcg in sodium chloride 0 9% 125mL drip  50 mcg/hr Intravenous Continuous    fentanyl citrate (PF) 100 MCG/2ML 50 mcg  50 mcg Intravenous Q2H PRN    gabapentin (NEURONTIN) capsule 400 mg  400 mg Oral HS    LORazepam (ATIVAN) 2 mg/mL injection 0 5 mg  0 5 mg Intravenous Q4H PRN    melatonin tablet 3 mg  3 mg Oral HS    mirtazapine (REMERON) tablet 15 mg  15 mg Oral HS    oxyCODONE (ROXICODONE) immediate release tablet 20 mg  20 mg Oral Q3H PRN    oxyCODONE (ROXICODONE) IR tablet 15 mg  15 mg Oral Q3H PRN    pantoprazole (PROTONIX) EC tablet 40 mg  40 mg Oral Early Morning    pneumococcal 13-valent conjugate vaccine (PREVNAR-13) IM injection 0 5 mL  0 5 mL Intramuscular Prior to discharge    polyethylene glycol (MIRALAX) packet 17 g  17 g Oral Daily    promethazine (PHENERGAN) injection 25 mg  25 mg Intravenous Q6H    senna (SENOKOT) tablet 17 2 mg  2 tablet Oral Daily    sodium chloride 0 9 % infusion  75 mL/hr Intravenous Continuous         VTE Pharmacologic Prophylaxis: Enoxaparin (Lovenox)  VTE Mechanical Prophylaxis: sequential compression device    Janet Dickens MD

## 2017-10-27 NOTE — PROGRESS NOTES
10/26/17 1055   Psychosocial   Patient Behaviors/Mood Appropriate for situation; Other (Comment)  (in extreme pain, hasn't slept in days)   Ability to Express Feelings Able to express   Length of Time/Family Visitation 31 min -1hr   Stress Factors   Patient Stress Factors Health changes   Family Stress Factors Health changes   Coping Responses   Patient Coping Open/discussion   Family Coping Open/discussion   Patient Spiritual Assessment   Feelings of Discouragement wants relief from cronic pain   Plan of Care   Care Plan Initiated Yes   Comments connects [facility] with [facility] to address concerns of where to go from here   Assessment Completed by: Unit visit

## 2017-10-27 NOTE — PLAN OF CARE
PAIN - ADULT     Verbalizes/displays adequate comfort level or baseline comfort level Progressing        Potential for Falls     Patient will remain free of falls Progressing        RESPIRATORY - ADULT     Achieves optimal ventilation and oxygenation Progressing

## 2017-10-27 NOTE — CASE MANAGEMENT
Initial Clinical Review    St  [address] [facility] in the [facility] by Mikey Garay for 2017  Network Utilization Review Department  Phone: [phone]; Fax [phone]  ATTENTION: The Network Utilization Review Department is now centralized for our 7 Facilities  Make a note that we have a new phone and fax numbers for our Department  Please call with any questions or concerns to [phone] and carefully follow the prompts so that you are directed to the right person  All voicemails are confidential  Fax any determinations, approvals, denials, and requests for initial or continue stay review clinical to [phone]  Due to HIGH CALL volume, it would be easier if you could please send faxed requests to expedite your requests and in part, help us provide discharge notifications faster  Admission: Date/Time/Statement: 10/26/17 @ 0841     Orders Placed This Encounter   Procedures    Inpatient Admission (expected length of stay for this patient is greater than two midnights)     Standing Status:   Standing     Number of Occurrences:   1     Order Specific Question:   Admitting Physician     Answer:   Madiha Bronson     Order Specific Question:   Level of Care     Answer:   Med Surg [16]     Order Specific Question:   Estimated length of stay     Answer:   More than 2 Midnights     Order Specific Question:   Certification     Answer:   I certify that inpatient services are medically necessary for this patient for a duration of greater than two midnights  See H&P and MD Progress Notes for additional information about the patient's course of treatment           ED: Date/Time/Mode of Arrival:   ED Arrival Information     Expected Arrival Acuity Means of Arrival Escorted By Service Admission Type    10/26/2017 05:52 10/26/2017 05:51 Emergent Ambulance - General Medicine Emergency    Arrival Complaint    Chest Pain          Chief Complaint:   Chief Complaint   Patient presents with    Chest Pain     Per EMS, pt c/o chest pain around port site and nausea  Per pt, pt was supposed to be admitted today to start radiation/chemo for lung ca  History of Illness: Kina Ba is a 42 y o  male this is a 26-year-old male with past medical history of stage IV adenocarcinoma of the right lung who reports to [facility] in [place] with multiple complaints  Patient sees Dr Ame Magdaleno of Oncology as an outpatient  He was seen yesterday, and his oncologist had recommended him to come into the hospital for further evaluation, namely biopsy and imaging  Patient had initially declined, but last night, he was having shortness of breath as well as increasingly worsening back and chest pain  He also reports feeling nauseous with multiple episodes of vomiting, but no hematemesis  The pain was refractory to oxycodone and fentanyl  In addition, patient reports yellowing of his eyes over the last several months and recent weight loss  He has poor appetite and is unable to sleep  Also, he reports difficulty with urinating as well as constipation  No dysuria or hematuria      He came into the ED, where he underwent a chest PE study  CT scan showed worsening tumor burden, with increasing right sided lung mass, new 5 cm left lung nodule, worsening right adrenal mass, new left adrenal mass, and extensive liver metastasis        ED Vital Signs:   Temperature Pulse Respirations Blood Pressure SpO2   (!) 96 1 °F (35 6 °C) (!) 114 (!) 24 131/86 98 %RA sat    10/26/17 80 7 kg (178 lb)   Abnormal Labs/Diagnostic Test Results:    Ref Range & Units 10/27/17 0618 10/26/17 0615   WBC 4 31 - 10 16 Thousand/uL 7 60  9 86    RBC 3 88 - 5 62 Million/uL 4 77  5 37    Hemoglobin 12 0 - 17 0 g/dL 9 3   10 9     Hematocrit 36 5 - 49 3 % 29 4   33 3     MCV 82 - 98 fL 62   62     MCH 26 8 - 34 3 pg 19 5   20 3     MCHC 31 4 - 37 4 g/dL 31 6  32 7    RDW 11 6 - 15 1 % 17 1   17 1     Platelets 137 - 888 Thousands/uL 518   603     MPV 8 9 - 12 7 fL 8 6   8 7              Ref Range & Units 10/27/17 0618 10/26/17 0615   Sodium 136 - 145 mmol/L 133   133     Potassium 3 5 - 5 3 mmol/L 4 3  4 2CM    Chloride 100 - 108 mmol/L 101  99     CO2 21 - 32 mmol/L 25  25    Anion Gap 4 - 13 mmol/L 7  9    BUN 5 - 25 mg/dL 11  10    Creatinine 0 60 - 1 30 mg/dL 0 67  0 91CM    Glucose 65 - 140 mg/dL 91  104CM    Calcium 8 3 - 10 1 mg/dL 9 2  9 9    AST 5 - 45 U/L 45  60CM     ALT 12 - 78 U/L 22  25CM    Alkaline Phosphatase 46 - 116 U/L 166   196     Total Protein 6 4 - 8 2 g/dL 7 6  9 1     Albumin 3 5 - 5 0 g/dL 2 7   3 5    Total Bilirubin 0 20 - 1 00 mg/dL 0 41  0 38    eGFR ml/min/1 73sq m 140  122            CTA chest  - New very large right upper lobe mass, which compresses the hilum, and is inseparable from the mediastinum  Increased right middle lobe consolidation representing tumor versus post obstructive atelectasis  Right hilar adenopathy  - New 5 cm left apical nodule  - Worsening large right adrenal metastatic mass  New extensive hepatic metastatic disease  New left adrenal metastatic disease      ED Treatment:   Medication Administration from 10/26/2017 0552 to 10/26/2017 1432       Date/Time Order Dose Route     10/26/2017 0613 metoclopramide (REGLAN) injection 10 mg 10 mg Intravenous     10/26/2017 0920 oxyCODONE (ROXICODONE) immediate release tablet 10 mg 10 mg Oral     10/26/2017 0747 promethazine (PHENERGAN) injection 25 mg 25 mg Intravenous     10/26/2017 0752 HYDROmorphone (DILAUDID) 1 mg/mL injection 1 mg 1 mg Intravenous     10/26/2017 0752 sodium chloride 0 9 % bolus 1,000 mL 1,000 mL Intravenous     10/26/2017 0948 iohexol (OMNIPAQUE) 350 MG/ML injection (MULTI-DOSE) 85 mL 85 mL Intravenous     10/26/2017 1346 promethazine (PHENERGAN) injection 25 mg 25 mg Intramuscular     10/26/2017 1346 oxyCODONE (ROXICODONE) IR tablet 5 mg 5 mg Oral          Past Medical/Surgical History:       Past Medical History:   Diagnosis Date    Anxiety     GERD (gastroesophageal reflux disease)     Lung cancer ([facility] [address] )        Admitting Diagnosis: Tachypnea [R06 82]  Chest pain [R07 9]  Tachycardia [R00 0]  Pain of metastatic malignancy [G89 3]  Adenocarcinoma of right lung (HCC) [C34 91]  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) [C79 70]    Age/Sex: 42 y o  male    Assessment/Plan:    Stage IV adenocarcinoma of lung  Patient diagnosed approximately 4 years ago  Has received chemotherapy and immunotherapy as an outpatient  No treatment this year  Sees Dr Sangita Roman as an outpatient  CT PE study showed worsening tumor burden  · Follow-up Oncology and palliative Care consult  · Will obtain MRI of the brain and spine to assess for metastatic disease  · Monitor blood counts and serum electrolytes     Adrenal gland/liver metastases  · Patient has extensive tumor burden  Follow-up repeat imaging to better assess tumor burden      Pain of metastatic malignancy with continuous opioid dependence  Patient is on a 50 mcg fentanyl patch and oxycodone as an outpatient  However, he reports that these are not helping his pain currently  He normally sees palliative care as an outpatient  · He reports pain in his back, right shoulder, right chest around the site of his port, and his abdomen  · We will continue oxycodone p r n  and fentanyl, as well as Tylenol  · Follow-up palliative care recommendations  · Dilaudid for breakthrough pain     Apneic episodes  Received word from consulting service that the patient was also reporting John Dena at night, in which he felt like he had stopped breathing and had to wake up  · Will monitor overnight pulse oximetry      Nausea/vomiting  · Patient states he has had a bad reaction to Zofran in the past  · Continue Phenergan p r n      Anxiety/Depression  · Patient very anxious, most likely secondary to his situation  Will wait for palliative care consult    Not on any medications as an outpatient      Insomnia  Likely multifactorial from pain, nausea, and anxiety  Patient states he has tried Ambien before with no benefit  · Continue treatment for medical issues as outlined above  · Consider Benadryl for sedation         Admission Orders:  Scheduled Meds:   acetaminophen 975 mg Oral Q8H [address]   docusate sodium 100 mg Oral BID   enoxaparin 40 mg Subcutaneous Daily   gabapentin 400 mg Oral HS   melatonin 3 mg Oral HS   mirtazapine 15 mg Oral HS   pantoprazole 40 mg Oral Early Morning   polyethylene glycol 17 g Oral Daily   promethazine 25 mg Intravenous Q6H   senna 2 tablet Oral Daily     Continuous Infusions:   fentaNYL 50 mcg/hr Last Rate: 50 mcg/hr (10/26/17 9690)   sodium chloride 75 mL/hr Last Rate: 75 mL/hr (10/27/17 0620)     PRN Meds: calcium carbonate po 10/26 x 1     fentanyl citrate (PF)    LORazepam iv 10/26 x 1 - 10/27 x 1     oxyCODONE 20 mg po 10/26 x 2 - 10/27 x 2     oxyCODONE 15 mg po 10/26 x 1   Oxycodone 5 mg po 10/26 x 1   Phenergan  Iv 10/26 x 1     pneumococcal 13-valent conjugate vaccine    Nursing orders - VS q 4 - up and OOB as tolerated - Venodynes to le's- Diet regular   Palliative care  -   Plan:  1   Symptom management - Cancer related pain, anxiety, Nausea/vomiting, insomnia, constipation                - Xanax 0 5 mg PO X 1 NOW              - D/C Fentanyl Patches              - Start Fentanyl drip 50 mcg/hr              - Oxy IR 15-20 mg PO Q 3 PRN              - Fentanyl 50 mcg IV Q 2 PRN breakthrough              - Phenergan 25 mg IV Q 6 scheduled              - Lorazepam 0 5 mg IV Q 4 PRN              - Gabapentin 400  Mg PO Q HS (home med)              - Mirtazapine 15 mg PO Q HS (home med)              - Melatonin 3 mg PO Q HS              - Bowel regimen as ordered               -Will defer use of steroids at this time until further imaging studies are completed and course of therapy decided (Pt may be a candidate for immunotherapy)    Oncology  - 36yM with stage IV adenocarcinoma of lung     1  Stage IV adenocarcinoma of right lung  -Progressive disease per imaging on this admission  -Patient desires continued treatment of disease   -Surgical pathology from 9/2015, RUL, reviewed - TTF-1 positive and CK7 patchy positive, P 40 negative   -Possible biopsy of new metastatic liver lesion  Will attempt to schedule biopsy during this admission  -patient likely require chemotherapy however this palliative hopefully decrease burden but is not curative  -will follow up CT chest abdomen pelvis as well as bone scan, CEA  -MRI brain/spine due to continued pain with possible spinal chord involvement      2   Cancer related pain  -Last palliative care note reviewed, along with current regimen   -Palliative care to continue to follow and adjust in consult

## 2017-10-28 RX ORDER — DOXEPIN HYDROCHLORIDE 25 MG/1
25 CAPSULE ORAL
Status: DISCONTINUED | OUTPATIENT
Start: 2017-10-28 | End: 2017-11-05

## 2017-10-28 RX ORDER — SENNOSIDES 8.6 MG
2 TABLET ORAL 2 TIMES DAILY
Status: DISCONTINUED | OUTPATIENT
Start: 2017-10-28 | End: 2017-11-06 | Stop reason: HOSPADM

## 2017-10-28 RX ORDER — NICOTINE 21 MG/24HR
21 PATCH, TRANSDERMAL 24 HOURS TRANSDERMAL DAILY
Status: DISCONTINUED | OUTPATIENT
Start: 2017-10-28 | End: 2017-11-06 | Stop reason: HOSPADM

## 2017-10-28 RX ORDER — FENTANYL CITRATE 50 UG/ML
100 INJECTION, SOLUTION INTRAMUSCULAR; INTRAVENOUS ONCE
Status: COMPLETED | OUTPATIENT
Start: 2017-10-28 | End: 2017-10-28

## 2017-10-28 RX ADMIN — DOCUSATE SODIUM 100 MG: 100 CAPSULE, LIQUID FILLED ORAL at 08:51

## 2017-10-28 RX ADMIN — GABAPENTIN 400 MG: 400 CAPSULE ORAL at 21:42

## 2017-10-28 RX ADMIN — PROCHLORPERAZINE MALEATE 10 MG: 10 TABLET, FILM COATED ORAL at 05:35

## 2017-10-28 RX ADMIN — FENTANYL CITRATE 50 MCG: 50 INJECTION INTRAMUSCULAR; INTRAVENOUS at 03:13

## 2017-10-28 RX ADMIN — OXYCODONE HYDROCHLORIDE 10 MG: 10 TABLET ORAL at 03:17

## 2017-10-28 RX ADMIN — OXYCODONE HYDROCHLORIDE 20 MG: 10 TABLET ORAL at 10:30

## 2017-10-28 RX ADMIN — FENTANYL CITRATE 75 MCG/HR: at 06:19

## 2017-10-28 RX ADMIN — NICOTINE 21 MG: 21 PATCH, EXTENDED RELEASE TRANSDERMAL at 12:11

## 2017-10-28 RX ADMIN — DOCUSATE SODIUM 100 MG: 100 CAPSULE, LIQUID FILLED ORAL at 17:13

## 2017-10-28 RX ADMIN — ACETAMINOPHEN 975 MG: 325 TABLET, FILM COATED ORAL at 05:36

## 2017-10-28 RX ADMIN — ALPRAZOLAM 0.5 MG: 0.5 TABLET ORAL at 17:13

## 2017-10-28 RX ADMIN — ACETAMINOPHEN 975 MG: 325 TABLET, FILM COATED ORAL at 21:43

## 2017-10-28 RX ADMIN — OXYCODONE HYDROCHLORIDE 20 MG: 10 TABLET ORAL at 06:21

## 2017-10-28 RX ADMIN — MELATONIN TAB 3 MG 3 MG: 3 TAB at 21:43

## 2017-10-28 RX ADMIN — OXYCODONE HYDROCHLORIDE 15 MG: 5 TABLET ORAL at 03:14

## 2017-10-28 RX ADMIN — DOXEPIN HYDROCHLORIDE 25 MG: 25 CAPSULE ORAL at 21:42

## 2017-10-28 RX ADMIN — OXYCODONE HYDROCHLORIDE 20 MG: 10 TABLET ORAL at 18:56

## 2017-10-28 RX ADMIN — OXYCODONE HYDROCHLORIDE 20 MG: 10 TABLET ORAL at 21:55

## 2017-10-28 RX ADMIN — SENNOSIDES 17.2 MG: 8.6 TABLET, FILM COATED ORAL at 08:51

## 2017-10-28 RX ADMIN — SENNOSIDES 17.2 MG: 8.6 TABLET, FILM COATED ORAL at 17:13

## 2017-10-28 RX ADMIN — PANTOPRAZOLE SODIUM 40 MG: 40 TABLET, DELAYED RELEASE ORAL at 05:36

## 2017-10-28 RX ADMIN — SODIUM CHLORIDE 75 ML/HR: 0.9 INJECTION, SOLUTION INTRAVENOUS at 21:47

## 2017-10-28 RX ADMIN — OXYCODONE HYDROCHLORIDE 20 MG: 10 TABLET ORAL at 13:41

## 2017-10-28 RX ADMIN — POLYETHYLENE GLYCOL 3350 17 G: 17 POWDER, FOR SOLUTION ORAL at 08:48

## 2017-10-28 RX ADMIN — FENTANYL CITRATE 50 MCG: 50 INJECTION INTRAMUSCULAR; INTRAVENOUS at 05:35

## 2017-10-28 RX ADMIN — FENTANYL CITRATE 100 MCG/HR: at 17:18

## 2017-10-28 RX ADMIN — ALPRAZOLAM 0.5 MG: 0.5 TABLET ORAL at 21:43

## 2017-10-28 RX ADMIN — DIPHENHYDRAMINE HCL 25 MG: 25 TABLET ORAL at 05:35

## 2017-10-28 RX ADMIN — ALPRAZOLAM 0.5 MG: 0.5 TABLET ORAL at 08:51

## 2017-10-28 RX ADMIN — ACETAMINOPHEN 975 MG: 325 TABLET, FILM COATED ORAL at 14:36

## 2017-10-28 RX ADMIN — ENOXAPARIN SODIUM 40 MG: 40 INJECTION SUBCUTANEOUS at 08:50

## 2017-10-28 RX ADMIN — PROCHLORPERAZINE MALEATE 10 MG: 10 TABLET, FILM COATED ORAL at 18:56

## 2017-10-28 RX ADMIN — FENTANYL CITRATE 100 MCG: 50 INJECTION INTRAMUSCULAR; INTRAVENOUS at 08:51

## 2017-10-28 RX ADMIN — MIRTAZAPINE 15 MG: 15 TABLET, FILM COATED ORAL at 21:43

## 2017-10-28 NOTE — PLAN OF CARE
DISCHARGE PLANNING - CARE MANAGEMENT     Discharge to post-acute care or home with appropriate resources Progressing        PAIN - ADULT     Verbalizes/displays adequate comfort level or baseline comfort level Progressing        Potential for Falls     Patient will remain free of falls Progressing        RESPIRATORY - ADULT     Achieves optimal ventilation and oxygenation Progressing

## 2017-10-28 NOTE — PROGRESS NOTES
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)    Nausea    Neuropathic pain syndrome (non-herpetic)    Under care of palliative care physician    Abdominal pain    Back pain    Chest pain    Stage IV adenocarcinoma of lung  Patient diagnosed approximately 4 years ago  Bryn Boothe received chemotherapy and immunotherapy as an outpatient   No treatment this year   Sees Dr POOL LADY [facility] as an outpatient   CT PE study showed worsening tumor burden  · Complete metastatic workup with MRI  · Will obtain MRI of the brain and spine to assess for metastatic disease  · Monitor blood counts and serum electrolytes  · Liver bx with IR     Adrenal gland/liver metastases  · Patient has extensive tumor burden   Follow-up repeat imaging to better assess tumor burden      Pain of metastatic malignancy with continuous opioid dependence  Patient reports improvement with this pain although he still is not adequately controlled  · Continue fentanyl drip 75 micrograms/hour, consider increasing to 100  · Oxycodone IR 15-20 mg p o  q 3 p r n  · Gabapentin 400mg PO Q HS  · Fentanyl 50 mcg IV Q2 PRN breakthrough   · Continue bowel regimen     Apneic episodes  Received word from consulting service that the patient was also reporting apneic episodes at night, in which he felt like he had stopped breathing and had to wake up  · Will monitor overnight pulse oximetry      Nausea/vomiting  · Patient states he has had a bad reaction to Zofran in the past  · Continue Phenergan p r n  Urticaria   · Patient had 2 episodes responsive to Benadryl  No previous history    Patient not on any medications that are new that has been previously tolerated  Suspect may be related to tracer for bone scan although will continue to monitor  · Continue p r n  Benadryl    Anxiety/Depression  · Patient continues to report anxiety on current regimen  · Continue lorazepam 0 5 mg IV Q for p r n  · Consider dose escalation will defer to palliative care     Insomnia  Likely multifactorial from pain, nausea, and anxiety   Patient states he has tried Ambien before with no benefit  · Continue treatment for medical issues as outlined above  · Add melatonin 3 mg p o  q h s  Disposition:  Continue inpatient care      Subjective:   Patient reports improvement with his pain although he still says he is not adequately controlled  Patient was previously complaining of right-sided chest wall and abdominal pain that is still present but has improved  His pleuritic chest pain has improved  Patient still reports nausea which is responsive to Phenergan  Patient does not have any dyspnea  Patient is not a bowel movement yesterday  Vitals: Temp (24hrs), Av 4 °F (36 9 °C), Min:98 1 °F (36 7 °C), Max:98 5 °F (36 9 °C)  Current: Temperature: 98 5 °F (36 9 °C)  Vitals:    10/27/17 1052 10/27/17 1453 10/27/17 1900 10/27/17 2300   BP: 132/54 117/73 130/81 117/63   Pulse: 82 104 97 88   Resp: 19 20 20 20   Temp: 98 3 °F (36 8 °C) 98 1 °F (36 7 °C) 98 5 °F (36 9 °C) 98 5 °F (36 9 °C)   TempSrc: Oral Oral Oral Oral   SpO2: 97% 100% 94% 96%   Weight:       Height:        Body mass index is 22 25 kg/m²  I/O last 24 hours: In: 1362 5 [P O :300;  I V :1062 5]  Out: 450 [Urine:450]      Physical Exam: /63   Pulse 88   Temp 98 5 °F (36 9 °C) (Oral)   Resp 20   Ht 6' 3" (1 905 m)   Wt 80 7 kg (178 lb)   SpO2 96%   BMI 22 25 kg/m²   General appearance: alert, appears stated age, cooperative and no distress  Lungs: diminished breath sounds RLL and RML  Chest wall: Mild right chest wall  Heart: regular rate and rhythm, S1, S2 normal, no murmur, click, rub or gallop  Abdomen: Mild right-sided abdominal tenderness  Extremities: extremities normal, atraumatic, no cyanosis or edema    Invasive Devices     Central Venous Catheter Line            Port A Cath 10/26/17 Right Chest 1 day                          Labs: No results found for this or any previous visit (from the past 24 hour(s))  Radiology Results: I have personally reviewed pertinent reports  Other Diagnostic Testing:   I have personally reviewed pertinent reports          Active Meds:   Current Facility-Administered Medications   Medication Dose Route Frequency    acetaminophen (TYLENOL) tablet 975 mg  975 mg Oral Q8H [address]    ALPRAZolam (XANAX) tablet 0 5 mg  0 5 mg Oral TID    calcium carbonate (TUMS) chewable tablet 500 mg  500 mg Oral BID PRN    diphenhydrAMINE (BENADRYL) tablet 25 mg  25 mg Oral Q8H PRN    docusate sodium (COLACE) capsule 100 mg  100 mg Oral BID    doxepin (SINEquan) capsule 25 mg  25 mg Oral HS    enoxaparin (LOVENOX) subcutaneous injection 40 mg  40 mg Subcutaneous Daily    fentaNYL 1250 mcg in sodium chloride 0 9% 125mL drip  100 mcg/hr Intravenous Continuous    fentanyl citrate (PF) 100 MCG/2ML 100 mcg  100 mcg Intravenous Once    fentanyl citrate (PF) 100 MCG/2ML 50 mcg  50 mcg Intravenous Q2H PRN    gabapentin (NEURONTIN) capsule 400 mg  400 mg Oral HS    melatonin tablet 3 mg  3 mg Oral HS    mirtazapine (REMERON) tablet 15 mg  15 mg Oral HS    oxyCODONE (ROXICODONE) immediate release tablet 20 mg  20 mg Oral Q3H PRN    oxyCODONE (ROXICODONE) IR tablet 15 mg  15 mg Oral Q3H PRN    pantoprazole (PROTONIX) EC tablet 40 mg  40 mg Oral Early Morning    pneumococcal 13-valent conjugate vaccine (PREVNAR-13) IM injection 0 5 mL  0 5 mL Intramuscular Prior to discharge    polyethylene glycol (MIRALAX) packet 17 g  17 g Oral Daily    prochlorperazine (COMPAZINE) tablet 10 mg  10 mg Oral Q6H PRN    senna (SENOKOT) tablet 17 2 mg  2 tablet Oral BID    sodium chloride 0 9 % infusion  75 mL/hr Intravenous Continuous    white petrolatum-mineral oil (EUCERIN,HYDROCERIN) cream   Topical PRN         VTE Pharmacologic Prophylaxis: Enoxaparin (Lovenox)  VTE Mechanical Prophylaxis: sequential compression device    Vandana Tubbs MD

## 2017-10-28 NOTE — PROGRESS NOTES
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)    Nausea    Neuropathic pain syndrome (non-herpetic)    Elevated rheumatoid factor    Under care of palliative care physician    Abdominal pain    Back pain    Chest pain   patient seen and examined  Still with significant pain rated up to 8/10  Unable to lie flat  Gets relief with oxycodone for up to 3 hours but then awakens  Still with persistent itching  No BM for 2 days  For MRI  Plan:   Increase fentanyl infusion to 100 mcg/hr  Consider fentanyl patch once dosing levels stable  Add Doxepin 25 mg at bedtime to aid with sleep and with itching as it has significant anti-histamine effect  Dulcolax suppository if no BM today      Pain History  Current pain location(s): right flank  Pain Scale:   numeric  Severity:  10/10  Quality: aching  Current Analgesic regimen:  Fentanyl infusion at 75 mcg/hr with oxycodone 15-20 mg q 3 hours  24 hour history: requiring every 3 hours    Meds/Allergies   all current active meds have been reviewed and current meds:   Current Facility-Administered Medications   Medication Dose Route Frequency    acetaminophen (TYLENOL) tablet 975 mg  975 mg Oral Q8H [address]    ALPRAZolam (XANAX) tablet 0 5 mg  0 5 mg Oral TID    calcium carbonate (TUMS) chewable tablet 500 mg  500 mg Oral BID PRN    diphenhydrAMINE (BENADRYL) tablet 25 mg  25 mg Oral Q8H PRN    docusate sodium (COLACE) capsule 100 mg  100 mg Oral BID    doxepin (SINEquan) capsule 25 mg  25 mg Oral HS    enoxaparin (LOVENOX) subcutaneous injection 40 mg  40 mg Subcutaneous Daily  fentaNYL 1250 mcg in sodium chloride 0 9% 125mL drip  100 mcg/hr Intravenous Continuous    fentanyl citrate (PF) 100 MCG/2ML 100 mcg  100 mcg Intravenous Once    fentanyl citrate (PF) 100 MCG/2ML 50 mcg  50 mcg Intravenous Q2H PRN    gabapentin (NEURONTIN) capsule 400 mg  400 mg Oral HS    melatonin tablet 3 mg  3 mg Oral HS    mirtazapine (REMERON) tablet 15 mg  15 mg Oral HS    oxyCODONE (ROXICODONE) immediate release tablet 20 mg  20 mg Oral Q3H PRN    oxyCODONE (ROXICODONE) IR tablet 15 mg  15 mg Oral Q3H PRN    pantoprazole (PROTONIX) EC tablet 40 mg  40 mg Oral Early Morning    pneumococcal 13-valent conjugate vaccine (PREVNAR-13) IM injection 0 5 mL  0 5 mL Intramuscular Prior to discharge    polyethylene glycol (MIRALAX) packet 17 g  17 g Oral Daily    prochlorperazine (COMPAZINE) tablet 10 mg  10 mg Oral Q6H PRN    senna (SENOKOT) tablet 17 2 mg  2 tablet Oral BID    sodium chloride 0 9 % infusion  75 mL/hr Intravenous Continuous    white petrolatum-mineral oil (EUCERIN,HYDROCERIN) cream   Topical PRN     Pain Management Medications: OME: >700    Allergies   Allergen Reactions    Zofran [Ondansetron] GI Intolerance       Objective     Temp:  [98 1 °F (36 7 °C)-98 5 °F (36 9 °C)] 98 5 °F (36 9 °C)  HR:  [] 88  Resp:  [19-20] 20  BP: (117-132)/(54-81) 117/63      Intake/Output Summary (Last 24 hours) at 10/28/17 0813  Last data filed at 10/28/17 0501   Gross per 24 hour   Intake           1362 5 ml   Output              450 ml   Net            912 5 ml       Physical Exam: /63   Pulse 88   Temp 98 5 °F (36 9 °C) (Oral)   Resp 20   Ht 6' 3" (1 905 m)   Wt 80 7 kg (178 lb)   SpO2 96%   BMI 22 25 kg/m²   General appearance: alert, appears stated age, cooperative and moderate distress  Lungs: clear to auscultation bilaterally  Heart: regular rate and rhythm, S1, S2 normal, no murmur, click, rub or gallop  Abdomen: soft, non-tender; bowel sounds normal; no masses,  no

## 2017-10-28 NOTE — PROGRESS NOTES
Mr Jose Miguel Hernandez is very pleasant; He had all of his nighttime scheduled meds and prn pain meds; Pt sates that his pain is about an 8  Mostly in his right shoulder; His main goal is to get some sleep tonight; he reports no shortness of breath or chest pain; He does have hives scattered on his arms and back that he reports to be itchy, so I gave him Benadryl too

## 2017-10-29 LAB
ANION GAP SERPL CALCULATED.3IONS-SCNC: 8 MMOL/L (ref 4–13)
BUN SERPL-MCNC: 8 MG/DL (ref 5–25)
CALCIUM SERPL-MCNC: 9.1 MG/DL (ref 8.3–10.1)
CHLORIDE SERPL-SCNC: 100 MMOL/L (ref 100–108)
CO2 SERPL-SCNC: 25 MMOL/L (ref 21–32)
CREAT SERPL-MCNC: 0.7 MG/DL (ref 0.6–1.3)
ERYTHROCYTE [DISTWIDTH] IN BLOOD BY AUTOMATED COUNT: 16.9 % (ref 11.6–15.1)
GFR SERPL CREATININE-BSD FRML MDRD: 137 ML/MIN/1.73SQ M
GLUCOSE SERPL-MCNC: 121 MG/DL (ref 65–140)
HCT VFR BLD AUTO: 29.6 % (ref 36.5–49.3)
HGB BLD-MCNC: 9.5 G/DL (ref 12–17)
MCH RBC QN AUTO: 19.8 PG (ref 26.8–34.3)
MCHC RBC AUTO-ENTMCNC: 32.1 G/DL (ref 31.4–37.4)
MCV RBC AUTO: 62 FL (ref 82–98)
PLATELET # BLD AUTO: 478 THOUSANDS/UL (ref 149–390)
PMV BLD AUTO: 8.3 FL (ref 8.9–12.7)
POTASSIUM SERPL-SCNC: 3.7 MMOL/L (ref 3.5–5.3)
RBC # BLD AUTO: 4.81 MILLION/UL (ref 3.88–5.62)
SODIUM SERPL-SCNC: 133 MMOL/L (ref 136–145)
WBC # BLD AUTO: 6.81 THOUSAND/UL (ref 4.31–10.16)

## 2017-10-29 PROCEDURE — 85027 COMPLETE CBC AUTOMATED: CPT | Performed by: INTERNAL MEDICINE

## 2017-10-29 PROCEDURE — 80048 BASIC METABOLIC PNL TOTAL CA: CPT | Performed by: INTERNAL MEDICINE

## 2017-10-29 RX ORDER — DEXAMETHASONE SODIUM PHOSPHATE 4 MG/ML
4 INJECTION, SOLUTION INTRA-ARTICULAR; INTRALESIONAL; INTRAMUSCULAR; INTRAVENOUS; SOFT TISSUE 2 TIMES DAILY
Status: DISCONTINUED | OUTPATIENT
Start: 2017-10-29 | End: 2017-11-02

## 2017-10-29 RX ORDER — DIAZEPAM 5 MG/ML
5 INJECTION, SOLUTION INTRAMUSCULAR; INTRAVENOUS EVERY 8 HOURS SCHEDULED
Status: DISCONTINUED | OUTPATIENT
Start: 2017-10-29 | End: 2017-10-30

## 2017-10-29 RX ADMIN — OXYCODONE HYDROCHLORIDE 20 MG: 10 TABLET ORAL at 09:07

## 2017-10-29 RX ADMIN — POLYETHYLENE GLYCOL 3350 17 G: 17 POWDER, FOR SOLUTION ORAL at 09:07

## 2017-10-29 RX ADMIN — OXYCODONE HYDROCHLORIDE 20 MG: 10 TABLET ORAL at 15:03

## 2017-10-29 RX ADMIN — DIAZEPAM 5 MG: 5 INJECTION, SOLUTION INTRAMUSCULAR; INTRAVENOUS at 22:32

## 2017-10-29 RX ADMIN — FENTANYL CITRATE 50 MCG: 50 INJECTION INTRAMUSCULAR; INTRAVENOUS at 11:55

## 2017-10-29 RX ADMIN — FENTANYL CITRATE 100 MCG/HR: at 17:57

## 2017-10-29 RX ADMIN — OXYCODONE HYDROCHLORIDE 20 MG: 10 TABLET ORAL at 02:12

## 2017-10-29 RX ADMIN — DIPHENHYDRAMINE HCL 25 MG: 25 TABLET ORAL at 11:23

## 2017-10-29 RX ADMIN — FENTANYL CITRATE 50 MCG: 50 INJECTION INTRAMUSCULAR; INTRAVENOUS at 04:46

## 2017-10-29 RX ADMIN — OXYCODONE HYDROCHLORIDE 20 MG: 10 TABLET ORAL at 17:58

## 2017-10-29 RX ADMIN — DOCUSATE SODIUM 100 MG: 100 CAPSULE, LIQUID FILLED ORAL at 17:04

## 2017-10-29 RX ADMIN — OXYCODONE HYDROCHLORIDE 20 MG: 10 TABLET ORAL at 06:09

## 2017-10-29 RX ADMIN — ACETAMINOPHEN 975 MG: 325 TABLET, FILM COATED ORAL at 05:00

## 2017-10-29 RX ADMIN — PANTOPRAZOLE SODIUM 40 MG: 40 TABLET, DELAYED RELEASE ORAL at 05:00

## 2017-10-29 RX ADMIN — GABAPENTIN 400 MG: 400 CAPSULE ORAL at 22:33

## 2017-10-29 RX ADMIN — SENNOSIDES 17.2 MG: 8.6 TABLET, FILM COATED ORAL at 17:04

## 2017-10-29 RX ADMIN — SODIUM CHLORIDE 75 ML/HR: 0.9 INJECTION, SOLUTION INTRAVENOUS at 11:50

## 2017-10-29 RX ADMIN — ALPRAZOLAM 0.5 MG: 0.5 TABLET ORAL at 09:06

## 2017-10-29 RX ADMIN — FENTANYL CITRATE 100 MCG/HR: at 04:01

## 2017-10-29 RX ADMIN — DOCUSATE SODIUM 100 MG: 100 CAPSULE, LIQUID FILLED ORAL at 09:06

## 2017-10-29 RX ADMIN — OXYCODONE HYDROCHLORIDE 20 MG: 10 TABLET ORAL at 21:08

## 2017-10-29 RX ADMIN — ACETAMINOPHEN 975 MG: 325 TABLET, FILM COATED ORAL at 13:46

## 2017-10-29 RX ADMIN — NICOTINE 21 MG: 21 PATCH, EXTENDED RELEASE TRANSDERMAL at 09:06

## 2017-10-29 RX ADMIN — DEXAMETHASONE SODIUM PHOSPHATE 4 MG: 4 INJECTION, SOLUTION INTRAMUSCULAR; INTRAVENOUS at 15:30

## 2017-10-29 RX ADMIN — ENOXAPARIN SODIUM 40 MG: 40 INJECTION SUBCUTANEOUS at 09:07

## 2017-10-29 RX ADMIN — Medication 500 MG: at 15:20

## 2017-10-29 RX ADMIN — OXYCODONE HYDROCHLORIDE 20 MG: 10 TABLET ORAL at 12:42

## 2017-10-29 RX ADMIN — ALPRAZOLAM 0.5 MG: 0.5 TABLET ORAL at 15:29

## 2017-10-29 RX ADMIN — MELATONIN TAB 3 MG 3 MG: 3 TAB at 22:33

## 2017-10-29 RX ADMIN — ACETAMINOPHEN 975 MG: 325 TABLET, FILM COATED ORAL at 22:33

## 2017-10-29 RX ADMIN — SENNOSIDES 17.2 MG: 8.6 TABLET, FILM COATED ORAL at 09:06

## 2017-10-29 NOTE — PROGRESS NOTES
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)    Nausea    Neuropathic pain syndrome (non-herpetic)    Under care of palliative care physician    Abdominal pain    Back pain    Chest pain       Stage IV adenocarcinoma of lung  · Dx 4 yrs ago, CT PE with worsening tumor burden, large right upper lobe mass with compression of the hilum and increased right middle lobe consolidation, possible tumor versus postobstructive atelectasis also right hilar adenopathy, new 5 cm left apical nodule  · NM bone scan without evidence of bony mets  · F/u liver biopsy with IR   · F/u MRI pan to further assess mets      Adrenal gland/liver metastases, largest hepatic lesion measuring 4 8 x 3 4 cm in the right hepatic lobe  · Awaiting MRI to better assess mets     Pain of metastatic malignancy with continuous opioid dependence  · Pain improved today  · Cont fentanyl gtt as needed for pain   · Palliative following  · Oxycodone for breakthrough pain   · Cont bowel regimen       Nausea/vomiting  · improved  · Continue Phenergan p r n         Urticaria   · Thought related to tracer from bone scan, improved with benadryl and will cont PRN      Anxiety/Depression    · Continue alprazolam 0 5mg TID   · Palliative care following and to adjust as recommended      Insomnia  · Improved, cont melatonin 3mg qhs          Disposition: pending management of pain and further MR imaging studies for eval of mets       Subjective:   Pt reports marked improvement in pain  Doing better  Episode of nausea last night however improved with phenergan   No vomiting, chest pain or difficulty breathing  Vitals: Temp (24hrs), Av °F (37 2 °C), Min:98 6 °F (37 °C), Max:99 3 °F (37 4 °C)  Current: Temperature: 99 1 °F (37 3 °C)  Vitals:    10/28/17 1500 10/28/17 1905 10/28/17 2300 10/29/17 0314   BP: 128/56 134/75 119/74 106/68   Pulse: 75 102 (!) 112 92   Resp: 18 18 20 20   Temp: 98 6 °F (37 °C) 99 1 °F (37 3 °C) 98 9 °F (37 2 °C) 99 1 °F (37 3 °C)   TempSrc: Oral Oral Oral Oral   SpO2: 97% 99% 98% 98%   Weight:       Height:        Body mass index is 22 25 kg/m²  I/O last 24 hours: In: 3117 [P O :1200; I V :1917]  Out: -       Physical Exam: General Appearance: awake  alert comfortable male, no acute distress  Neck: supple without JVD  Lungs: clear to auscultation bilaterally, no labored breathing/accessory muscle use no respiratory distress  Heart: regular rate and rhythm, S1, S2 normal, no murmur  Abdomen: soft, nondistended  Extremities: no deformity, no edema, full range of motion  Neuro: no focal deficits, speech is clear, moves all 4 extremities   Skin: warm, dry, not diaphoretic        Invasive Devices     Central Venous Catheter Line            Port A Cath 10/26/17 Right Chest 2 days                          Labs: No results found for this or any previous visit (from the past 24 hour(s))  Radiology Results: I have personally reviewed pertinent reports  Other Diagnostic Testing:   I have personally reviewed pertinent reports          Active Meds:   Current Facility-Administered Medications   Medication Dose Route Frequency    acetaminophen (TYLENOL) tablet 975 mg  975 mg Oral Q8H [address]    ALPRAZolam (XANAX) tablet 0 5 mg  0 5 mg Oral TID    calcium carbonate (TUMS) chewable tablet 500 mg  500 mg Oral BID PRN    diphenhydrAMINE (BENADRYL) tablet 25 mg  25 mg Oral Q8H PRN    docusate sodium (COLACE) capsule 100 mg  100 mg Oral BID    doxepin (SINEquan) capsule 25 mg  25 mg Oral HS    enoxaparin (LOVENOX) subcutaneous injection 40 mg  40 mg Subcutaneous Daily    fentaNYL 1250 mcg in sodium chloride 0 9% 125mL drip  100 mcg/hr Intravenous Continuous    fentanyl citrate (PF) 100 MCG/2ML 50 mcg  50 mcg Intravenous Q2H PRN    gabapentin (NEURONTIN) capsule 400 mg  400 mg Oral HS    melatonin tablet 3 mg  3 mg Oral HS    mirtazapine (REMERON) tablet 15 mg  15 mg Oral HS    nicotine (NICODERM CQ) 21 mg/24 hr TD 24 hr patch 21 mg  21 mg Transdermal Daily    oxyCODONE (ROXICODONE) immediate release tablet 20 mg  20 mg Oral Q3H PRN    oxyCODONE (ROXICODONE) IR tablet 15 mg  15 mg Oral Q3H PRN    pantoprazole (PROTONIX) EC tablet 40 mg  40 mg Oral Early Morning    pneumococcal 13-valent conjugate vaccine (PREVNAR-13) IM injection 0 5 mL  0 5 mL Intramuscular Prior to discharge    polyethylene glycol (MIRALAX) packet 17 g  17 g Oral Daily    prochlorperazine (COMPAZINE) tablet 10 mg  10 mg Oral Q6H PRN    senna (SENOKOT) tablet 17 2 mg  2 tablet Oral BID    sodium chloride 0 9 % infusion  75 mL/hr Intravenous Continuous    white petrolatum-mineral oil (EUCERIN,HYDROCERIN) cream   Topical PRN         VTE Pharmacologic Prophylaxis: Enoxaparin (Lovenox)  VTE Mechanical Prophylaxis: sequential compression device    Michel Greenfield DO

## 2017-10-29 NOTE — PROGRESS NOTES
Progress Note - Palliative & Supportive Care  Tangela Parnell  42 y o   male  PPHP 623/[facility] 623-01   MRN: [id number]  Encounter: [id number]     Assessment  Lung cancer IV  Cancer related pain  Diffuse arthalgias/mylagias post immunotherapy with Opdivo many months go with rheumatoid factor (+)  Anxiety    Plan:  Symptom Management:   Pain - continue fentanyl infusion and PRN opioids  Pain acutely worse today which in the past has been related to uncontrolled anxiety or inflammation and has responded well to use of valium and or steriods  Add ATC valium and dexamethasone  Goals of Care:   Full code  Disease directed therapy    History  Current pain location(s): all over, liborio shoulder  Pain Scale:   7  Current Analgesic regimen:  Fentanyl infusion with fentanyl or oxy PRN  PRN Use of Pain Medications: many PRNs per RN   Other history: Pt states that the fentanyl infusion was working but suddenly it is not  ROS: Pertinent items are noted in HPI      Meds  all current active meds have been reviewed and current meds:   Current Facility-Administered Medications   Medication Dose Route Frequency    acetaminophen (TYLENOL) tablet 975 mg  975 mg Oral Q8H [address]    calcium carbonate (TUMS) chewable tablet 500 mg  500 mg Oral BID PRN    dexamethasone (DECADRON) injection 4 mg  4 mg Intravenous BID    diazepam (VALIUM) injection 5 mg  5 mg Intravenous Q8H [address]    diphenhydrAMINE (BENADRYL) tablet 25 mg  25 mg Oral Q8H PRN    docusate sodium (COLACE) capsule 100 mg  100 mg Oral BID    doxepin (SINEquan) capsule 25 mg  25 mg Oral HS    enoxaparin (LOVENOX) subcutaneous injection 40 mg  40 mg Subcutaneous Daily    fentaNYL 1250 mcg in sodium chloride 0 9% 125mL drip  100 mcg/hr Intravenous Continuous    fentanyl citrate (PF) 100 MCG/2ML 50 mcg  50 mcg Intravenous Q2H PRN    gabapentin (NEURONTIN) capsule 400 mg  400 mg Oral HS    melatonin tablet 3 mg  3 mg Oral HS    mirtazapine (REMERON) tablet 15 mg  15 mg Oral HS  nicotine (NICODERM CQ) 21 mg/24 hr TD 24 hr patch 21 mg  21 mg Transdermal Daily    oxyCODONE (ROXICODONE) immediate release tablet 20 mg  20 mg Oral Q3H PRN    oxyCODONE (ROXICODONE) IR tablet 15 mg  15 mg Oral Q3H PRN    pantoprazole (PROTONIX) EC tablet 40 mg  40 mg Oral Early Morning    pneumococcal 13-valent conjugate vaccine (PREVNAR-13) IM injection 0 5 mL  0 5 mL Intramuscular Prior to discharge    polyethylene glycol (MIRALAX) packet 17 g  17 g Oral Daily    prochlorperazine (COMPAZINE) tablet 10 mg  10 mg Oral Q6H PRN    senna (SENOKOT) tablet 17 2 mg  2 tablet Oral BID    sodium chloride 0 9 % infusion  75 mL/hr Intravenous Continuous    white petrolatum-mineral oil (EUCERIN,HYDROCERIN) cream   Topical PRN       Allergies   Allergen Reactions    Zofran [Ondansetron] GI Intolerance       Objective  Physical Exam: /68   Pulse (!) 108   Temp 98 4 °F (36 9 °C)   Resp 18   Ht 6' 3" (1 905 m)   Wt 80 7 kg (178 lb)   SpO2 99%   BMI 22 25 kg/m²   General appearance: alert, appears stated age and cooperative  Lungs: clear to auscultation bilaterally  Chest wall: no tenderness  Abdomen: soft, non-tender; bowel sounds normal; no masses,  no organomegaly  Thin    Lab Results:   I have personally reviewed pertinent labs  , CBC:   Lab Results   Component Value Date    WBC 6 81 10/29/2017    HGB 9 5 (L) 10/29/2017    HCT 29 6 (L) 10/29/2017    MCV 62 (L) 10/29/2017     (H) 10/29/2017    MCH 19 8 (L) 10/29/2017    MCHC 32 1 10/29/2017    RDW 16 9 (H) 10/29/2017    MPV 8 3 (L) 10/29/2017   , CMP:   Lab Results   Component Value Date     (L) 10/29/2017    K 3 7 10/29/2017     10/29/2017    CO2 25 10/29/2017    ANIONGAP 8 10/29/2017    BUN 8 10/29/2017    CREATININE 0 70 10/29/2017    GLUCOSE 121 10/29/2017    CALCIUM 9 1 10/29/2017    EGFR 137 10/29/2017       Counseling / Coordination of Care  Total floor / unit time spent today 20 minutes       Rick Laura MD  Palliative & Supportive Care  Office number: [phone]

## 2017-10-30 LAB
ANION GAP SERPL CALCULATED.3IONS-SCNC: 8 MMOL/L (ref 4–13)
BUN SERPL-MCNC: 7 MG/DL (ref 5–25)
CALCIUM SERPL-MCNC: 9.2 MG/DL (ref 8.3–10.1)
CHLORIDE SERPL-SCNC: 99 MMOL/L (ref 100–108)
CO2 SERPL-SCNC: 26 MMOL/L (ref 21–32)
CREAT SERPL-MCNC: 0.62 MG/DL (ref 0.6–1.3)
ERYTHROCYTE [DISTWIDTH] IN BLOOD BY AUTOMATED COUNT: 17.1 % (ref 11.6–15.1)
GFR SERPL CREATININE-BSD FRML MDRD: 144 ML/MIN/1.73SQ M
GLUCOSE SERPL-MCNC: 97 MG/DL (ref 65–140)
HCT VFR BLD AUTO: 29.9 % (ref 36.5–49.3)
HGB BLD-MCNC: 9.7 G/DL (ref 12–17)
MCH RBC QN AUTO: 19.8 PG (ref 26.8–34.3)
MCHC RBC AUTO-ENTMCNC: 32.4 G/DL (ref 31.4–37.4)
MCV RBC AUTO: 61 FL (ref 82–98)
PLATELET # BLD AUTO: 506 THOUSANDS/UL (ref 149–390)
PMV BLD AUTO: 8.9 FL (ref 8.9–12.7)
POTASSIUM SERPL-SCNC: 4.1 MMOL/L (ref 3.5–5.3)
RBC # BLD AUTO: 4.91 MILLION/UL (ref 3.88–5.62)
SODIUM SERPL-SCNC: 133 MMOL/L (ref 136–145)
WBC # BLD AUTO: 7.3 THOUSAND/UL (ref 4.31–10.16)

## 2017-10-30 PROCEDURE — 80048 BASIC METABOLIC PNL TOTAL CA: CPT | Performed by: INTERNAL MEDICINE

## 2017-10-30 PROCEDURE — 85027 COMPLETE CBC AUTOMATED: CPT | Performed by: INTERNAL MEDICINE

## 2017-10-30 RX ORDER — HEPARIN SODIUM 5000 [USP'U]/ML
5000 INJECTION, SOLUTION INTRAVENOUS; SUBCUTANEOUS EVERY 8 HOURS SCHEDULED
Status: DISCONTINUED | OUTPATIENT
Start: 2017-10-30 | End: 2017-11-04

## 2017-10-30 RX ORDER — DIAZEPAM 5 MG/1
5 TABLET ORAL EVERY 8 HOURS
Status: DISCONTINUED | OUTPATIENT
Start: 2017-10-30 | End: 2017-11-05

## 2017-10-30 RX ORDER — DRONABINOL 2.5 MG/1
2.5 CAPSULE ORAL
Status: DISCONTINUED | OUTPATIENT
Start: 2017-10-30 | End: 2017-11-05

## 2017-10-30 RX ADMIN — MELATONIN TAB 3 MG 3 MG: 3 TAB at 21:46

## 2017-10-30 RX ADMIN — DRONABINOL 2.5 MG: 2.5 CAPSULE ORAL at 16:09

## 2017-10-30 RX ADMIN — SODIUM CHLORIDE 75 ML/HR: 0.9 INJECTION, SOLUTION INTRAVENOUS at 14:03

## 2017-10-30 RX ADMIN — OXYCODONE HYDROCHLORIDE 20 MG: 10 TABLET ORAL at 03:42

## 2017-10-30 RX ADMIN — MIRTAZAPINE 15 MG: 15 TABLET, FILM COATED ORAL at 00:20

## 2017-10-30 RX ADMIN — SENNOSIDES 17.2 MG: 8.6 TABLET, FILM COATED ORAL at 09:05

## 2017-10-30 RX ADMIN — FENTANYL CITRATE 100 MCG/HR: at 19:27

## 2017-10-30 RX ADMIN — DOXEPIN HYDROCHLORIDE 25 MG: 25 CAPSULE ORAL at 00:22

## 2017-10-30 RX ADMIN — ACETAMINOPHEN 975 MG: 325 TABLET, FILM COATED ORAL at 06:11

## 2017-10-30 RX ADMIN — DIAZEPAM 5 MG: 5 INJECTION, SOLUTION INTRAMUSCULAR; INTRAVENOUS at 06:12

## 2017-10-30 RX ADMIN — MIRTAZAPINE 15 MG: 15 TABLET, FILM COATED ORAL at 21:46

## 2017-10-30 RX ADMIN — DEXAMETHASONE SODIUM PHOSPHATE 4 MG: 4 INJECTION, SOLUTION INTRAMUSCULAR; INTRAVENOUS at 09:08

## 2017-10-30 RX ADMIN — OXYCODONE HYDROCHLORIDE 20 MG: 10 TABLET ORAL at 22:36

## 2017-10-30 RX ADMIN — DIAZEPAM 5 MG: 5 TABLET ORAL at 21:45

## 2017-10-30 RX ADMIN — DOCUSATE SODIUM 100 MG: 100 CAPSULE, LIQUID FILLED ORAL at 17:59

## 2017-10-30 RX ADMIN — FENTANYL CITRATE 100 MCG/HR: at 06:22

## 2017-10-30 RX ADMIN — HEPARIN SODIUM 5000 UNITS: 5000 INJECTION, SOLUTION INTRAVENOUS; SUBCUTANEOUS at 21:46

## 2017-10-30 RX ADMIN — PANTOPRAZOLE SODIUM 40 MG: 40 TABLET, DELAYED RELEASE ORAL at 06:11

## 2017-10-30 RX ADMIN — OXYCODONE HYDROCHLORIDE 20 MG: 10 TABLET ORAL at 16:08

## 2017-10-30 RX ADMIN — ACETAMINOPHEN 975 MG: 325 TABLET, FILM COATED ORAL at 21:46

## 2017-10-30 RX ADMIN — OXYCODONE HYDROCHLORIDE 20 MG: 10 TABLET ORAL at 06:21

## 2017-10-30 RX ADMIN — ENOXAPARIN SODIUM 40 MG: 40 INJECTION SUBCUTANEOUS at 09:03

## 2017-10-30 RX ADMIN — POLYETHYLENE GLYCOL 3350 17 G: 17 POWDER, FOR SOLUTION ORAL at 09:06

## 2017-10-30 RX ADMIN — NICOTINE 21 MG: 21 PATCH, EXTENDED RELEASE TRANSDERMAL at 09:07

## 2017-10-30 RX ADMIN — SENNOSIDES 17.2 MG: 8.6 TABLET, FILM COATED ORAL at 17:59

## 2017-10-30 RX ADMIN — OXYCODONE HYDROCHLORIDE 20 MG: 10 TABLET ORAL at 19:26

## 2017-10-30 RX ADMIN — DOXEPIN HYDROCHLORIDE 25 MG: 25 CAPSULE ORAL at 21:47

## 2017-10-30 RX ADMIN — DEXAMETHASONE SODIUM PHOSPHATE 4 MG: 4 INJECTION, SOLUTION INTRAMUSCULAR; INTRAVENOUS at 17:59

## 2017-10-30 RX ADMIN — OXYCODONE HYDROCHLORIDE 20 MG: 10 TABLET ORAL at 09:30

## 2017-10-30 RX ADMIN — Medication 500 MG: at 00:21

## 2017-10-30 RX ADMIN — DIAZEPAM 5 MG: 5 INJECTION, SOLUTION INTRAMUSCULAR; INTRAVENOUS at 14:03

## 2017-10-30 RX ADMIN — SODIUM CHLORIDE 75 ML/HR: 0.9 INJECTION, SOLUTION INTRAVENOUS at 00:20

## 2017-10-30 RX ADMIN — GABAPENTIN 400 MG: 400 CAPSULE ORAL at 21:46

## 2017-10-30 RX ADMIN — Medication 500 MG: at 06:20

## 2017-10-30 RX ADMIN — DOCUSATE SODIUM 100 MG: 100 CAPSULE, LIQUID FILLED ORAL at 09:08

## 2017-10-30 RX ADMIN — OXYCODONE HYDROCHLORIDE 20 MG: 10 TABLET ORAL at 12:30

## 2017-10-30 RX ADMIN — ACETAMINOPHEN 975 MG: 325 TABLET, FILM COATED ORAL at 14:03

## 2017-10-30 RX ADMIN — OXYCODONE HYDROCHLORIDE 20 MG: 10 TABLET ORAL at 00:01

## 2017-10-30 NOTE — PROGRESS NOTES
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)    Nausea    Neuropathic pain syndrome (non-herpetic)    Under care of palliative care physician    Abdominal pain    Back pain    Chest pain      Stage IV adenocarcinoma of lung  Patient diagnosed approximately 4 years ago  Morenita Braggmeghann received chemotherapy and immunotherapy as an outpatient   No treatment this year   Sees Dr Skyla Cates as an outpatient   CT PE study showed worsening tumor burden  · Dx 4 yrs ago, CT PE with worsening tumor burden, large right upper lobe mass with compression of the hilum and increased right middle lobe consolidation, possible tumor versus postobstructive atelectasis also right hilar adenopathy, new 5 cm left apical noduleWill obtain MRI of the brain and spine to assess for metastatic disease  · Monitor blood counts and serum electrolytes  · Liver bx with IR     Adrenal gland/liver metastases  · Patient has extensive tumor burden   Follow-up repeat imaging to better assess tumor burden      Pain of metastatic malignancy with continuous opioid dependence  Patient reports improvement with this pain although he still is not adequately controlled  · Continue fentanyl drip   · Oxycodone IR 15-20 mg p o  q 3 p r n  · Gabapentin 400mg PO Q HS  · Fentanyl  IV Q2 PRN breakthrough   · Continue bowel regimen     Nausea/vomiting  · Patient states he has had a bad reaction to Zofran in the past  · Continue Phenergan p r n         Urticaria   · Improved, possibly related to tracer in bone scan  · Continue p r n   Benadryl     Anxiety/Depression  · Patient continues to report anxiety on current regimen  · Continue lorazepam 0 5 mg IV Q for p r n  · Begin Valium 5 mg q 8h scheduled  · Begin dexamethasone 4 mg b i d  scheduled     Insomnia  Likely multifactorial from pain, nausea, and anxiety   Patient states he has tried Ambien before with no benefit  · Continue treatment for medical issues as outlined above  · Add melatonin 3 mg p o  q h s  Disposition:  Continue inpatient care      Subjective:     No events overnight  Patient continues to report he is having trouble with pain regimen  He reports his pain has improved all other still times overnight where he is in pain and he has nothing available  He reports his appetite is not good  He denies any fever, chills, dyspnea  His pleuritic chest pain improved his abdominal pain improved  Vitals: Temp (24hrs), Av 1 °F (37 3 °C), Min:98 4 °F (36 9 °C), Max:100 5 °F (38 1 °C)  Current: Temperature: 98 9 °F (37 2 °C)  Vitals:    10/29/17 1900 10/29/17 2300 10/30/17 0331 10/30/17 0717   BP: 153/89 140/86 120/86 142/75   Pulse: (!) 123 (!) 115 98 (!) 110   Resp: 18 18 18 18   Temp: 100 5 °F (38 1 °C) 99 6 °F (37 6 °C) 98 8 °F (37 1 °C) 98 9 °F (37 2 °C)   TempSrc: Oral Oral Oral Oral   SpO2: 98% 97% 96% 97%   Weight:       Height:        Body mass index is 22 25 kg/m²  I/O last 24 hours: In: 3226 8 [P O :1160;  I V :6 8]  Out: 651 [Urine:651]      Physical Exam: /75   Pulse (!) 110   Temp 98 9 °F (37 2 °C) (Oral)   Resp 18   Ht 6' 3" (1 905 m)   Wt 80 7 kg (178 lb)   SpO2 97%   BMI 22 25 kg/m²   General appearance: alert, appears stated age, cooperative and no distress  Lungs: diminished breath sounds RML and RUL  Heart: regular rate and rhythm, S1, S2 normal, no murmur, click, rub or gallop  Abdomen: soft, non-tender; bowel sounds normal; no masses,  no organomegaly  Extremities: extremities normal, atraumatic, no cyanosis or edema  Neurologic: Grossly normal     Invasive Devices     Central Venous Catheter Line Port A Cath 10/26/17 Right Chest 3 days                          Labs:   Recent Results (from the past 24 hour(s))   CBC and Platelet    Collection Time: 10/29/17 11:08 AM   Result Value Ref Range    WBC 6 81 4 31 - 10 16 Thousand/uL    RBC 4 81 3 88 - 5 62 Million/uL    Hemoglobin 9 5 (L) 12 0 - 17 0 g/dL    Hematocrit 29 6 (L) 36 5 - 49 3 %    MCV 62 (L) 82 - 98 fL    MCH 19 8 (L) 26 8 - 34 3 pg    MCHC 32 1 31 4 - 37 4 g/dL    RDW 16 9 (H) 11 6 - 15 1 %    Platelets 813 (H) 310 - 390 Thousands/uL    MPV 8 3 (L) 8 9 - 12 7 fL   Basic metabolic panel    Collection Time: 10/29/17 11:08 AM   Result Value Ref Range    Sodium 133 (L) 136 - 145 mmol/L    Potassium 3 7 3 5 - 5 3 mmol/L    Chloride 100 100 - 108 mmol/L    CO2 25 21 - 32 mmol/L    Anion Gap 8 4 - 13 mmol/L    BUN 8 5 - 25 mg/dL    Creatinine 0 70 0 60 - 1 30 mg/dL    Glucose 121 65 - 140 mg/dL    Calcium 9 1 8 3 - 10 1 mg/dL    eGFR 137 ml/min/1 73sq m   CBC and Platelet    Collection Time: 10/30/17  6:12 AM   Result Value Ref Range    WBC 7 30 4 31 - 10 16 Thousand/uL    RBC 4 91 3 88 - 5 62 Million/uL    Hemoglobin 9 7 (L) 12 0 - 17 0 g/dL    Hematocrit 29 9 (L) 36 5 - 49 3 %    MCV 61 (L) 82 - 98 fL    MCH 19 8 (L) 26 8 - 34 3 pg    MCHC 32 4 31 4 - 37 4 g/dL    RDW 17 1 (H) 11 6 - 15 1 %    Platelets 461 (H) 424 - 390 Thousands/uL    MPV 8 9 8 9 - 12 7 fL   Basic metabolic panel    Collection Time: 10/30/17  6:12 AM   Result Value Ref Range    Sodium 133 (L) 136 - 145 mmol/L    Potassium 4 1 3 5 - 5 3 mmol/L    Chloride 99 (L) 100 - 108 mmol/L    CO2 26 21 - 32 mmol/L    Anion Gap 8 4 - 13 mmol/L    BUN 7 5 - 25 mg/dL    Creatinine 0 62 0 60 - 1 30 mg/dL    Glucose 97 65 - 140 mg/dL    Calcium 9 2 8 3 - 10 1 mg/dL    eGFR 144 ml/min/1 73sq m       Radiology Results: I have personally reviewed pertinent reports  Other Diagnostic Testing:   I have personally reviewed pertinent reports          Active Meds:   Current Facility-Administered Medications   Medication Dose Route Frequency    acetaminophen (TYLENOL) tablet 975 mg  975 mg Oral Q8H [address]    calcium carbonate (TUMS) chewable tablet 500 mg  500 mg Oral BID PRN    dexamethasone (DECADRON) injection 4 mg  4 mg Intravenous BID    diazepam (VALIUM) injection 5 mg  5 mg Intravenous Q8H [address]    diphenhydrAMINE (BENADRYL) tablet 25 mg  25 mg Oral Q8H PRN    docusate sodium (COLACE) capsule 100 mg  100 mg Oral BID    doxepin (SINEquan) capsule 25 mg  25 mg Oral HS    enoxaparin (LOVENOX) subcutaneous injection 40 mg  40 mg Subcutaneous Daily    fentaNYL 1250 mcg in sodium chloride 0 9% 125mL drip  100 mcg/hr Intravenous Continuous    fentanyl citrate (PF) 100 MCG/2ML 50 mcg  50 mcg Intravenous Q2H PRN    gabapentin (NEURONTIN) capsule 400 mg  400 mg Oral HS    melatonin tablet 3 mg  3 mg Oral HS    mirtazapine (REMERON) tablet 15 mg  15 mg Oral HS    nicotine (NICODERM CQ) 21 mg/24 hr TD 24 hr patch 21 mg  21 mg Transdermal Daily    oxyCODONE (ROXICODONE) immediate release tablet 20 mg  20 mg Oral Q3H PRN    oxyCODONE (ROXICODONE) IR tablet 15 mg  15 mg Oral Q3H PRN    pantoprazole (PROTONIX) EC tablet 40 mg  40 mg Oral Early Morning    pneumococcal 13-valent conjugate vaccine (PREVNAR-13) IM injection 0 5 mL  0 5 mL Intramuscular Prior to discharge    polyethylene glycol (MIRALAX) packet 17 g  17 g Oral Daily    prochlorperazine (COMPAZINE) tablet 10 mg  10 mg Oral Q6H PRN    senna (SENOKOT) tablet 17 2 mg  2 tablet Oral BID    sodium chloride 0 9 % infusion  75 mL/hr Intravenous Continuous    white petrolatum-mineral oil (EUCERIN,HYDROCERIN) cream   Topical PRN         VTE Pharmacologic Prophylaxis: Enoxaparin (Lovenox)  VTE Mechanical Prophylaxis: sequential compression device    William Locke MD

## 2017-10-30 NOTE — PROGRESS NOTES
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)    Nausea    Neuropathic pain syndrome (non-herpetic)    Elevated rheumatoid factor    Under care of palliative care physician    Abdominal pain    Back pain    Chest pain       Plan:  Symptom Management: cancer-related pain, constipation, anxiety, poor appetite, difficulty sleeping, pruritis   - fentanyl infusion at 100mcg/hr - will plan to convert to patch or other long-acting agent    - acetaminophen 975mg q 8h scheduled   - dexamethasone 4mg IV BID   - change diazepam from IV to PO - 5mg PO q 8h scheduled   - start dronabinol 2 5mg BID before lunch and dinner   - gabapentin 400mg q HS   - mirtazapine 15mg q HS   - melatonin 3mg q HS   - doxepin 25mg q HS   - senna 2 tab BID, docusate 100mg BID, Miralax 17g daily    Goals of Care: Life-prolonging    - treatment-focused plan of care with goal of living longer   - level 1 full code    History  Pain today slightly improved although still severe, worst in left and right upper back, improved in RUQ abdomen  Taking PRN oxycodone every 3 hr around the clock with improvement of pain; relief lasts about 2 5 hr  Last BM about 2 days ago, states he normally has BM about 3 times daily  Diazepam has helped anxiety somewhat although "wears off quickly " Normally smokes marijuana to help appetite; has had poor appetite here as has not been able to smoke; states he thinks his pain would improve if his appetite were better  Becomes dyspneic when pain is severe and when ambulating too quickly      - Current Analgesic regimen: Fentanyl infusion, PRN oxycodone IR and fentanyl IVP, dexamethasone  - PRN Use of Pain Medications in past 24h:  8 doses of oxycodone IR, 1 dose of fentanyl IV  - 24 hour Total OME: 975      Meds  all current active meds have been reviewed and current meds:   Current Facility-Administered Medications   Medication Dose Route Frequency    acetaminophen (TYLENOL) tablet 975 mg  975 mg Oral Q8H [address]    calcium carbonate (TUMS) chewable tablet 500 mg  500 mg Oral BID PRN    dexamethasone (DECADRON) injection 4 mg  4 mg Intravenous BID    diazepam (VALIUM) tablet 5 mg  5 mg Oral Q8H    diphenhydrAMINE (BENADRYL) tablet 25 mg  25 mg Oral Q8H PRN    docusate sodium (COLACE) capsule 100 mg  100 mg Oral BID    doxepin (SINEquan) capsule 25 mg  25 mg Oral HS    dronabinol (MARINOL) capsule 2 5 mg  2 5 mg Oral BID before lunch/dinner    fentaNYL 1250 mcg in sodium chloride 0 9% 125mL drip  100 mcg/hr Intravenous Continuous    fentanyl citrate (PF) 100 MCG/2ML 50 mcg  50 mcg Intravenous Q2H PRN    gabapentin (NEURONTIN) capsule 400 mg  400 mg Oral HS    heparin (porcine) subcutaneous injection 5,000 Units  5,000 Units Subcutaneous Q8H [address]    melatonin tablet 3 mg  3 mg Oral HS    mirtazapine (REMERON) tablet 15 mg  15 mg Oral HS    nicotine (NICODERM CQ) 21 mg/24 hr TD 24 hr patch 21 mg  21 mg Transdermal Daily    oxyCODONE (ROXICODONE) immediate release tablet 20 mg  20 mg Oral Q3H PRN    oxyCODONE (ROXICODONE) IR tablet 15 mg  15 mg Oral Q3H PRN    pantoprazole (PROTONIX) EC tablet 40 mg  40 mg Oral Early Morning    pneumococcal 13-valent conjugate vaccine (PREVNAR-13) IM injection 0 5 mL  0 5 mL Intramuscular Prior to discharge    polyethylene glycol (MIRALAX) packet 17 g  17 g Oral Daily    prochlorperazine (COMPAZINE) tablet 10 mg  10 mg Oral Q6H PRN    senna (SENOKOT) tablet 17 2 mg  2 tablet Oral BID    sodium chloride 0 9 % infusion  75 mL/hr Intravenous Continuous    white petrolatum-mineral oil (EUCERIN,HYDROCERIN) cream   Topical PRN       Allergies   Allergen Reactions    Zofran [Ondansetron] GI Intolerance       Objective  Physical Exam   Constitutional: He is oriented to person, place, and time  He is cooperative  No distress  Pulmonary/Chest: Effort normal  No respiratory distress  Neurological: He is alert and oriented to person, place, and time  Skin: Skin is warm and dry  Psychiatric: He has a normal mood and affect  Cognition and memory are normal        Lab Results: I have personally reviewed pertinent labs        Christal Pelayo, [address] [facility]  Office number: [phone]

## 2017-10-31 ENCOUNTER — ANESTHESIA (INPATIENT)
Dept: RADIOLOGY | Facility: HOSPITAL | Age: 37
DRG: 136 | End: 2017-10-31
Payer: COMMERCIAL

## 2017-10-31 ENCOUNTER — APPOINTMENT (INPATIENT)
Dept: RADIOLOGY | Facility: HOSPITAL | Age: 37
DRG: 136 | End: 2017-10-31
Payer: COMMERCIAL

## 2017-10-31 ENCOUNTER — APPOINTMENT (OUTPATIENT)
Dept: RADIOLOGY | Facility: HOSPITAL | Age: 37
DRG: 136 | End: 2017-10-31
Payer: COMMERCIAL

## 2017-10-31 ENCOUNTER — ANESTHESIA EVENT (INPATIENT)
Dept: RADIOLOGY | Facility: HOSPITAL | Age: 37
DRG: 136 | End: 2017-10-31
Payer: COMMERCIAL

## 2017-10-31 LAB — GLUCOSE SERPL-MCNC: 110 MG/DL (ref 65–140)

## 2017-10-31 PROCEDURE — A9585 GADOBUTROL INJECTION: HCPCS | Performed by: INTERNAL MEDICINE

## 2017-10-31 PROCEDURE — 70553 MRI BRAIN STEM W/O & W/DYE: CPT

## 2017-10-31 PROCEDURE — 72156 MRI NECK SPINE W/O & W/DYE: CPT

## 2017-10-31 PROCEDURE — 82948 REAGENT STRIP/BLOOD GLUCOSE: CPT

## 2017-10-31 RX ORDER — PROMETHAZINE HYDROCHLORIDE 25 MG/ML
25 INJECTION, SOLUTION INTRAMUSCULAR; INTRAVENOUS ONCE AS NEEDED
Status: COMPLETED | OUTPATIENT
Start: 2017-10-31 | End: 2017-10-31

## 2017-10-31 RX ORDER — LORAZEPAM 2 MG/ML
2 INJECTION INTRAMUSCULAR ONCE
Status: COMPLETED | OUTPATIENT
Start: 2017-10-31 | End: 2017-10-31

## 2017-10-31 RX ORDER — DIPHENHYDRAMINE HYDROCHLORIDE 50 MG/ML
25 INJECTION INTRAMUSCULAR; INTRAVENOUS ONCE
Status: DISCONTINUED | OUTPATIENT
Start: 2017-10-31 | End: 2017-10-31

## 2017-10-31 RX ORDER — METOCLOPRAMIDE HYDROCHLORIDE 5 MG/ML
10 INJECTION INTRAMUSCULAR; INTRAVENOUS ONCE AS NEEDED
Status: COMPLETED | OUTPATIENT
Start: 2017-10-31 | End: 2017-10-31

## 2017-10-31 RX ORDER — ALBUTEROL SULFATE 2.5 MG/3ML
2.5 SOLUTION RESPIRATORY (INHALATION) ONCE AS NEEDED
Status: DISCONTINUED | OUTPATIENT
Start: 2017-10-31 | End: 2017-10-31 | Stop reason: HOSPADM

## 2017-10-31 RX ORDER — MEPERIDINE HYDROCHLORIDE 25 MG/ML
12.5 INJECTION INTRAMUSCULAR; INTRAVENOUS; SUBCUTANEOUS AS NEEDED
Status: DISCONTINUED | OUTPATIENT
Start: 2017-10-31 | End: 2017-10-31 | Stop reason: HOSPADM

## 2017-10-31 RX ORDER — PROPOFOL 10 MG/ML
INJECTION, EMULSION INTRAVENOUS AS NEEDED
Status: DISCONTINUED | OUTPATIENT
Start: 2017-10-31 | End: 2017-10-31 | Stop reason: SURG

## 2017-10-31 RX ORDER — SODIUM CHLORIDE 9 MG/ML
INJECTION, SOLUTION INTRAVENOUS CONTINUOUS PRN
Status: DISCONTINUED | OUTPATIENT
Start: 2017-10-31 | End: 2017-10-31 | Stop reason: SURG

## 2017-10-31 RX ORDER — DEXAMETHASONE SODIUM PHOSPHATE 4 MG/ML
4 INJECTION, SOLUTION INTRA-ARTICULAR; INTRALESIONAL; INTRAMUSCULAR; INTRAVENOUS; SOFT TISSUE ONCE
Status: COMPLETED | OUTPATIENT
Start: 2017-10-31 | End: 2017-10-31

## 2017-10-31 RX ADMIN — SODIUM CHLORIDE: 0.9 INJECTION, SOLUTION INTRAVENOUS at 11:50

## 2017-10-31 RX ADMIN — ACETAMINOPHEN 975 MG: 325 TABLET, FILM COATED ORAL at 21:04

## 2017-10-31 RX ADMIN — PROCHLORPERAZINE MALEATE 10 MG: 10 TABLET, FILM COATED ORAL at 15:20

## 2017-10-31 RX ADMIN — PROMETHAZINE HYDROCHLORIDE 25 MG: 25 INJECTION INTRAMUSCULAR; INTRAVENOUS at 13:40

## 2017-10-31 RX ADMIN — FENTANYL CITRATE 50 MCG: 50 INJECTION INTRAMUSCULAR; INTRAVENOUS at 09:51

## 2017-10-31 RX ADMIN — METOCLOPRAMIDE 10 MG: 5 INJECTION, SOLUTION INTRAMUSCULAR; INTRAVENOUS at 13:37

## 2017-10-31 RX ADMIN — GADOBUTROL 8 ML: 604.72 INJECTION INTRAVENOUS at 13:16

## 2017-10-31 RX ADMIN — LORAZEPAM 2 MG: 2 INJECTION INTRAMUSCULAR; INTRAVENOUS at 14:10

## 2017-10-31 RX ADMIN — HEPARIN SODIUM 5000 UNITS: 5000 INJECTION, SOLUTION INTRAVENOUS; SUBCUTANEOUS at 21:04

## 2017-10-31 RX ADMIN — SODIUM CHLORIDE 75 ML/HR: 0.9 INJECTION, SOLUTION INTRAVENOUS at 20:47

## 2017-10-31 RX ADMIN — DEXAMETHASONE SODIUM PHOSPHATE 4 MG: 4 INJECTION, SOLUTION INTRAMUSCULAR; INTRAVENOUS at 14:27

## 2017-10-31 RX ADMIN — MIRTAZAPINE 15 MG: 15 TABLET, FILM COATED ORAL at 21:04

## 2017-10-31 RX ADMIN — OXYCODONE HYDROCHLORIDE 20 MG: 10 TABLET ORAL at 20:51

## 2017-10-31 RX ADMIN — ACETAMINOPHEN 975 MG: 325 TABLET, FILM COATED ORAL at 06:38

## 2017-10-31 RX ADMIN — DRONABINOL 2.5 MG: 2.5 CAPSULE ORAL at 11:11

## 2017-10-31 RX ADMIN — FENTANYL CITRATE 50 MCG: 50 INJECTION INTRAMUSCULAR; INTRAVENOUS at 15:14

## 2017-10-31 RX ADMIN — FENTANYL CITRATE 100 MCG/HR: at 17:49

## 2017-10-31 RX ADMIN — OXYCODONE HYDROCHLORIDE 20 MG: 10 TABLET ORAL at 04:34

## 2017-10-31 RX ADMIN — HEPARIN SODIUM 5000 UNITS: 5000 INJECTION, SOLUTION INTRAVENOUS; SUBCUTANEOUS at 15:22

## 2017-10-31 RX ADMIN — Medication 500 MG: at 18:38

## 2017-10-31 RX ADMIN — DIAZEPAM 5 MG: 5 TABLET ORAL at 06:38

## 2017-10-31 RX ADMIN — DOXEPIN HYDROCHLORIDE 25 MG: 25 CAPSULE ORAL at 21:04

## 2017-10-31 RX ADMIN — OXYCODONE HYDROCHLORIDE 20 MG: 10 TABLET ORAL at 07:21

## 2017-10-31 RX ADMIN — MELATONIN TAB 3 MG 3 MG: 3 TAB at 21:04

## 2017-10-31 RX ADMIN — DIAZEPAM 5 MG: 5 TABLET ORAL at 21:04

## 2017-10-31 RX ADMIN — DRONABINOL 2.5 MG: 2.5 CAPSULE ORAL at 17:47

## 2017-10-31 RX ADMIN — OXYCODONE HYDROCHLORIDE 20 MG: 10 TABLET ORAL at 11:11

## 2017-10-31 RX ADMIN — PANTOPRAZOLE SODIUM 40 MG: 40 TABLET, DELAYED RELEASE ORAL at 06:38

## 2017-10-31 RX ADMIN — PROPOFOL 200 MG: 10 INJECTION, EMULSION INTRAVENOUS at 12:01

## 2017-10-31 RX ADMIN — GABAPENTIN 400 MG: 400 CAPSULE ORAL at 21:04

## 2017-10-31 RX ADMIN — OXYCODONE HYDROCHLORIDE 20 MG: 10 TABLET ORAL at 01:45

## 2017-10-31 RX ADMIN — FENTANYL CITRATE 50 MCG: 50 INJECTION INTRAMUSCULAR; INTRAVENOUS at 17:44

## 2017-10-31 RX ADMIN — Medication 500 MG: at 21:13

## 2017-10-31 RX ADMIN — DEXAMETHASONE SODIUM PHOSPHATE 4 MG: 4 INJECTION, SOLUTION INTRAMUSCULAR; INTRAVENOUS at 10:03

## 2017-10-31 RX ADMIN — FENTANYL CITRATE 100 MCG/HR: at 03:30

## 2017-10-31 RX ADMIN — NICOTINE 21 MG: 21 PATCH, EXTENDED RELEASE TRANSDERMAL at 10:02

## 2017-10-31 RX ADMIN — DEXAMETHASONE SODIUM PHOSPHATE 4 MG: 4 INJECTION, SOLUTION INTRAMUSCULAR; INTRAVENOUS at 17:48

## 2017-10-31 RX ADMIN — SODIUM CHLORIDE 75 ML/HR: 0.9 INJECTION, SOLUTION INTRAVENOUS at 03:31

## 2017-10-31 RX ADMIN — OXYCODONE HYDROCHLORIDE 20 MG: 10 TABLET ORAL at 16:07

## 2017-10-31 NOTE — PROGRESS NOTES
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)    Nausea    Neuropathic pain syndrome (non-herpetic)    Under care of palliative care physician    Abdominal pain    Back pain    Chest pain    Stage IV adenocarcinoma of lung  Patient diagnosed approximately 4 years ago  [facility] received chemotherapy and immunotherapy as an outpatient   No treatment this year   Sees Dr Quintanilla Diss as an outpatient   CT PE study showed worsening tumor burden  MRI today and Friday with sedation follow-up for IR biopsy  · Dx 4 yrs ago, CT PE with worsening tumor burden, large right upper lobe mass with compression of the hilum and increased right middle lobe consolidation, possible tumor versus postobstructive atelectasis also right hilar adenopathy, new 5 cm left apical noduleWill obtain MRI of the brain and spine to assess for metastatic disease  · Monitor blood counts and serum electrolytes  · Liver bx with IR     Adrenal gland/liver metastases  · Patient has extensive tumor burden   Follow-up repeat imaging to better assess tumor burden      Pain of metastatic malignancy with continuous opioid dependence  Patient reports improvement with this pain although he still is not adequately controlled  · Continue fentanyl drip   · Oxycodone IR 15-20 mg p o  q 3 p r n     · Gabapentin 400mg PO Q HS  · Fentanyl  IV Q2 PRN breakthrough   · Continue bowel regimen     Nausea/vomiting  · Currently resolved  · Patient states he has had a bad reaction to Zofran in the past  · Continue Phenergan p r n            Anxiety/Depression  · Patient continues to report anxiety on current regimen  · Continue lorazepam 0 5 mg IV Q for p r n  · Begin Valium 5 mg q 8h scheduled PO  · Continue dexamethasone 4 mg b i d  Scheduled  · Continue Marinol 2 5 mg b i d      Insomnia  Likely multifactorial from pain, nausea, and anxiety   Patient states he has tried Ambien before with no benefit  · Continue treatment for medical issues as outlined above  · Add melatonin 3 mg p o  q h s  Disposition:  Continue inpatient care      Subjective:     No acute events overnight  Patient actually reports great improvement with his pain on the current regimen  He reports positive response to the dronabinol as well as the change in diazepam   Patient reports strong appetite and also reported having a bowel movement for the 1st time since admission  Patient's pain is greatly improved as well  Patient for MRI today       Vitals: Temp (24hrs), Av 5 °F (36 9 °C), Min:97 9 °F (36 6 °C), Max:98 8 °F (37 1 °C)  Current: Temperature: 97 9 °F (36 6 °C)  Vitals:    10/30/17 1615 10/30/17 1900 10/30/17 2332 10/31/17 0759   BP: 155/88 117/56 98/59 96/61   Pulse: 102 105 104 103   Resp: 22 18 18 20   Temp: 98 6 °F (37 °C) 98 4 °F (36 9 °C) 98 6 °F (37 °C) 97 9 °F (36 6 °C)   TempSrc: Oral Oral Oral    SpO2: 100% 100% 95% 98%   Weight:       Height:        Body mass index is 22 25 kg/m²  I/O last 24 hours: In: 2311 2 [P O :220;  I V : 2]  Out: 600 [Urine:600]      Physical Exam: BP 96/61   Pulse 103   Temp 97 9 °F (36 6 °C)   Resp 20   Ht 6' 3" (1 905 m)   Wt 80 7 kg (178 lb)   SpO2 98%   BMI 22 25 kg/m²   General appearance: alert, appears stated age, cooperative and no distress  Lungs: clear to auscultation bilaterally  Chest wall: no tenderness  Heart: regular rate and rhythm, S1, S2 normal, no murmur, click, rub or gallop  Abdomen: soft, non-tender; bowel sounds normal; no masses,  no organomegaly  Extremities: extremities normal, atraumatic, no cyanosis or edema  Neurologic: Grossly normal     Invasive Devices     Central Venous Catheter Line            Port A Cath 10/26/17 Right Chest 4 days                          Labs: No results found for this or any previous visit (from the past 24 hour(s))  Radiology Results: I have personally reviewed pertinent reports  Other Diagnostic Testing:   I have personally reviewed pertinent reports          Active Meds:   Current Facility-Administered Medications   Medication Dose Route Frequency    acetaminophen (TYLENOL) tablet 975 mg  975 mg Oral Q8H [facility]    calcium carbonate (TUMS) chewable tablet 500 mg  500 mg Oral BID PRN    dexamethasone (DECADRON) injection 4 mg  4 mg Intravenous BID    diazepam (VALIUM) tablet 5 mg  5 mg Oral Q8H    diphenhydrAMINE (BENADRYL) tablet 25 mg  25 mg Oral Q8H PRN    docusate sodium (COLACE) capsule 100 mg  100 mg Oral BID    doxepin (SINEquan) capsule 25 mg  25 mg Oral HS    dronabinol (MARINOL) capsule 2 5 mg  2 5 mg Oral BID before lunch/dinner    fentaNYL 1250 mcg in sodium chloride 0 9% 125mL drip  100 mcg/hr Intravenous Continuous    fentanyl citrate (PF) 100 MCG/2ML 50 mcg  50 mcg Intravenous Q2H PRN    gabapentin (NEURONTIN) capsule 400 mg  400 mg Oral HS    heparin (porcine) subcutaneous injection 5,000 Units  5,000 Units Subcutaneous Q8H [facility]    melatonin tablet 3 mg  3 mg Oral HS    mirtazapine (REMERON) tablet 15 mg  15 mg Oral HS    nicotine (NICODERM CQ) 21 mg/24 hr TD 24 hr patch 21 mg  21 mg Transdermal Daily    oxyCODONE (ROXICODONE) immediate release tablet 20 mg  20 mg Oral Q3H PRN    oxyCODONE (ROXICODONE) IR tablet 15 mg  15 mg Oral Q3H PRN    pantoprazole (PROTONIX) EC tablet 40 mg  40 mg Oral Early Morning    pneumococcal 13-valent conjugate vaccine (PREVNAR-13) IM injection 0 5 mL  0 5 mL Intramuscular Prior to discharge    polyethylene glycol (MIRALAX) packet 17 g  17 g Oral Daily    prochlorperazine (COMPAZINE) tablet 10 mg  10 mg Oral Q6H PRN    senna (SENOKOT) tablet 17 2 mg  2 tablet Oral BID    sodium chloride 0 9 % infusion  75 mL/hr Intravenous Continuous    white petrolatum-mineral oil (EUCERIN,HYDROCERIN) cream   Topical PRN         VTE Pharmacologic Prophylaxis: Enoxaparin (Lovenox)  VTE Mechanical Prophylaxis: sequential compression device    Mauricio Villalpando MD

## 2017-10-31 NOTE — ANESTHESIA PREPROCEDURE EVALUATION
Review of Systems/Medical History  Patient summary reviewed        Cardiovascular  Negative cardio ROS    Pulmonary  Negative pulmonary ROS Smoker cigarette smoker , ,   Comment: Lung cancer     GI/Hepatic  Negative GI/hepatic ROS          Negative  ROS        Endo/Other  Negative endo/other ROS      GYN  Negative gynecology ROS          Hematology  Negative hematology ROS      Musculoskeletal  Negative musculoskeletal ROS        Neurology  Negative neurology ROS      Psychology   Negative psychology ROS Anxiety, Depression ,            Physical Exam    Airway    Mallampati score: I  TM Distance: >3 FB  Neck ROM: full     Dental   No notable dental hx     Cardiovascular  Comment: Negative ROS,     Pulmonary      Other Findings        Anesthesia Plan  ASA Score- 3       Anesthesia Type- general with ASA Monitors  Additional Monitors:   Airway Plan: LMA  Comment: Patient seen and examined, history reviewed  Patient to be done under general anesthesia with LMA and routine monitors  Risks discussed with the patient, consent obtained          Induction- intravenous  Informed Consent- Anesthetic plan and risks discussed with patient  I personally reviewed this patient with the CRNA  Discussed and agreed on the Anesthesia Plan with the CRNA  Wanda Pinto

## 2017-10-31 NOTE — PERIOPERATIVE NURSING NOTE
Dr Colon Laws to bedside  Pt wretching and vomiting small amounts of bile  Given 10mg of Reglan, 25 phenergan, 4 decadron, 2mg Ativan (pt  Requested for anxiety)  [id number]  Pt nausea resolving  Physician aware of all medications pt received and spoke with pt  No Fentanyl to be given in the PACU, jadiel desires pt to return to the floor to restart Fentanyl gtt

## 2017-10-31 NOTE — ANESTHESIA POSTPROCEDURE EVALUATION
Post-Op Assessment Note      CV Status:  Stable    Mental Status:  Alert and awake    Hydration Status:  Euvolemic    PONV Controlled:  Inadequately controlled (Giving Reglan and Phenergan)    Airway Patency:  Patent    Post Op Vitals Reviewed: Yes          Staff: Anesthesiologist, CRNA           BP   106/70   Temp      Pulse  108   Resp   20   SpO2   94

## 2017-10-31 NOTE — ANESTHESIA PREPROCEDURE EVALUATION
Review of Systems/Medical History  Patient summary reviewed  Chart reviewed      Cardiovascular   Pulmonary  Smoker ex-smoker , ,   Comment: Stage IV adenocarcinoma- R lung       GI/Hepatic    GERD well controlled,             Endo/Other    Comment: Malignant neoplasm adrenal gland-   GYN       Hematology   Musculoskeletal       Neurology   Psychology   Anxiety, Depression ,   Comment: Marijuana useChronic opioid dependence

## 2017-11-01 LAB
ANION GAP SERPL CALCULATED.3IONS-SCNC: 8 MMOL/L (ref 4–13)
BUN SERPL-MCNC: 15 MG/DL (ref 5–25)
CALCIUM SERPL-MCNC: 9.3 MG/DL (ref 8.3–10.1)
CHLORIDE SERPL-SCNC: 99 MMOL/L (ref 100–108)
CO2 SERPL-SCNC: 27 MMOL/L (ref 21–32)
CREAT SERPL-MCNC: 0.72 MG/DL (ref 0.6–1.3)
ERYTHROCYTE [DISTWIDTH] IN BLOOD BY AUTOMATED COUNT: 17 % (ref 11.6–15.1)
GFR SERPL CREATININE-BSD FRML MDRD: 136 ML/MIN/1.73SQ M
GLUCOSE SERPL-MCNC: 101 MG/DL (ref 65–140)
HCT VFR BLD AUTO: 28.6 % (ref 36.5–49.3)
HGB BLD-MCNC: 9.2 G/DL (ref 12–17)
MCH RBC QN AUTO: 19.5 PG (ref 26.8–34.3)
MCHC RBC AUTO-ENTMCNC: 32.2 G/DL (ref 31.4–37.4)
MCV RBC AUTO: 61 FL (ref 82–98)
PLATELET # BLD AUTO: 503 THOUSANDS/UL (ref 149–390)
PMV BLD AUTO: 8.7 FL (ref 8.9–12.7)
POTASSIUM SERPL-SCNC: 4.1 MMOL/L (ref 3.5–5.3)
RBC # BLD AUTO: 4.73 MILLION/UL (ref 3.88–5.62)
SODIUM SERPL-SCNC: 134 MMOL/L (ref 136–145)
WBC # BLD AUTO: 9.6 THOUSAND/UL (ref 4.31–10.16)

## 2017-11-01 PROCEDURE — 80048 BASIC METABOLIC PNL TOTAL CA: CPT | Performed by: INTERNAL MEDICINE

## 2017-11-01 PROCEDURE — 85027 COMPLETE CBC AUTOMATED: CPT | Performed by: INTERNAL MEDICINE

## 2017-11-01 RX ADMIN — DEXAMETHASONE SODIUM PHOSPHATE 4 MG: 4 INJECTION, SOLUTION INTRAMUSCULAR; INTRAVENOUS at 08:25

## 2017-11-01 RX ADMIN — DIAZEPAM 5 MG: 5 TABLET ORAL at 15:03

## 2017-11-01 RX ADMIN — FENTANYL CITRATE 100 MCG/HR: at 06:44

## 2017-11-01 RX ADMIN — FENTANYL CITRATE 75 MCG/HR: at 19:23

## 2017-11-01 RX ADMIN — HEPARIN SODIUM 5000 UNITS: 5000 INJECTION, SOLUTION INTRAVENOUS; SUBCUTANEOUS at 21:24

## 2017-11-01 RX ADMIN — DRONABINOL 2.5 MG: 2.5 CAPSULE ORAL at 17:03

## 2017-11-01 RX ADMIN — OXYCODONE HYDROCHLORIDE 20 MG: 10 TABLET ORAL at 15:04

## 2017-11-01 RX ADMIN — GABAPENTIN 400 MG: 400 CAPSULE ORAL at 21:22

## 2017-11-01 RX ADMIN — DOCUSATE SODIUM 100 MG: 100 CAPSULE, LIQUID FILLED ORAL at 17:03

## 2017-11-01 RX ADMIN — FENTANYL CITRATE 50 MCG: 50 INJECTION INTRAMUSCULAR; INTRAVENOUS at 20:31

## 2017-11-01 RX ADMIN — SENNOSIDES 17.2 MG: 8.6 TABLET, FILM COATED ORAL at 08:32

## 2017-11-01 RX ADMIN — OXYCODONE HYDROCHLORIDE 20 MG: 10 TABLET ORAL at 04:35

## 2017-11-01 RX ADMIN — DIAZEPAM 5 MG: 5 TABLET ORAL at 06:34

## 2017-11-01 RX ADMIN — MELATONIN TAB 3 MG 3 MG: 3 TAB at 21:22

## 2017-11-01 RX ADMIN — POLYETHYLENE GLYCOL 3350 17 G: 17 POWDER, FOR SOLUTION ORAL at 08:33

## 2017-11-01 RX ADMIN — DEXAMETHASONE SODIUM PHOSPHATE 4 MG: 4 INJECTION, SOLUTION INTRAMUSCULAR; INTRAVENOUS at 17:03

## 2017-11-01 RX ADMIN — DRONABINOL 2.5 MG: 2.5 CAPSULE ORAL at 11:48

## 2017-11-01 RX ADMIN — SODIUM CHLORIDE 75 ML/HR: 0.9 INJECTION, SOLUTION INTRAVENOUS at 21:26

## 2017-11-01 RX ADMIN — OXYCODONE HYDROCHLORIDE 20 MG: 10 TABLET ORAL at 01:18

## 2017-11-01 RX ADMIN — Medication 500 MG: at 08:40

## 2017-11-01 RX ADMIN — PANTOPRAZOLE SODIUM 40 MG: 40 TABLET, DELAYED RELEASE ORAL at 06:33

## 2017-11-01 RX ADMIN — MIRTAZAPINE 15 MG: 15 TABLET, FILM COATED ORAL at 21:22

## 2017-11-01 RX ADMIN — DOXEPIN HYDROCHLORIDE 25 MG: 25 CAPSULE ORAL at 21:22

## 2017-11-01 RX ADMIN — SODIUM CHLORIDE 75 ML/HR: 0.9 INJECTION, SOLUTION INTRAVENOUS at 10:13

## 2017-11-01 RX ADMIN — HEPARIN SODIUM 5000 UNITS: 5000 INJECTION, SOLUTION INTRAVENOUS; SUBCUTANEOUS at 16:20

## 2017-11-01 RX ADMIN — NICOTINE 21 MG: 21 PATCH, EXTENDED RELEASE TRANSDERMAL at 08:25

## 2017-11-01 RX ADMIN — FENTANYL CITRATE 50 MCG: 50 INJECTION INTRAMUSCULAR; INTRAVENOUS at 08:43

## 2017-11-01 RX ADMIN — ACETAMINOPHEN 975 MG: 325 TABLET, FILM COATED ORAL at 15:03

## 2017-11-01 RX ADMIN — DIAZEPAM 5 MG: 5 TABLET ORAL at 21:22

## 2017-11-01 RX ADMIN — OXYCODONE HYDROCHLORIDE 20 MG: 10 TABLET ORAL at 18:23

## 2017-11-01 RX ADMIN — ACETAMINOPHEN 975 MG: 325 TABLET, FILM COATED ORAL at 06:34

## 2017-11-01 RX ADMIN — OXYCODONE HYDROCHLORIDE 20 MG: 10 TABLET ORAL at 11:39

## 2017-11-01 RX ADMIN — ACETAMINOPHEN 975 MG: 325 TABLET, FILM COATED ORAL at 21:22

## 2017-11-01 RX ADMIN — Medication 500 MG: at 03:44

## 2017-11-01 RX ADMIN — OXYCODONE HYDROCHLORIDE 20 MG: 10 TABLET ORAL at 07:52

## 2017-11-01 RX ADMIN — DOCUSATE SODIUM 100 MG: 100 CAPSULE, LIQUID FILLED ORAL at 08:32

## 2017-11-01 RX ADMIN — SENNOSIDES 17.2 MG: 8.6 TABLET, FILM COATED ORAL at 17:02

## 2017-11-01 RX ADMIN — OXYCODONE HYDROCHLORIDE 20 MG: 10 TABLET ORAL at 21:23

## 2017-11-01 RX ADMIN — HEPARIN SODIUM 5000 UNITS: 5000 INJECTION, SOLUTION INTRAVENOUS; SUBCUTANEOUS at 06:34

## 2017-11-01 NOTE — CASE MANAGEMENT
Continued Stay Review    Date: 10/31/2017    Vital Signs: Temp (24hrs), Av 5 °F (36 9 °C), Min:97 9 °F (36 6 °C), Max:98 8 °F (37 1 °C)  Current: Temperature: 97 9 °F (36 6 °C)  Vitals          Vitals:     10/30/17 1615 10/30/17 1900 10/30/17 2332 10/31/17 0759   BP: 155/88 117/56 98/59 96/61   Pulse: 102 105 104 103   Resp: 22 18 18 20   Temp: 98 6 °F (37 °C) 98 4 °F (36 9 °C) 98 6 °F (37 °C) 97 9 °F (36 6 °C)   TempSrc: Oral Oral Oral     SpO2: 100% 100% 95% 98%   Weight:           Height:               Body mass index is 22 25 kg/m²        Medications:   Scheduled Meds:   acetaminophen 975 mg Oral Q8H [facility]   dexamethasone 4 mg Intravenous BID   diazepam 5 mg Oral Q8H   docusate sodium 100 mg Oral BID   doxepin 25 mg Oral HS   dronabinol 2 5 mg Oral BID before lunch/dinner   gabapentin 400 mg Oral HS   heparin (porcine) 5,000 Units Subcutaneous Q8H [facility]   melatonin 3 mg Oral HS   mirtazapine 15 mg Oral HS   nicotine 21 mg Transdermal Daily   pantoprazole 40 mg Oral Early Morning   polyethylene glycol 17 g Oral Daily   senna 2 tablet Oral BID     Continuous Infusions:   fentaNYL 100 mcg/hr Last Rate: 100 mcg/hr (17 0644)   sodium chloride 75 mL/hr Last Rate: 75 mL/hr (10/31/17 2047)     PRN Meds: calcium carbonate po 10/30 x 2 - 10/31 x 2 - 11/1 x 2     diphenhydrAMINE    fentanyl citrate (PF) 50 mcg - 10/31 x 3 -  11/1 x1    oxyCODONE 20 mg po 10/30 x 8 - 10/31 x 6 - 10/31 x 3     oxyCODONE    pneumococcal 13-valent conjugate vaccine    Prochlorperazine po 10/31 x 1    white petrolatum-mineral oil   Phenergan iv 10/31 x1     Nursing orders - VS q 4 - up and OOB as tolerated - Sequential compression device to le's - Diet regular house     Abnormal Labs/Diagnostic Results:    Ref Range & Units 17 0619 10/30/17 0612   Sodium 136 - 145 mmol/L 134   133     Potassium 3 5 - 5 3 mmol/L 4 1  4 1    Chloride 100 - 108 mmol/L 99   99     CO2 21 - 32 mmol/L 27  26    Anion Gap 4 - 13 mmol/L 8  8    BUN 5 - 25 mg/dL 15  7    Creatinine 0 60 - 1 30 mg/dL 0 72  0 62CM    Glucose 65 - 140 mg/dL 101  97CM    Calcium 8 3 - 10 1 mg/dL 9 3  9 2    eGFR ml/min/1 73sq m 136  144             Ref Range & Units 11/1/17 0619 10/30/17 0612   WBC 4 31 - 10 16 Thousand/uL 9 60  7 30    RBC 3 88 - 5 62 Million/uL 4 73  4 91    Hemoglobin 12 0 - 17 0 g/dL 9 2   9 7     Hematocrit 36 5 - 49 3 % 28 6   29 9     MCV 82 - 98 fL 61   61     MCH 26 8 - 34 3 pg 19 5   19 8     MCHC 31 4 - 37 4 g/dL 32 2  32 4    RDW 11 6 - 15 1 % 17 0   17 1     Platelets 370 - 277 Thousands/uL 503   506     MPV   9 - 12 7 fL 8 7   8 9            MRI Brain 10/31 - normal - no acute   MRI Cervical Spine -  10/31 - no indication of metastatic disease   NM Bone scan  Whole body - 10/27 -  No scintigraphic evidence of osseous metastasis    Age/Sex: 42 y o  male     Assessment/Plan:   Stage IV adenocarcinoma of lung  Patient diagnosed approximately 4 years ago  [facility] received chemotherapy and immunotherapy as an outpatient   No treatment this year   Sees Dr Carlos Alberto Paul as an outpatient   CT PE study showed worsening tumor burden  MRI today and Friday with sedation follow-up for IR biopsy  · Dx 4 yrs ago, CT PE with worsening tumor burden, large right upper lobe mass with compression of the hilum and increased right middle lobe consolidation, possible tumor versus postobstructive atelectasis also right hilar adenopathy, new 5 cm left apical noduleWill obtain MRI of the brain and spine to assess for metastatic disease  · Monitor blood counts and serum electrolytes  · Liver bx with IR     Adrenal gland/liver metastases  · Patient has extensive tumor burden   Follow-up repeat imaging to better assess tumor burden      Pain of metastatic malignancy with continuous opioid dependence  Patient reports improvement with this pain although he still is not adequately controlled  · Continue fentanyl drip   · Oxycodone IR 15-20 mg p o  q 3 p r n     · Gabapentin 400mg PO Q HS  · Fentanyl  IV Q2 PRN breakthrough   · Continue bowel regimen     Nausea/vomiting  · Currently resolved  · Patient states he has had a bad reaction to Zofran in the past  · Continue Phenergan p r n            Anxiety/Depression  · Patient continues to report anxiety on current regimen  · Continue lorazepam 0 5 mg IV Q for p r n  · Begin Valium 5 mg q 8h scheduled PO  · Continue dexamethasone 4 mg b i d  Scheduled  · Continue Marinol 2 5 mg b i d      Insomnia  Likely multifactorial from pain, nausea, and anxiety   Patient states he has tried Ambien before with no benefit  · Continue treatment for medical issues as outlined above  · Add melatonin 3 mg p o  q h s        Discharge Plan: Pt lives with his wife in a 2 story home in [facility], [place] [address]  [address]  2nd floor bedroom w option to use 1st floor guest bedroom  Bathroom on each level  Pt is independent, unemployed and does not drive; his wife drives for him  Pt ambulates without an assist device but requests script for [facility] and a shower chair  Pt uses a stall shower  No hx of VNA or IP rehab      11/1 note - Stage IV adenocarcinoma of lung  Patient diagnosed approximately 4 years ago  [facility] received chemotherapy and immunotherapy as an outpatient   No treatment this year   Sees Dr Gregory Cushing as an outpatient   CT PE study showed worsening tumor burden    MRI of brain and C spine overnight showed no mets   MRI Thursday for T and L spine follow-up for IR biopsy on Friday - Patient may not want sedation for MRI as he had bad reactions last night  · Dx 4 yrs ago, CT PE with worsening tumor burden, large right upper lobe mass with compression of the hilum and increased right middle lobe consolidation, possible tumor versus postobstructive atelectasis also right hilar adenopathy, new 5 cm left apical noduleWill obtain MRI of the brain and spine to assess for metastatic disease  · Monitor blood counts and serum electrolytes  · Liver bx with IR     Adrenal gland/liver metastases  · Patient has extensive tumor burden   Follow-up repeat imaging to better assess tumor burden      Pain of metastatic malignancy with continuous opioid dependence  Patient reports improvement with this pain although he still is not adequately controlled  · Continue fentanyl drip   · Oxycodone IR 15-20 mg p o  q 3 p r n     · Gabapentin 400mg PO Q HS  · Fentanyl  IV Q2 PRN breakthrough   · Continue bowel regimen     Nausea/vomiting  · Continue Phenergan p r n

## 2017-11-01 NOTE — PROGRESS NOTES
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)    Nausea    Neuropathic pain syndrome (non-herpetic)    Elevated rheumatoid factor    Under care of palliative care physician    Abdominal pain    Back pain    Chest pain       Plan:  Symptom Management: cancer-related pain, constipation, anxiety, poor appetite, difficulty sleeping   - decrease fentanyl infusion to 75mcg/hr - will plan to convert back to patch prior to discharge   - acetaminophen 975mg q 8h scheduled   - dexamethasone 4mg IV BID   - diazepam 5mg PO q 8h scheduled   - dronabinol 2 5mg BID before lunch and dinner   - gabapentin 400mg q HS   - mirtazapine 15mg q HS   - melatonin 3mg q HS   - doxepin 25mg q HS   - senna 2 tab BID, docusate 100mg BID, Miralax 17g daily    Goals of Care: Life-prolonging    - treatment-focused plan of care with goal of living longer   - level 1 full code    History  Yesterday nausea and vomiting after anesthesia for MRI  States he is feeling well today - nausea, appetite, pain improved   Pain of bilateral upper back currently rated at 4/10      - Current Analgesic regimen:  Fentanyl infusion, PRN oxycodone IR and fentanyl IVP, dexamethasone  - PRN Use of Pain Medications in past 24h:  6 doses of oxycodone IR, 3 doses of IV fentanyl  - 24 hour Total OME: 945      Meds  all current active meds have been reviewed and current meds:   Current Facility-Administered Medications   Medication Dose Route Frequency    acetaminophen (TYLENOL) tablet 975 mg  975 mg Oral Q8H [address]    calcium carbonate (TUMS) chewable tablet 500 mg  500 mg Oral BID PRN    dexamethasone (DECADRON) injection 4 mg  4 mg Intravenous BID    diazepam (VALIUM) tablet 5 mg  5 mg Oral Q8H    diphenhydrAMINE (BENADRYL) tablet 25 mg  25 mg Oral Q8H PRN    docusate sodium (COLACE) capsule 100 mg  100 mg Oral BID    doxepin (SINEquan) capsule 25 mg  25 mg Oral HS    dronabinol (MARINOL) capsule 2 5 mg  2 5 mg Oral BID before lunch/dinner    fentaNYL 1250 mcg in sodium chloride 0 9% 125mL drip  100 mcg/hr Intravenous Continuous    fentanyl citrate (PF) 100 MCG/2ML 50 mcg  50 mcg Intravenous Q2H PRN    gabapentin (NEURONTIN) capsule 400 mg  400 mg Oral HS    heparin (porcine) subcutaneous injection 5,000 Units  5,000 Units Subcutaneous Q8H [facility]    melatonin tablet 3 mg  3 mg Oral HS    mirtazapine (REMERON) tablet 15 mg  15 mg Oral HS    nicotine (NICODERM CQ) 21 mg/24 hr TD 24 hr patch 21 mg  21 mg Transdermal Daily    oxyCODONE (ROXICODONE) immediate release tablet 20 mg  20 mg Oral Q3H PRN    oxyCODONE (ROXICODONE) IR tablet 15 mg  15 mg Oral Q3H PRN    pantoprazole (PROTONIX) EC tablet 40 mg  40 mg Oral Early Morning    pneumococcal 13-valent conjugate vaccine (PREVNAR-13) IM injection 0 5 mL  0 5 mL Intramuscular Prior to discharge    polyethylene glycol (MIRALAX) packet 17 g  17 g Oral Daily    prochlorperazine (COMPAZINE) tablet 10 mg  10 mg Oral Q6H PRN    senna (SENOKOT) tablet 17 2 mg  2 tablet Oral BID    sodium chloride 0 9 % infusion  75 mL/hr Intravenous Continuous    white petrolatum-mineral oil (EUCERIN,HYDROCERIN) cream   Topical PRN       Allergies   Allergen Reactions    Zofran [Ondansetron] GI Intolerance       Objective  Physical Exam   Constitutional: He is oriented to person, place, and time  He is cooperative  No distress  Pulmonary/Chest: Effort normal  No respiratory distress  Neurological: He is alert and oriented to person, place, and time  Skin: Skin is warm and dry  Psychiatric: He has a normal mood and affect   Cognition and memory are normal        Lab Results: I have personally reviewed pertinent labs        Michelle Rosario, [address] [facility]  Office number: [phone]

## 2017-11-01 NOTE — PROGRESS NOTES
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)    Nausea    Neuropathic pain syndrome (non-herpetic)    Under care of palliative care physician    Abdominal pain    Back pain    Chest pain    Stage IV adenocarcinoma of lung  Patient diagnosed approximately 4 years ago  Alanis Anderson received chemotherapy and immunotherapy as an outpatient   No treatment this year   Sees Dr Sharee Gaviria as an outpatient   CT PE study showed worsening tumor burden  MRI of brain and C spine overnight showed no mets  MRI Thursday for T and L spine follow-up for IR biopsy on Friday - Patient may not want sedation for MRI as he had bad reactions last night  · Dx 4 yrs ago, CT PE with worsening tumor burden, large right upper lobe mass with compression of the hilum and increased right middle lobe consolidation, possible tumor versus postobstructive atelectasis also right hilar adenopathy, new 5 cm left apical noduleWill obtain MRI of the brain and spine to assess for metastatic disease  · Monitor blood counts and serum electrolytes  · Liver bx with IR     Adrenal gland/liver metastases  · Patient has extensive tumor burden   Follow-up repeat imaging to better assess tumor burden      Pain of metastatic malignancy with continuous opioid dependence  Patient reports improvement with this pain although he still is not adequately controlled  · Continue fentanyl drip   · Oxycodone IR 15-20 mg p o  q 3 p r n     · Gabapentin 400mg PO Q HS  · Fentanyl  IV Q2 PRN breakthrough   · Continue bowel regimen     Nausea/vomiting  · Continue Phenergan p r n            Anxiety/Depression  · Continue lorazepam 0 5 mg IV Q for p r n  · Begin Valium 5 mg q 8h scheduled PO  · Continue dexamethasone 4 mg b i d  Scheduled  · Continue Marinol 2 5 mg b i d  Anemia  - Hgb 9 2  · Monitor CBC  · May be due to AOCD    Insomnia  Likely multifactorial from pain, nausea, and anxiety   Patient states he has tried Ambien before with no benefit  · Continue treatment for medical issues as outlined above  · Add melatonin 3 mg p o  Q h s  Disposition:  Continue inpatient care      Subjective:   Patient reports he had bad reaction to anaesthesia overnight while getting MRI with nausea and vomiting  Would be willing to do MRI without sedation  Per nursing staff did not have any acute events overnight  Vitals: Temp (24hrs), Av 6 °F (37 °C), Min:98 5 °F (36 9 °C), Max:98 6 °F (37 °C)  Current: Temperature: 98 5 °F (36 9 °C)  Vitals:    10/31/17 1445 10/31/17 1534 10/31/17 2342 17 0755   BP: 126/89 113/70 113/71 123/82   Pulse: 102 105 95 86   Resp: (!) 24 18 18 20   Temp: 98 6 °F (37 °C) 98 6 °F (37 °C) 98 6 °F (37 °C) 98 5 °F (36 9 °C)   TempSrc:  Oral Oral    SpO2: 96% 97% 97% 98%   Weight:       Height:        Body mass index is 22 25 kg/m²  I/O last 24 hours:   In: 1756 3 [I V :1756 3]  Out: 200 [Urine:200]      Physical Exam: General appearance: alert, appears stated age and cooperative  Lungs: clear to auscultation bilaterally  Heart: regular rate and rhythm, S1, S2 normal, no murmur, click, rub or gallop  Extremities: extremities normal, atraumatic, no cyanosis or edema  Neurologic: Grossly normal     Invasive Devices     Central Venous Catheter Line            Port A Cath 10/26/17 Right Chest 5 days                          Labs:   Recent Results (from the past 24 hour(s))   Fingerstick Glucose (POCT)    Collection Time: 10/31/17  2:18 PM   Result Value Ref Range    POC Glucose 110 65 - 140 mg/dl   CBC and Platelet    Collection Time: 17  6:19 AM   Result Value Ref Range    WBC 9 60 4 31 - 10 16 Thousand/uL    RBC 4 73 3 88 - 5 62 Million/uL    Hemoglobin 9 2 (L) 12 0 - 17 0 g/dL    Hematocrit 28 6 (L) 36 5 - 49 3 %    MCV 61 (L) 82 - 98 fL    MCH 19 5 (L) 26 8 - 34 3 pg    MCHC 32 2 31 4 - 37 4 g/dL    RDW 17 0 (H) 11 6 - 15 1 %    Platelets 957 (H) 891 - 390 Thousands/uL    MPV 8 7 (L) 8 9 - 12 7 fL   Basic metabolic panel    Collection Time: 11/01/17  6:19 AM   Result Value Ref Range    Sodium 134 (L) 136 - 145 mmol/L    Potassium 4 1 3 5 - 5 3 mmol/L    Chloride 99 (L) 100 - 108 mmol/L    CO2 27 21 - 32 mmol/L    Anion Gap 8 4 - 13 mmol/L    BUN 15 5 - 25 mg/dL    Creatinine 0 72 0 60 - 1 30 mg/dL    Glucose 101 65 - 140 mg/dL    Calcium 9 3 8 3 - 10 1 mg/dL    eGFR 136 ml/min/1 73sq m       Radiology Results: I have personally reviewed pertinent reports  Other Diagnostic Testing:   I have personally reviewed pertinent reports          Active Meds:   Current Facility-Administered Medications   Medication Dose Route Frequency    acetaminophen (TYLENOL) tablet 975 mg  975 mg Oral Q8H [address]    calcium carbonate (TUMS) chewable tablet 500 mg  500 mg Oral BID PRN    dexamethasone (DECADRON) injection 4 mg  4 mg Intravenous BID    diazepam (VALIUM) tablet 5 mg  5 mg Oral Q8H    diphenhydrAMINE (BENADRYL) tablet 25 mg  25 mg Oral Q8H PRN    docusate sodium (COLACE) capsule 100 mg  100 mg Oral BID    doxepin (SINEquan) capsule 25 mg  25 mg Oral HS    dronabinol (MARINOL) capsule 2 5 mg  2 5 mg Oral BID before lunch/dinner    fentaNYL 1250 mcg in sodium chloride 0 9% 125mL drip  100 mcg/hr Intravenous Continuous    fentanyl citrate (PF) 100 MCG/2ML 50 mcg  50 mcg Intravenous Q2H PRN    gabapentin (NEURONTIN) capsule 400 mg  400 mg Oral HS    heparin (porcine) subcutaneous injection 5,000 Units  5,000 Units Subcutaneous Q8H [address]    melatonin tablet 3 mg  3 mg Oral HS    mirtazapine (REMERON) tablet 15 mg  15 mg Oral HS    nicotine (NICODERM CQ) 21 mg/24 hr TD 24 hr patch 21 mg  21 mg Transdermal Daily    oxyCODONE (ROXICODONE) immediate release tablet 20 mg  20 mg Oral Q3H PRN    oxyCODONE (ROXICODONE) IR tablet 15 mg  15 mg Oral Q3H PRN    pantoprazole (PROTONIX) EC tablet 40 mg  40 mg Oral Early Morning    pneumococcal 13-valent conjugate vaccine (PREVNAR-13) IM injection 0 5 mL  0 5 mL Intramuscular Prior to discharge    polyethylene glycol (MIRALAX) packet 17 g  17 g Oral Daily    prochlorperazine (COMPAZINE) tablet 10 mg  10 mg Oral Q6H PRN    senna (SENOKOT) tablet 17 2 mg  2 tablet Oral BID    sodium chloride 0 9 % infusion  75 mL/hr Intravenous Continuous    white petrolatum-mineral oil (EUCERIN,HYDROCERIN) cream   Topical PRN         VTE Pharmacologic Prophylaxis: Heparin  VTE Mechanical Prophylaxis: sequential compression device    Irene Gtz DO

## 2017-11-02 ENCOUNTER — ANESTHESIA EVENT (INPATIENT)
Dept: RADIOLOGY | Facility: HOSPITAL | Age: 37
DRG: 136 | End: 2017-11-02
Payer: COMMERCIAL

## 2017-11-02 DIAGNOSIS — R60.9 EDEMA: ICD-10-CM

## 2017-11-02 LAB
ANION GAP SERPL CALCULATED.3IONS-SCNC: 8 MMOL/L (ref 4–13)
BUN SERPL-MCNC: 13 MG/DL (ref 5–25)
CALCIUM SERPL-MCNC: 9.3 MG/DL (ref 8.3–10.1)
CHLORIDE SERPL-SCNC: 99 MMOL/L (ref 100–108)
CO2 SERPL-SCNC: 27 MMOL/L (ref 21–32)
CREAT SERPL-MCNC: 0.6 MG/DL (ref 0.6–1.3)
ERYTHROCYTE [DISTWIDTH] IN BLOOD BY AUTOMATED COUNT: 17.1 % (ref 11.6–15.1)
GFR SERPL CREATININE-BSD FRML MDRD: 146 ML/MIN/1.73SQ M
GLUCOSE SERPL-MCNC: 91 MG/DL (ref 65–140)
HCT VFR BLD AUTO: 28.5 % (ref 36.5–49.3)
HGB BLD-MCNC: 9.4 G/DL (ref 12–17)
MCH RBC QN AUTO: 20 PG (ref 26.8–34.3)
MCHC RBC AUTO-ENTMCNC: 33 G/DL (ref 31.4–37.4)
MCV RBC AUTO: 61 FL (ref 82–98)
PLATELET # BLD AUTO: 523 THOUSANDS/UL (ref 149–390)
PMV BLD AUTO: 8.9 FL (ref 8.9–12.7)
POTASSIUM SERPL-SCNC: 3.6 MMOL/L (ref 3.5–5.3)
RBC # BLD AUTO: 4.71 MILLION/UL (ref 3.88–5.62)
SODIUM SERPL-SCNC: 134 MMOL/L (ref 136–145)
WBC # BLD AUTO: 9.31 THOUSAND/UL (ref 4.31–10.16)

## 2017-11-02 PROCEDURE — 80048 BASIC METABOLIC PNL TOTAL CA: CPT | Performed by: INTERNAL MEDICINE

## 2017-11-02 PROCEDURE — 85027 COMPLETE CBC AUTOMATED: CPT | Performed by: INTERNAL MEDICINE

## 2017-11-02 RX ORDER — FENTANYL 75 UG/H
75 PATCH TRANSDERMAL
Status: DISCONTINUED | OUTPATIENT
Start: 2017-11-02 | End: 2017-11-03

## 2017-11-02 RX ORDER — DEXAMETHASONE 4 MG/1
4 TABLET ORAL 2 TIMES DAILY WITH MEALS
Status: DISCONTINUED | OUTPATIENT
Start: 2017-11-02 | End: 2017-11-03

## 2017-11-02 RX ORDER — POTASSIUM CHLORIDE 20 MEQ/1
40 TABLET, EXTENDED RELEASE ORAL ONCE
Status: COMPLETED | OUTPATIENT
Start: 2017-11-02 | End: 2017-11-02

## 2017-11-02 RX ADMIN — OXYCODONE HYDROCHLORIDE 20 MG: 10 TABLET ORAL at 10:20

## 2017-11-02 RX ADMIN — DOCUSATE SODIUM 100 MG: 100 CAPSULE, LIQUID FILLED ORAL at 08:42

## 2017-11-02 RX ADMIN — ACETAMINOPHEN 975 MG: 325 TABLET, FILM COATED ORAL at 21:48

## 2017-11-02 RX ADMIN — SENNOSIDES 17.2 MG: 8.6 TABLET, FILM COATED ORAL at 08:42

## 2017-11-02 RX ADMIN — DOXEPIN HYDROCHLORIDE 25 MG: 25 CAPSULE ORAL at 21:48

## 2017-11-02 RX ADMIN — ACETAMINOPHEN 975 MG: 325 TABLET, FILM COATED ORAL at 13:12

## 2017-11-02 RX ADMIN — DRONABINOL 2.5 MG: 2.5 CAPSULE ORAL at 11:47

## 2017-11-02 RX ADMIN — FENTANYL CITRATE 50 MCG: 50 INJECTION INTRAMUSCULAR; INTRAVENOUS at 20:20

## 2017-11-02 RX ADMIN — DIAZEPAM 5 MG: 5 TABLET ORAL at 13:12

## 2017-11-02 RX ADMIN — DEXAMETHASONE SODIUM PHOSPHATE 4 MG: 4 INJECTION, SOLUTION INTRAMUSCULAR; INTRAVENOUS at 08:42

## 2017-11-02 RX ADMIN — DEXAMETHASONE 4 MG: 4 TABLET ORAL at 13:12

## 2017-11-02 RX ADMIN — DIAZEPAM 5 MG: 5 TABLET ORAL at 06:02

## 2017-11-02 RX ADMIN — DOCUSATE SODIUM 100 MG: 100 CAPSULE, LIQUID FILLED ORAL at 17:35

## 2017-11-02 RX ADMIN — SODIUM CHLORIDE 75 ML/HR: 0.9 INJECTION, SOLUTION INTRAVENOUS at 21:54

## 2017-11-02 RX ADMIN — OXYCODONE HYDROCHLORIDE 20 MG: 10 TABLET ORAL at 22:00

## 2017-11-02 RX ADMIN — DRONABINOL 2.5 MG: 2.5 CAPSULE ORAL at 16:02

## 2017-11-02 RX ADMIN — ACETAMINOPHEN 975 MG: 325 TABLET, FILM COATED ORAL at 06:02

## 2017-11-02 RX ADMIN — HEPARIN SODIUM 5000 UNITS: 5000 INJECTION, SOLUTION INTRAVENOUS; SUBCUTANEOUS at 13:12

## 2017-11-02 RX ADMIN — OXYCODONE HYDROCHLORIDE 20 MG: 10 TABLET ORAL at 13:06

## 2017-11-02 RX ADMIN — GABAPENTIN 400 MG: 400 CAPSULE ORAL at 21:48

## 2017-11-02 RX ADMIN — Medication 500 MG: at 22:02

## 2017-11-02 RX ADMIN — OXYCODONE HYDROCHLORIDE 20 MG: 10 TABLET ORAL at 06:56

## 2017-11-02 RX ADMIN — MELATONIN TAB 3 MG 3 MG: 3 TAB at 21:48

## 2017-11-02 RX ADMIN — HEPARIN SODIUM 5000 UNITS: 5000 INJECTION, SOLUTION INTRAVENOUS; SUBCUTANEOUS at 06:02

## 2017-11-02 RX ADMIN — POTASSIUM CHLORIDE 40 MEQ: 1500 TABLET, EXTENDED RELEASE ORAL at 10:20

## 2017-11-02 RX ADMIN — NICOTINE 21 MG: 21 PATCH, EXTENDED RELEASE TRANSDERMAL at 08:42

## 2017-11-02 RX ADMIN — PANTOPRAZOLE SODIUM 40 MG: 40 TABLET, DELAYED RELEASE ORAL at 06:02

## 2017-11-02 RX ADMIN — FENTANYL 75 MCG: 75 PATCH, EXTENDED RELEASE TRANSDERMAL at 21:45

## 2017-11-02 RX ADMIN — SODIUM CHLORIDE 75 ML/HR: 0.9 INJECTION, SOLUTION INTRAVENOUS at 10:20

## 2017-11-02 RX ADMIN — OXYCODONE HYDROCHLORIDE 20 MG: 10 TABLET ORAL at 00:24

## 2017-11-02 RX ADMIN — OXYCODONE HYDROCHLORIDE 20 MG: 10 TABLET ORAL at 16:02

## 2017-11-02 RX ADMIN — DIAZEPAM 5 MG: 5 TABLET ORAL at 21:49

## 2017-11-02 RX ADMIN — HEPARIN SODIUM 5000 UNITS: 5000 INJECTION, SOLUTION INTRAVENOUS; SUBCUTANEOUS at 21:53

## 2017-11-02 RX ADMIN — OXYCODONE HYDROCHLORIDE 20 MG: 10 TABLET ORAL at 03:26

## 2017-11-02 RX ADMIN — FENTANYL CITRATE 50 MCG: 50 INJECTION INTRAMUSCULAR; INTRAVENOUS at 06:02

## 2017-11-02 RX ADMIN — MIRTAZAPINE 15 MG: 15 TABLET, FILM COATED ORAL at 21:48

## 2017-11-02 RX ADMIN — Medication 500 MG: at 03:28

## 2017-11-02 RX ADMIN — OXYCODONE HYDROCHLORIDE 20 MG: 10 TABLET ORAL at 19:02

## 2017-11-02 RX ADMIN — SENNOSIDES 17.2 MG: 8.6 TABLET, FILM COATED ORAL at 17:35

## 2017-11-02 RX ADMIN — FENTANYL CITRATE 75 MCG/HR: 50 INJECTION, SOLUTION INTRAMUSCULAR; INTRAVENOUS at 12:04

## 2017-11-02 RX ADMIN — POLYETHYLENE GLYCOL 3350 17 G: 17 POWDER, FOR SOLUTION ORAL at 08:42

## 2017-11-02 NOTE — PALLIATIVE CARE CONFERENCE
Pt  [facility] stated [facility] is "doing as best as he can under the circumstances "  His wife and family are very supportive, but he is worried about his wife's emotional state since his admission  Offered to provide support to her  She works as a hospital  here at [employer] and at [address] [place] [address]  Her hours are per jorge so her shifts can vary  Pt  Stated he is comfortable with the pain medication regimen and hopes he can return home soon after his MRI and biopsy  LSW will continue to follow and provide supportive counseling as needed

## 2017-11-02 NOTE — PROGRESS NOTES
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)    Nausea    Neuropathic pain syndrome (non-herpetic)    Elevated rheumatoid factor    Under care of palliative care physician    Abdominal pain    Back pain    Chest pain       Plan:  Symptom Management: cancer-related pain, constipation, anxiety, poor appetite, difficulty sleeping   - continue fentanyl infusion at 75mcg/hr today - at bedtime, will start fentanyl patch, continue drip overnight, decrease rate in morning, then stop drip about 12h after patch is applied  - acetaminophen 975mg q 8h scheduled   - dexamethasone 4mg BID - change to PO   - diazepam 5mg PO q 8h scheduled   - dronabinol 2 5mg BID before lunch and dinner   - gabapentin 400mg q HS   - mirtazapine 15mg q HS   - melatonin 3mg q HS   - doxepin 25mg q HS   - senna 2 tab BID, docusate 100mg BID, Miralax 17g daily    Goals of Care: Life-prolonging    - treatment-focused plan of care with goal of living longer   - level 1 full code    History  Reports he had a rough night and that pain was not as well-controlled  Feels fatigued this morning  Able to eat breakfast without issue   No constipation      - Current Analgesic regimen:  Fentanyl infusion, PRN oxycodone IR and fentanyl IVP, dexamethasone  - PRN Use of Pain Medications in past 24h:  7 doses of oxycodone IR, 3 doses of IV fentanyl  - 24 hour Total OME: 975      Meds  all current active meds have been reviewed and current meds:   Current Facility-Administered Medications   Medication Dose Route Frequency    acetaminophen (TYLENOL) tablet 975 mg  975 mg Oral Q8H [address]    calcium carbonate (TUMS) chewable tablet 500 mg  500 mg Oral BID PRN    dexamethasone (DECADRON) tablet 4 mg  4 mg Oral BID With Meals    diazepam (VALIUM) tablet 5 mg  5 mg Oral Q8H    diphenhydrAMINE (BENADRYL) tablet 25 mg  25 mg Oral Q8H PRN    docusate sodium (COLACE) capsule 100 mg  100 mg Oral BID    doxepin (SINEquan) capsule 25 mg  25 mg Oral HS    dronabinol (MARINOL) capsule 2 5 mg  2 5 mg Oral BID before lunch/dinner    fentaNYL (DURAGESIC) 75 mcg/hr TD 72 hr patch 75 mcg  75 mcg Transdermal Q72H    fentaNYL 1250 mcg in sodium chloride 0 9% 125mL drip  75 mcg/hr Intravenous Continuous    [START ON 11/3/2017] fentaNYL 1250 mcg in sodium chloride 0 9% 125mL drip  50 mcg/hr Intravenous Continuous    fentanyl citrate (PF) 100 MCG/2ML 50 mcg  50 mcg Intravenous Q2H PRN    gabapentin (NEURONTIN) capsule 400 mg  400 mg Oral HS    heparin (porcine) subcutaneous injection 5,000 Units  5,000 Units Subcutaneous Q8H [address]    melatonin tablet 3 mg  3 mg Oral HS    mirtazapine (REMERON) tablet 15 mg  15 mg Oral HS    nicotine (NICODERM CQ) 21 mg/24 hr TD 24 hr patch 21 mg  21 mg Transdermal Daily    oxyCODONE (ROXICODONE) immediate release tablet 20 mg  20 mg Oral Q3H PRN    oxyCODONE (ROXICODONE) IR tablet 15 mg  15 mg Oral Q3H PRN    pantoprazole (PROTONIX) EC tablet 40 mg  40 mg Oral Early Morning    pneumococcal 13-valent conjugate vaccine (PREVNAR-13) IM injection 0 5 mL  0 5 mL Intramuscular Prior to discharge    polyethylene glycol (MIRALAX) packet 17 g  17 g Oral Daily    prochlorperazine (COMPAZINE) tablet 10 mg  10 mg Oral Q6H PRN    senna (SENOKOT) tablet 17 2 mg  2 tablet Oral BID    sodium chloride 0 9 % infusion  75 mL/hr Intravenous Continuous    white petrolatum-mineral oil (EUCERIN,HYDROCERIN) cream   Topical PRN       Allergies   Allergen Reactions    Zofran [Ondansetron] GI Intolerance       Objective  Physical Exam   Constitutional: He is oriented to person, place, and time  He is cooperative  Pulmonary/Chest: Effort normal  No respiratory distress  Neurological: He is alert and oriented to person, place, and time  Skin: Skin is warm and dry  Psychiatric: His mood appears anxious  Cognition and memory are normal        Lab Results: I have personally reviewed pertinent labs        Rashi Patterson, [facility]  Office number: [phone]

## 2017-11-02 NOTE — PROGRESS NOTES
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)    Nausea    Neuropathic pain syndrome (non-herpetic)    Under care of palliative care physician    Abdominal pain    Back pain    Chest pain    Stage IV adenocarcinoma of lung  Patient diagnosed approximately 4 years ago  Janie Latham received chemotherapy and immunotherapy as an outpatient   No treatment this year   Sees Dr Isak Varma as an outpatient   CT PE study showed worsening tumor burden    MRI of brain and C spine overnight showed no mets   MRI tonight for T and L spine follow-up for IR biopsy on Friday   · Dx 4 yrs ago, CT PE with worsening tumor burden, large right upper lobe mass with compression of the hilum and increased right middle lobe consolidation, possible tumor versus postobstructive atelectasis also right hilar adenopathy, new 5 cm left apical noduleWill obtain MRI of the brain and spine to assess for metastatic disease  · Monitor blood counts and serum electrolytes  · Liver bx with IR on Friday     Adrenal gland/liver metastases  · Patient has extensive tumor burden   Follow-up repeat imaging to better assess tumor burden      Pain of metastatic malignancy with continuous opioid dependence  Patient reports improvement with this pain although he still is not adequately controlled  · Continue fentanyl drip, decreased by palliative yesterday to 75mcg/hr - plan to convert back to patch prior to discharge  · Acetaminophen 975mg q8 scheduled  · Oxycodone IR for moderate  · Dexemethasone 4mg IV BID  · Diazepam 5mg PO q8 scheduled  · Gabapentin 400mg PO Q HS  · Fentanyl  IV Q2 PRN breakthrough · Continue bowel regimen  · Will need to have good pain regimen for anticipated discharge on Friday after IR biopsy     Nausea/vomiting  · Continue Phenergan p r n  · Allergic to Zofran           Anxiety/Depression  · Continue lorazepam 0 5 mg IV Q for p r n  · Begin Valium 5 mg q 8h scheduled PO  · Continue dexamethasone 4 mg b i d  Scheduled  · Continue Marinol 2 5 mg b i d      Anemia  - Hgb 9 2  § Monitor CBC  § May be due to AOCD     Insomnia  Likely multifactorial from pain, nausea, and anxiety   Patient states he has tried Ambien before with no benefit  · Continue treatment for medical issues as outlined above  · melatonin 3 mg p o  Q h s  Disposition: Continue inpatient care - plan for discharge on Friday following IR biopsy      Subjective:   Patient reports increased pain today after fentanyl drip ws decreased to 75mcg/hr  He is anxious about upcoming studies  No events overnight per nursing staff  Vitals: Temp (24hrs), Av 2 °F (36 8 °C), Min:97 9 °F (36 6 °C), Max:98 4 °F (36 9 °C)  Current: Temperature: 98 4 °F (36 9 °C)  Vitals:    17 1503 17 2035 17 2325 17 0752   BP: 135/80 139/80 106/60 121/73   Pulse: 83  89 81   Resp: 20  20 21   Temp: 98 2 °F (36 8 °C)  97 9 °F (36 6 °C) 98 4 °F (36 9 °C)   TempSrc:   Oral    SpO2: 99%  98% 99%   Weight:       Height:        Body mass index is 22 25 kg/m²  I/O last 24 hours: In: 7232 [P O :1340;  I V :2440]  Out: 750 [Urine:750]      Physical Exam: General appearance: alert, appears stated age, cooperative and mild distress  Lungs: clear to auscultation bilaterally  Heart: regular rate and rhythm, S1, S2 normal, no murmur, click, rub or gallop  Extremities: extremities normal, atraumatic, no cyanosis or edema  Skin: Skin color, texture, turgor normal  No rashes or lesions  Neurologic: Grossly normal     Invasive Devices     Central Venous Catheter Line            Port A Cath 10/26/17 Right Chest 6 days Labs:   Recent Results (from the past 24 hour(s))   Basic metabolic panel    Collection Time: 11/02/17  6:06 AM   Result Value Ref Range    Sodium 134 (L) 136 - 145 mmol/L    Potassium 3 6 3 5 - 5 3 mmol/L    Chloride 99 (L) 100 - 108 mmol/L    CO2 27 21 - 32 mmol/L    Anion Gap 8 4 - 13 mmol/L    BUN 13 5 - 25 mg/dL    Creatinine 0 60 0 60 - 1 30 mg/dL    Glucose 91 65 - 140 mg/dL    Calcium 9 3 8 3 - 10 1 mg/dL    eGFR 146 ml/min/1 73sq m   CBC    Collection Time: 11/02/17  6:06 AM   Result Value Ref Range    WBC 9 31 4 31 - 10 16 Thousand/uL    RBC 4 71 3 88 - 5 62 Million/uL    Hemoglobin 9 4 (L) 12 0 - 17 0 g/dL    Hematocrit 28 5 (L) 36 5 - 49 3 %    MCV 61 (L) 82 - 98 fL    MCH 20 0 (L) 26 8 - 34 3 pg    MCHC 33 0 31 4 - 37 4 g/dL    RDW 17 1 (H) 11 6 - 15 1 %    Platelets 587 (H) 551 - 390 Thousands/uL    MPV 8 9 8 9 - 12 7 fL       Radiology Results: I have personally reviewed pertinent reports  Other Diagnostic Testing:   I have personally reviewed pertinent reports          Active Meds:   Current Facility-Administered Medications   Medication Dose Route Frequency    acetaminophen (TYLENOL) tablet 975 mg  975 mg Oral Q8H [facility]    calcium carbonate (TUMS) chewable tablet 500 mg  500 mg Oral BID PRN    dexamethasone (DECADRON) injection 4 mg  4 mg Intravenous BID    diazepam (VALIUM) tablet 5 mg  5 mg Oral Q8H    diphenhydrAMINE (BENADRYL) tablet 25 mg  25 mg Oral Q8H PRN    docusate sodium (COLACE) capsule 100 mg  100 mg Oral BID    doxepin (SINEquan) capsule 25 mg  25 mg Oral HS    dronabinol (MARINOL) capsule 2 5 mg  2 5 mg Oral BID before lunch/dinner    fentaNYL 1250 mcg in sodium chloride 0 9% 125mL drip  75 mcg/hr Intravenous Continuous    fentanyl citrate (PF) 100 MCG/2ML 50 mcg  50 mcg Intravenous Q2H PRN    gabapentin (NEURONTIN) capsule 400 mg  400 mg Oral HS    heparin (porcine) subcutaneous injection 5,000 Units  5,000 Units Subcutaneous Q8H [facility]    melatonin tablet 3 mg  3 mg Oral HS    mirtazapine (REMERON) tablet 15 mg  15 mg Oral HS    nicotine (NICODERM CQ) 21 mg/24 hr TD 24 hr patch 21 mg  21 mg Transdermal Daily    oxyCODONE (ROXICODONE) immediate release tablet 20 mg  20 mg Oral Q3H PRN    oxyCODONE (ROXICODONE) IR tablet 15 mg  15 mg Oral Q3H PRN    pantoprazole (PROTONIX) EC tablet 40 mg  40 mg Oral Early Morning    pneumococcal 13-valent conjugate vaccine (PREVNAR-13) IM injection 0 5 mL  0 5 mL Intramuscular Prior to discharge    polyethylene glycol (MIRALAX) packet 17 g  17 g Oral Daily    prochlorperazine (COMPAZINE) tablet 10 mg  10 mg Oral Q6H PRN    senna (SENOKOT) tablet 17 2 mg  2 tablet Oral BID    sodium chloride 0 9 % infusion  75 mL/hr Intravenous Continuous    white petrolatum-mineral oil (EUCERIN,HYDROCERIN) cream   Topical PRN         VTE Pharmacologic Prophylaxis: Heparin  VTE Mechanical Prophylaxis: sequential compression device    Daphne Rizo DO

## 2017-11-03 ENCOUNTER — APPOINTMENT (INPATIENT)
Dept: RADIOLOGY | Facility: HOSPITAL | Age: 37
DRG: 136 | End: 2017-11-03
Payer: COMMERCIAL

## 2017-11-03 ENCOUNTER — ANESTHESIA (INPATIENT)
Dept: RADIOLOGY | Facility: HOSPITAL | Age: 37
DRG: 136 | End: 2017-11-03
Payer: COMMERCIAL

## 2017-11-03 ENCOUNTER — APPOINTMENT (OUTPATIENT)
Dept: RADIOLOGY | Facility: HOSPITAL | Age: 37
DRG: 136 | End: 2017-11-03
Payer: COMMERCIAL

## 2017-11-03 LAB
GLUCOSE SERPL-MCNC: 100 MG/DL (ref 65–140)
GLUCOSE SERPL-MCNC: 102 MG/DL (ref 65–140)
GLUCOSE SERPL-MCNC: 107 MG/DL (ref 65–140)

## 2017-11-03 PROCEDURE — 88341 IMHCHEM/IMCYTCHM EA ADD ANTB: CPT | Performed by: INTERNAL MEDICINE

## 2017-11-03 PROCEDURE — 88342 IMHCHEM/IMCYTCHM 1ST ANTB: CPT | Performed by: INTERNAL MEDICINE

## 2017-11-03 PROCEDURE — 99152 MOD SED SAME PHYS/QHP 5/>YRS: CPT

## 2017-11-03 PROCEDURE — 76942 ECHO GUIDE FOR BIOPSY: CPT

## 2017-11-03 PROCEDURE — 0FB13ZX EXCISION OF RIGHT LOBE LIVER, PERCUTANEOUS APPROACH, DIAGNOSTIC: ICD-10-PCS | Performed by: RADIOLOGY

## 2017-11-03 PROCEDURE — 72146 MRI CHEST SPINE W/O DYE: CPT

## 2017-11-03 PROCEDURE — 88307 TISSUE EXAM BY PATHOLOGIST: CPT | Performed by: INTERNAL MEDICINE

## 2017-11-03 PROCEDURE — 47000 NEEDLE BIOPSY OF LIVER PERQ: CPT

## 2017-11-03 PROCEDURE — 88333 PATH CONSLTJ SURG CYTO XM 1: CPT | Performed by: INTERNAL MEDICINE

## 2017-11-03 PROCEDURE — 99153 MOD SED SAME PHYS/QHP EA: CPT

## 2017-11-03 PROCEDURE — 82948 REAGENT STRIP/BLOOD GLUCOSE: CPT

## 2017-11-03 RX ORDER — DEXAMETHASONE 2 MG/1
2 TABLET ORAL
Status: DISCONTINUED | OUTPATIENT
Start: 2017-11-04 | End: 2017-11-04

## 2017-11-03 RX ORDER — DIAZEPAM 5 MG/1
5 TABLET ORAL EVERY 8 HOURS
Qty: 45 TABLET | Refills: 0 | Status: SHIPPED | OUTPATIENT
Start: 2017-11-03

## 2017-11-03 RX ORDER — DEXAMETHASONE 4 MG/1
TABLET ORAL
Qty: 25 TABLET | Refills: 0 | Status: SHIPPED | OUTPATIENT
Start: 2017-11-03 | End: 2017-11-23

## 2017-11-03 RX ORDER — FENTANYL CITRATE 50 UG/ML
INJECTION, SOLUTION INTRAMUSCULAR; INTRAVENOUS CODE/TRAUMA/SEDATION MEDICATION
Status: COMPLETED | OUTPATIENT
Start: 2017-11-03 | End: 2017-11-03

## 2017-11-03 RX ORDER — MIDAZOLAM HYDROCHLORIDE 1 MG/ML
INJECTION INTRAMUSCULAR; INTRAVENOUS CODE/TRAUMA/SEDATION MEDICATION
Status: COMPLETED | OUTPATIENT
Start: 2017-11-03 | End: 2017-11-03

## 2017-11-03 RX ORDER — LORAZEPAM 2 MG/ML
1 INJECTION INTRAMUSCULAR EVERY 4 HOURS PRN
Status: DISCONTINUED | OUTPATIENT
Start: 2017-11-03 | End: 2017-11-05

## 2017-11-03 RX ORDER — GABAPENTIN 400 MG/1
400 CAPSULE ORAL
Qty: 15 CAPSULE | Refills: 0 | Status: SHIPPED | OUTPATIENT
Start: 2017-11-03

## 2017-11-03 RX ORDER — DEXAMETHASONE 4 MG/1
4 TABLET ORAL DAILY
Status: DISCONTINUED | OUTPATIENT
Start: 2017-11-04 | End: 2017-11-04

## 2017-11-03 RX ORDER — FENTANYL 75 UG/H
1 PATCH TRANSDERMAL
Qty: 5 PATCH | Refills: 0 | Status: SHIPPED | OUTPATIENT
Start: 2017-11-06 | End: 2017-12-04 | Stop reason: HOSPADM

## 2017-11-03 RX ORDER — FENTANYL 75 UG/H
75 PATCH TRANSDERMAL
Status: DISCONTINUED | OUTPATIENT
Start: 2017-11-03 | End: 2017-11-06 | Stop reason: HOSPADM

## 2017-11-03 RX ORDER — OXYCODONE HYDROCHLORIDE 20 MG/1
20 TABLET ORAL
Qty: 120 TABLET | Refills: 0 | Status: SHIPPED | OUTPATIENT
Start: 2017-11-03 | End: 2017-11-23

## 2017-11-03 RX ADMIN — HEPARIN SODIUM 5000 UNITS: 5000 INJECTION, SOLUTION INTRAVENOUS; SUBCUTANEOUS at 22:25

## 2017-11-03 RX ADMIN — OXYCODONE HYDROCHLORIDE 20 MG: 10 TABLET ORAL at 19:42

## 2017-11-03 RX ADMIN — GABAPENTIN 400 MG: 400 CAPSULE ORAL at 22:25

## 2017-11-03 RX ADMIN — FENTANYL CITRATE 50 MCG: 50 INJECTION, SOLUTION INTRAMUSCULAR; INTRAVENOUS at 11:26

## 2017-11-03 RX ADMIN — MIDAZOLAM 1 MG: 1 INJECTION INTRAMUSCULAR; INTRAVENOUS at 11:22

## 2017-11-03 RX ADMIN — OXYCODONE HYDROCHLORIDE 20 MG: 10 TABLET ORAL at 01:04

## 2017-11-03 RX ADMIN — DIAZEPAM 5 MG: 5 TABLET ORAL at 15:22

## 2017-11-03 RX ADMIN — PANTOPRAZOLE SODIUM 40 MG: 40 TABLET, DELAYED RELEASE ORAL at 06:45

## 2017-11-03 RX ADMIN — HEPARIN SODIUM 5000 UNITS: 5000 INJECTION, SOLUTION INTRAVENOUS; SUBCUTANEOUS at 06:45

## 2017-11-03 RX ADMIN — MIRTAZAPINE 15 MG: 15 TABLET, FILM COATED ORAL at 22:25

## 2017-11-03 RX ADMIN — DIAZEPAM 5 MG: 5 TABLET ORAL at 06:45

## 2017-11-03 RX ADMIN — OXYCODONE HYDROCHLORIDE 20 MG: 10 TABLET ORAL at 22:24

## 2017-11-03 RX ADMIN — ACETAMINOPHEN 975 MG: 325 TABLET, FILM COATED ORAL at 06:45

## 2017-11-03 RX ADMIN — DOCUSATE SODIUM 100 MG: 100 CAPSULE, LIQUID FILLED ORAL at 17:37

## 2017-11-03 RX ADMIN — POLYETHYLENE GLYCOL 3350 17 G: 17 POWDER, FOR SOLUTION ORAL at 12:47

## 2017-11-03 RX ADMIN — DOCUSATE SODIUM 100 MG: 100 CAPSULE, LIQUID FILLED ORAL at 12:46

## 2017-11-03 RX ADMIN — OXYCODONE HYDROCHLORIDE 20 MG: 10 TABLET ORAL at 12:46

## 2017-11-03 RX ADMIN — FENTANYL CITRATE 50 MCG: 50 INJECTION INTRAMUSCULAR; INTRAVENOUS at 13:30

## 2017-11-03 RX ADMIN — SENNOSIDES 17.2 MG: 8.6 TABLET, FILM COATED ORAL at 12:47

## 2017-11-03 RX ADMIN — DRONABINOL 2.5 MG: 2.5 CAPSULE ORAL at 17:37

## 2017-11-03 RX ADMIN — ACETAMINOPHEN 975 MG: 325 TABLET, FILM COATED ORAL at 15:22

## 2017-11-03 RX ADMIN — OXYCODONE HYDROCHLORIDE 20 MG: 10 TABLET ORAL at 16:12

## 2017-11-03 RX ADMIN — ACETAMINOPHEN 975 MG: 325 TABLET, FILM COATED ORAL at 22:24

## 2017-11-03 RX ADMIN — DEXAMETHASONE 4 MG: 4 TABLET ORAL at 08:57

## 2017-11-03 RX ADMIN — MIDAZOLAM 1 MG: 1 INJECTION INTRAMUSCULAR; INTRAVENOUS at 11:18

## 2017-11-03 RX ADMIN — SENNOSIDES 17.2 MG: 8.6 TABLET, FILM COATED ORAL at 17:37

## 2017-11-03 RX ADMIN — MELATONIN TAB 3 MG 3 MG: 3 TAB at 22:25

## 2017-11-03 RX ADMIN — NICOTINE 21 MG: 21 PATCH, EXTENDED RELEASE TRANSDERMAL at 08:57

## 2017-11-03 RX ADMIN — OXYCODONE HYDROCHLORIDE 20 MG: 10 TABLET ORAL at 04:20

## 2017-11-03 RX ADMIN — HEPARIN SODIUM 5000 UNITS: 5000 INJECTION, SOLUTION INTRAVENOUS; SUBCUTANEOUS at 15:22

## 2017-11-03 RX ADMIN — DRONABINOL 2.5 MG: 2.5 CAPSULE ORAL at 12:47

## 2017-11-03 RX ADMIN — DEXAMETHASONE 4 MG: 4 TABLET ORAL at 15:22

## 2017-11-03 RX ADMIN — OXYCODONE HYDROCHLORIDE 20 MG: 10 TABLET ORAL at 06:51

## 2017-11-03 RX ADMIN — MIDAZOLAM 0.5 MG: 1 INJECTION INTRAMUSCULAR; INTRAVENOUS at 11:26

## 2017-11-03 RX ADMIN — OXYCODONE HYDROCHLORIDE 20 MG: 10 TABLET ORAL at 10:09

## 2017-11-03 RX ADMIN — FENTANYL CITRATE 50 MCG: 50 INJECTION, SOLUTION INTRAMUSCULAR; INTRAVENOUS at 11:18

## 2017-11-03 RX ADMIN — DOXEPIN HYDROCHLORIDE 25 MG: 25 CAPSULE ORAL at 22:25

## 2017-11-03 RX ADMIN — LORAZEPAM 1 MG: 2 INJECTION INTRAMUSCULAR; INTRAVENOUS at 20:24

## 2017-11-03 RX ADMIN — SODIUM CHLORIDE 75 ML/HR: 0.9 INJECTION, SOLUTION INTRAVENOUS at 11:34

## 2017-11-03 RX ADMIN — DIAZEPAM 5 MG: 5 TABLET ORAL at 22:25

## 2017-11-03 RX ADMIN — FENTANYL 75 MCG: 75 PATCH, EXTENDED RELEASE TRANSDERMAL at 15:23

## 2017-11-03 NOTE — BRIEF OP NOTE (RAD/CATH)
INTERVENTIONAL RADIOLOGY PROCEDURE NOTE    Preoperative diagnosis:  Metastatic lung cancer    Postoperative diagnosis: Same    Procedure:  Ultrasound-guided liver biopsy    Surgeon: Josey Millan MD     Assistant: None  No qualified resident was available      Blood loss:  Less than 1 mL    Specimens:  Four 18 gauge cores    Findings:  Adequate for evaluation    Complications:  None immediate    Anesthesia: IV conscious sedation

## 2017-11-03 NOTE — PLAN OF CARE
Problem: Nutrition/Hydration-ADULT  Goal: Nutrient/Hydration intake appropriate for improving, restoring or maintaining nutritional needs  Monitor and assess patient's nutrition/hydration status for malnutrition (ex- brittle hair, bruises, dry skin, pale skin and conjunctiva, muscle wasting, smooth red tongue, and disorientation)  Collaborate with interdisciplinary team and initiate plan and interventions as ordered  Monitor patient's weight and dietary intake as ordered or per policy  Utilize nutrition screening tool and intervene per policy  Determine patient's food preferences and provide high-protein, high-caloric foods as appropriate  INTERVENTIONS:  - Monitor oral intake, urinary output, labs, and treatment plans  - Assess nutrition and hydration status and recommend course of action  - Evaluate amount of meals eaten  - Assist patient with eating if necessary   - Allow adequate time for meals  - Recommend/ encourage appropriate diets, oral nutritional supplements, and vitamin/mineral supplements  - Order, calculate, and assess calorie counts as needed  - Recommend, monitor, and adjust tube feedings and TPN/PPN based on assessed needs  - Assess need for intravenous fluids  - Provide specific nutrition/hydration education as appropriate  - Include patient/family/caregiver in decisions related to nutrition  Outcome: Progressing  Pt intake variable x past few days  Per pt: appetite is fair-good today, eager to eat

## 2017-11-03 NOTE — PROGRESS NOTES
33-year-old male with metastatic adenocarcinoma of the lung being considered for immunotherapy  Patient presents for ultrasound-guided liver biopsy  The history and physical were reviewed, along with progress notes, and the patient was examined  There are no changes since it was written

## 2017-11-03 NOTE — PROGRESS NOTES
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)    Nausea    Neuropathic pain syndrome (non-herpetic)    Elevated rheumatoid factor    Under care of palliative care physician    Abdominal pain    Back pain    Chest pain       Plan:  Symptom Management: cancer-related pain, anxiety  Scripts left on paper chart for fentanyl patch, oxycodone, diazepam, dexamethasone, gabapentin  Patient to follow up with Dr Maira Aranda in 1-2 weeks after d/c    - fentanyl patch 75mcg/hr   - acetaminophen 975mg q 8h scheduled   - dexamethasone 4mg PO breakfast, 2mg lunch   - diazepam 5mg PO q 8h scheduled   - dronabinol 2 5mg BID before lunch and dinner - to be stopped at d/c   - gabapentin 400mg q HS   - mirtazapine 15mg q HS   - melatonin 3mg q HS   - doxepin 25mg q HS   - senna 2 tab BID, docusate 100mg BID, Miralax 17g daily    Goals of Care: Life-prolonging    - treatment-focused plan of care with goal of living longer   - level 1 full code    History  Transitioned from fentanyl infusion to patch  Pain is stable and controlled on current regimen  Feeling fatigued from activities of the day but overall well enough to go home tomorrow  Biopsy done in IR  Unable to complete MRIs scheduled for today - to be done as outpatient      - Current Analgesic regimen:  Fentanyl patch, PRN oxycodone IR and fentanyl IVP, dexamethasone  - PRN Use of Pain Medications in past 24h:  8 doses of oxycodone IR, 2 doses of IV fentanyl      Meds  all current active meds have been reviewed and current meds:   Current Facility-Administered Medications Medication Dose Route Frequency    acetaminophen (TYLENOL) tablet 975 mg  975 mg Oral Q8H [address]    calcium carbonate (TUMS) chewable tablet 500 mg  500 mg Oral BID PRN    [START ON 11/4/2017] dexamethasone (DECADRON) tablet 2 mg  2 mg Oral Daily With Lunch    [START ON 11/4/2017] dexamethasone (DECADRON) tablet 4 mg  4 mg Oral Daily    diazepam (VALIUM) tablet 5 mg  5 mg Oral Q8H    diphenhydrAMINE (BENADRYL) tablet 25 mg  25 mg Oral Q8H PRN    docusate sodium (COLACE) capsule 100 mg  100 mg Oral BID    doxepin (SINEquan) capsule 25 mg  25 mg Oral HS    dronabinol (MARINOL) capsule 2 5 mg  2 5 mg Oral BID before lunch/dinner    fentaNYL (DURAGESIC) 75 mcg/hr TD 72 hr patch 75 mcg  75 mcg Transdermal Q72H    fentanyl citrate (PF) 100 MCG/2ML 50 mcg  50 mcg Intravenous Q2H PRN    gabapentin (NEURONTIN) capsule 400 mg  400 mg Oral HS    heparin (porcine) subcutaneous injection 5,000 Units  5,000 Units Subcutaneous Q8H [address]    melatonin tablet 3 mg  3 mg Oral HS    mirtazapine (REMERON) tablet 15 mg  15 mg Oral HS    nicotine (NICODERM CQ) 21 mg/24 hr TD 24 hr patch 21 mg  21 mg Transdermal Daily    oxyCODONE (ROXICODONE) immediate release tablet 20 mg  20 mg Oral Q3H PRN    oxyCODONE (ROXICODONE) IR tablet 15 mg  15 mg Oral Q3H PRN    pantoprazole (PROTONIX) EC tablet 40 mg  40 mg Oral Early Morning    pneumococcal 13-valent conjugate vaccine (PREVNAR-13) IM injection 0 5 mL  0 5 mL Intramuscular Prior to discharge    polyethylene glycol (MIRALAX) packet 17 g  17 g Oral Daily    prochlorperazine (COMPAZINE) tablet 10 mg  10 mg Oral Q6H PRN    senna (SENOKOT) tablet 17 2 mg  2 tablet Oral BID    sodium chloride 0 9 % infusion  75 mL/hr Intravenous Continuous    white petrolatum-mineral oil (EUCERIN,HYDROCERIN) cream   Topical PRN       Allergies   Allergen Reactions    Zofran [Ondansetron] GI Intolerance       Objective  Physical Exam   Constitutional: He is oriented to person, place, and time  He is cooperative  No distress  fatigued   Pulmonary/Chest: Effort normal    Neurological: He is alert and oriented to person, place, and time  Skin: Skin is warm and dry  Psychiatric: He has a normal mood and affect  Cognition and memory are normal        Lab Results: I have personally reviewed pertinent labs        Kathy Bocanegra, [address] [facility]  Office number: [phone]

## 2017-11-03 NOTE — ANESTHESIA PREPROCEDURE EVALUATION
Review of Systems/Medical History  Patient summary reviewed  Chart reviewed  No history of anesthetic complications     Cardiovascular  EKG reviewed, Negative cardio ROS    Pulmonary  ,   Comment: Stage 4 LUng ca; s/p chemo/xrt     GI/Hepatic    GERD ,             Endo/Other     GYN       Hematology   Musculoskeletal       Neurology   Psychology   Anxiety, Depression ,   Chronic opioid dependence       Lab Results   Component Value Date    WBC 9 31 11/02/2017    HGB 9 4 (L) 11/02/2017     (H) 11/02/2017     Lab Results   Component Value Date     (L) 11/02/2017    K 3 6 11/02/2017    BUN 13 11/02/2017    CREATININE 0 60 11/02/2017    GLUCOSE 91 11/02/2017     Lab Results   Component Value Date    PTT 29 10/26/2017      Lab Results   Component Value Date    INR 1 17 (H) 10/26/2017       Blood type A    No results found for: HGBA1C      Physical Exam    Airway    Mallampati score: II  TM Distance: >3 FB       Dental       Cardiovascular  Comment: Negative ROS,     Pulmonary      Other Findings        Anesthesia Plan  ASA Score- 3       Anesthesia Type- general with ASA Monitors  Additional Monitors:   Airway Plan: LMA  Comment:  YANCY Arita , have personally seen and evaluated the patient prior to anesthetic care  I have reviewed the pre-anesthetic record, and other medical records if appropriate to the anesthetic care  If a CRNA is involved in the case, I have reviewed the CRNA assessment, if present, and agree  Risks/benefits and alternatives discussed with patient including possible PONV, sore throat, and possibility of rare anesthetic and surgical emergencies          Induction- intravenous  Informed Consent- Anesthetic plan and risks discussed with patient  I personally reviewed this patient with the CRNA  Discussed and agreed on the Anesthesia Plan with the CRNA  Alejandro Boyce

## 2017-11-03 NOTE — PROGRESS NOTES
ANESTHESIOLOGY ATTENDING:    Patient wishes to proceed with MRI without anesthesia d/t nausea after previous MRI  I advised patient that I would forward his request to the scheduling service and offered our services in the future should he change his mind      Augie Waller MD  November 3, 2017  8:55 AM

## 2017-11-03 NOTE — PROGRESS NOTES
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)    Nausea    Neuropathic pain syndrome (non-herpetic)    Under care of palliative care physician    Abdominal pain    Back pain    Chest pain     Stage IV adenocarcinoma of lung  Patient diagnosed approximately 4 years ago  Kvng Mahajan received chemotherapy and immunotherapy as an outpatient   No treatment this year   Sees Dr Tobias Walter as an outpatient   CT PE study showed worsening tumor burden   MRI of brain and C spine overnight showed no mets   MRI for T and L spine today for 1pm   IR biopsy today at 10AM  · Dx 4 yrs ago, CT PE with worsening tumor burden, large right upper lobe mass with compression of the hilum and increased right middle lobe consolidation, possible tumor versus postobstructive atelectasis also right hilar adenopathy, new 5 cm left apical noduleWill obtain MRI of the brain and spine to assess for metastatic disease  · Monitor blood counts and serum electrolytes  · Liver bx with IR today 10am  · MRI today at 1pm     Adrenal gland/liver metastases  · Patient has extensive tumor burden   Follow-up repeat imaging to better assess tumor burden      Pain of metastatic malignancy with continuous opioid dependence  Patient reports improvement with this pain although he still is not adequately controlled  · Fentanyl drip - plan to convert back to patch prior to discharge  · Acetaminophen 975mg q8 scheduled  · Oxycodone IR for moderate  · Dexemethasone 4mg IV BID  · Diazepam 5mg PO q8 scheduled  · Gabapentin 400mg PO Q HS  · Fentanyl  IV Q2 PRN breakthrough   · Continue bowel regimen  · Will need to have good pain regimen for anticipated discharge on Friday after IR biopsy     Nausea/vomiting  · Continue Phenergan p r n  · Allergic to Zofran           Anxiety/Depression  · Continue lorazepam 0 5 mg IV Q for p r n  · Begin Valium 5 mg q 8h scheduled PO  · Continue dexamethasone 4 mg b i d  Scheduled  · Continue Marinol 2 5 mg b i d      Anemia  - Hgb 9   Monitor CBC   May be due to AOCD     Insomnia  Likely multifactorial from pain, nausea, and anxiety   Patient states he has tried Ambien before with no benefit  · Continue treatment for medical issues as outlined above  melatonin 3 mg p o  Q h s  Disposition: MRI and IR biopsy today       Subjective:   Patient reports significant fatigue today and is quite sleepy  He is set up to go to IR for liver bx at 10am today and MRI at 1pm   No acute events overnight per nursing staff  Vitals: Temp (24hrs), Av 5 °F (36 9 °C), Min:98 1 °F (36 7 °C), Max:98 9 °F (37 2 °C)  Current: Temperature: 98 1 °F (36 7 °C)  Vitals:    17 0752 17 1506 17 2152 17 2326   BP: 121/73 143/81 126/65 127/74   Pulse: 81 101 99 92   Resp: 21 18 18 18   Temp: 98 4 °F (36 9 °C) 98 4 °F (36 9 °C) 98 9 °F (37 2 °C) 98 1 °F (36 7 °C)   TempSrc:  Oral Oral Oral   SpO2: 99% 96% 98% 96%   Weight:       Height:        Body mass index is 22 25 kg/m²  I/O last 24 hours: In: 3803 8 [P O :2600;  I V :1203 8]  Out: 1250 [Urine:1250]      Physical Exam: General appearance: alert, appears stated age and cooperative  Lungs: clear to auscultation bilaterally  Heart: regular rate and rhythm, S1, S2 normal, no murmur, click, rub or gallop  Abdomen: soft, non-tender; bowel sounds normal; no masses,  no organomegaly  Extremities: extremities normal, atraumatic, no cyanosis or edema  Neurologic: Grossly normal     Invasive Devices     Central Venous Catheter Line            Port A Cath 10/26/17 Right Chest 7 days                          Labs: No results found for this or any previous visit (from the past 24 hour(s))  Radiology Results: I have personally reviewed pertinent reports  Other Diagnostic Testing:   I have personally reviewed pertinent reports          Active Meds:   Current Facility-Administered Medications   Medication Dose Route Frequency    acetaminophen (TYLENOL) tablet 975 mg  975 mg Oral Q8H [address]    calcium carbonate (TUMS) chewable tablet 500 mg  500 mg Oral BID PRN    dexamethasone (DECADRON) tablet 4 mg  4 mg Oral BID With Meals    diazepam (VALIUM) tablet 5 mg  5 mg Oral Q8H    diphenhydrAMINE (BENADRYL) tablet 25 mg  25 mg Oral Q8H PRN    docusate sodium (COLACE) capsule 100 mg  100 mg Oral BID    doxepin (SINEquan) capsule 25 mg  25 mg Oral HS    dronabinol (MARINOL) capsule 2 5 mg  2 5 mg Oral BID before lunch/dinner    fentaNYL (DURAGESIC) 75 mcg/hr TD 72 hr patch 75 mcg  75 mcg Transdermal Q72H    fentaNYL 1250 mcg in sodium chloride 0 9% 125mL drip  50 mcg/hr Intravenous Continuous    fentanyl citrate (PF) 100 MCG/2ML 50 mcg  50 mcg Intravenous Q2H PRN    gabapentin (NEURONTIN) capsule 400 mg  400 mg Oral HS    heparin (porcine) subcutaneous injection 5,000 Units  5,000 Units Subcutaneous Q8H [address]    melatonin tablet 3 mg  3 mg Oral HS    mirtazapine (REMERON) tablet 15 mg  15 mg Oral HS    nicotine (NICODERM CQ) 21 mg/24 hr TD 24 hr patch 21 mg  21 mg Transdermal Daily    oxyCODONE (ROXICODONE) immediate release tablet 20 mg  20 mg Oral Q3H PRN    oxyCODONE (ROXICODONE) IR tablet 15 mg  15 mg Oral Q3H PRN    pantoprazole (PROTONIX) EC tablet 40 mg  40 mg Oral Early Morning    pneumococcal 13-valent conjugate vaccine (PREVNAR-13) IM injection 0 5 mL  0 5 mL Intramuscular Prior to discharge    polyethylene glycol (MIRALAX) packet 17 g  17 g Oral Daily    prochlorperazine (COMPAZINE) tablet 10 mg  10 mg Oral Q6H PRN    senna (SENOKOT) tablet 17 2 mg  2 tablet Oral BID    sodium chloride 0 9 % infusion  75 mL/hr Intravenous Continuous    white petrolatum-mineral oil (EUCERIN,HYDROCERIN) cream   Topical PRN         VTE Pharmacologic Prophylaxis: Heparin  VTE Mechanical Prophylaxis: sequential compression device    Linus Leal DO

## 2017-11-03 NOTE — PROGRESS NOTES
11/03/17 1100   Plan of Care   Comments Visited with Cortney Knight and Janie Cough       Assessment Completed by: Unit visit

## 2017-11-04 PROBLEM — K62.5 BRIGHT RED BLOOD PER RECTUM: Status: ACTIVE | Noted: 2017-11-04

## 2017-11-04 LAB
ABO GROUP BLD: NORMAL
ANION GAP SERPL CALCULATED.3IONS-SCNC: 7 MMOL/L (ref 4–13)
BLD GP AB SCN SERPL QL: NEGATIVE
BUN SERPL-MCNC: 16 MG/DL (ref 5–25)
CALCIUM SERPL-MCNC: 9 MG/DL (ref 8.3–10.1)
CHLORIDE SERPL-SCNC: 100 MMOL/L (ref 100–108)
CO2 SERPL-SCNC: 28 MMOL/L (ref 21–32)
CREAT SERPL-MCNC: 0.66 MG/DL (ref 0.6–1.3)
GFR SERPL CREATININE-BSD FRML MDRD: 141 ML/MIN/1.73SQ M
GLUCOSE SERPL-MCNC: 115 MG/DL (ref 65–140)
GLUCOSE SERPL-MCNC: 133 MG/DL (ref 65–140)
GLUCOSE SERPL-MCNC: 140 MG/DL (ref 65–140)
HCT VFR BLD AUTO: 27.6 % (ref 36.5–49.3)
HCT VFR BLD AUTO: 28.4 % (ref 36.5–49.3)
HEMOCCULT STL QL: NEGATIVE
HGB BLD-MCNC: 9 G/DL (ref 12–17)
HGB BLD-MCNC: 9.3 G/DL (ref 12–17)
INR PPP: 1.09 (ref 0.86–1.16)
POTASSIUM SERPL-SCNC: 3.8 MMOL/L (ref 3.5–5.3)
PROTHROMBIN TIME: 14.1 SECONDS (ref 12.1–14.4)
RH BLD: POSITIVE
SODIUM SERPL-SCNC: 135 MMOL/L (ref 136–145)
SPECIMEN EXPIRATION DATE: NORMAL

## 2017-11-04 PROCEDURE — 86901 BLOOD TYPING SEROLOGIC RH(D): CPT | Performed by: INTERNAL MEDICINE

## 2017-11-04 PROCEDURE — 86900 BLOOD TYPING SEROLOGIC ABO: CPT | Performed by: INTERNAL MEDICINE

## 2017-11-04 PROCEDURE — 85610 PROTHROMBIN TIME: CPT | Performed by: INTERNAL MEDICINE

## 2017-11-04 PROCEDURE — 85018 HEMOGLOBIN: CPT | Performed by: INTERNAL MEDICINE

## 2017-11-04 PROCEDURE — 86850 RBC ANTIBODY SCREEN: CPT | Performed by: INTERNAL MEDICINE

## 2017-11-04 PROCEDURE — 85014 HEMATOCRIT: CPT | Performed by: INTERNAL MEDICINE

## 2017-11-04 PROCEDURE — C9113 INJ PANTOPRAZOLE SODIUM, VIA: HCPCS | Performed by: INTERNAL MEDICINE

## 2017-11-04 PROCEDURE — 82948 REAGENT STRIP/BLOOD GLUCOSE: CPT

## 2017-11-04 PROCEDURE — 80048 BASIC METABOLIC PNL TOTAL CA: CPT | Performed by: INTERNAL MEDICINE

## 2017-11-04 PROCEDURE — 82272 OCCULT BLD FECES 1-3 TESTS: CPT | Performed by: INTERNAL MEDICINE

## 2017-11-04 RX ORDER — SENNOSIDES 8.6 MG
2 TABLET ORAL 2 TIMES DAILY
Qty: 56 TABLET | Refills: 0 | Status: SHIPPED | OUTPATIENT
Start: 2017-11-04 | End: 2017-11-23

## 2017-11-04 RX ORDER — DOCUSATE SODIUM 100 MG/1
100 CAPSULE, LIQUID FILLED ORAL 2 TIMES DAILY
Qty: 28 CAPSULE | Refills: 0 | Status: SHIPPED | OUTPATIENT
Start: 2017-11-04 | End: 2017-11-06

## 2017-11-04 RX ORDER — DOCUSATE SODIUM 100 MG/1
100 CAPSULE, LIQUID FILLED ORAL 2 TIMES DAILY
Qty: 60 CAPSULE | Refills: 0 | Status: SHIPPED | OUTPATIENT
Start: 2017-11-04 | End: 2017-11-04

## 2017-11-04 RX ORDER — SENNOSIDES 8.6 MG
2 TABLET ORAL 2 TIMES DAILY
Qty: 120 TABLET | Refills: 0 | Status: SHIPPED | OUTPATIENT
Start: 2017-11-04 | End: 2017-11-04

## 2017-11-04 RX ORDER — PANTOPRAZOLE SODIUM 40 MG/1
40 INJECTION, POWDER, FOR SOLUTION INTRAVENOUS EVERY 12 HOURS SCHEDULED
Status: DISCONTINUED | OUTPATIENT
Start: 2017-11-04 | End: 2017-11-05

## 2017-11-04 RX ADMIN — DIAZEPAM 5 MG: 5 TABLET ORAL at 21:55

## 2017-11-04 RX ADMIN — DEXAMETHASONE 2 MG: 2 TABLET ORAL at 11:07

## 2017-11-04 RX ADMIN — OXYCODONE HYDROCHLORIDE 20 MG: 10 TABLET ORAL at 08:04

## 2017-11-04 RX ADMIN — DOCUSATE SODIUM 100 MG: 100 CAPSULE, LIQUID FILLED ORAL at 08:31

## 2017-11-04 RX ADMIN — ACETAMINOPHEN 975 MG: 325 TABLET, FILM COATED ORAL at 21:55

## 2017-11-04 RX ADMIN — LORAZEPAM 1 MG: 2 INJECTION INTRAMUSCULAR; INTRAVENOUS at 17:30

## 2017-11-04 RX ADMIN — PANTOPRAZOLE SODIUM 40 MG: 40 TABLET, DELAYED RELEASE ORAL at 06:40

## 2017-11-04 RX ADMIN — HEPARIN SODIUM 5000 UNITS: 5000 INJECTION, SOLUTION INTRAVENOUS; SUBCUTANEOUS at 06:40

## 2017-11-04 RX ADMIN — OXYCODONE HYDROCHLORIDE 15 MG: 5 TABLET ORAL at 18:16

## 2017-11-04 RX ADMIN — DRONABINOL 2.5 MG: 2.5 CAPSULE ORAL at 15:42

## 2017-11-04 RX ADMIN — DOCUSATE SODIUM 100 MG: 100 CAPSULE, LIQUID FILLED ORAL at 17:27

## 2017-11-04 RX ADMIN — MELATONIN TAB 3 MG 3 MG: 3 TAB at 21:55

## 2017-11-04 RX ADMIN — Medication 500 MG: at 04:37

## 2017-11-04 RX ADMIN — DIAZEPAM 5 MG: 5 TABLET ORAL at 06:40

## 2017-11-04 RX ADMIN — DRONABINOL 2.5 MG: 2.5 CAPSULE ORAL at 11:07

## 2017-11-04 RX ADMIN — NICOTINE 21 MG: 21 PATCH, EXTENDED RELEASE TRANSDERMAL at 08:31

## 2017-11-04 RX ADMIN — PANTOPRAZOLE SODIUM 40 MG: 40 INJECTION, POWDER, FOR SOLUTION INTRAVENOUS at 15:42

## 2017-11-04 RX ADMIN — DEXAMETHASONE 4 MG: 4 TABLET ORAL at 08:31

## 2017-11-04 RX ADMIN — MIRTAZAPINE 15 MG: 15 TABLET, FILM COATED ORAL at 21:54

## 2017-11-04 RX ADMIN — POLYETHYLENE GLYCOL 3350 17 G: 17 POWDER, FOR SOLUTION ORAL at 08:31

## 2017-11-04 RX ADMIN — DIAZEPAM 5 MG: 5 TABLET ORAL at 13:33

## 2017-11-04 RX ADMIN — DOXEPIN HYDROCHLORIDE 25 MG: 25 CAPSULE ORAL at 22:01

## 2017-11-04 RX ADMIN — OXYCODONE HYDROCHLORIDE 20 MG: 10 TABLET ORAL at 17:24

## 2017-11-04 RX ADMIN — OXYCODONE HYDROCHLORIDE 20 MG: 10 TABLET ORAL at 04:37

## 2017-11-04 RX ADMIN — ACETAMINOPHEN 975 MG: 325 TABLET, FILM COATED ORAL at 06:40

## 2017-11-04 RX ADMIN — SENNOSIDES 17.2 MG: 8.6 TABLET, FILM COATED ORAL at 08:31

## 2017-11-04 RX ADMIN — SENNOSIDES 17.2 MG: 8.6 TABLET, FILM COATED ORAL at 17:27

## 2017-11-04 RX ADMIN — GABAPENTIN 400 MG: 400 CAPSULE ORAL at 21:55

## 2017-11-04 RX ADMIN — LORAZEPAM 1 MG: 2 INJECTION INTRAMUSCULAR; INTRAVENOUS at 13:34

## 2017-11-04 RX ADMIN — OXYCODONE HYDROCHLORIDE 20 MG: 10 TABLET ORAL at 11:06

## 2017-11-04 RX ADMIN — OXYCODONE HYDROCHLORIDE 20 MG: 10 TABLET ORAL at 01:27

## 2017-11-04 RX ADMIN — ACETAMINOPHEN 975 MG: 325 TABLET, FILM COATED ORAL at 13:33

## 2017-11-04 RX ADMIN — SODIUM CHLORIDE 75 ML/HR: 0.9 INJECTION, SOLUTION INTRAVENOUS at 01:29

## 2017-11-04 NOTE — SOCIAL WORK
CM received a call from Resident Zacarias Farmer with SOD B this morning to discuss the patient's DC plan  Per [facility], the patient will be discharged this evening at 2000 with his spouse providing transportation  There was concern that the patient's insurance was not accurate  CM met with patient and was able to confirm that the patient's correct insurance information is on file  Patient's wife hand carried the patient's prescriptions to [address] [facility] where she will pick them up once they are ready  Patient and spouse have no further needs from CM  CM called [facility] back to provide final update that patient is ready for DC from  perspective  Update 1347: Per Dr Flash Washington, patient's DC has been held due to issues with securing prior authorization for necessary medication  PA has been submitted and the patient will be DC once it is received

## 2017-11-04 NOTE — PROGRESS NOTES
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)    Nausea    Neuropathic pain syndrome (non-herpetic)    Elevated rheumatoid factor    Under care of palliative care physician    Abdominal pain    Back pain    Chest pain    Bright red blood per rectum       Plan:  Symptom Management: cancer-related pain, anxiety  Patient to follow up with Dr Sherrie Carvajal in 1-2 weeks after d/c    - fentanyl patch 75mcg/hr   - acetaminophen 975mg q 8h scheduled   - dexamethasone 4mg PO breakfast, 2mg lunch   - diazepam 5mg PO q 8h scheduled   - dronabinol 2 5mg BID before lunch and dinner - to be stopped at d/c   - gabapentin 400mg q HS   - mirtazapine 15mg q HS   - melatonin 3mg q HS   - doxepin 25mg q HS   - senna 2 tab BID, docusate 100mg BID, Miralax 17g daily    Goals of Care: Life-prolonging    - treatment-focused plan of care with goal of living longer   - level 1 full code    Medication coverage   - Prior auth submitted this AM for his Duragesic and Oxycodone, initial plan was to do a 5 day supply, but his was denied by his insurance  Phone call was placed to do a [id number] review and there was no one available at the insurance company to engage in this conversation today and I was asked to call back during "regular working hours" to discuss the needs for these medications for this gentleman  I spoke with pharmacy who gave me an out-of-pocket expense for a 5 day supply and unfortunately this is quite costly for the patient and his spouse   Explained that since he is staying until tomorrow that this issue can likely be resolved by Monday and we can bridge him with the medications that he has at home  He states understanding to this and agrees with the plan  D/W Case management, pharmacy, nursing and SOD resident     History  Patient upset that he informed the nursing about the blood in his stool as now his discharge is being delayed  Long conversation with patient and family about his medications and insurance issues  They state understanding         Meds  all current active meds have been reviewed and current meds:   Current Facility-Administered Medications   Medication Dose Route Frequency    acetaminophen (TYLENOL) tablet 975 mg  975 mg Oral Q8H [address]    calcium carbonate (TUMS) chewable tablet 500 mg  500 mg Oral BID PRN    diazepam (VALIUM) tablet 5 mg  5 mg Oral Q8H    diphenhydrAMINE (BENADRYL) tablet 25 mg  25 mg Oral Q8H PRN    docusate sodium (COLACE) capsule 100 mg  100 mg Oral BID    doxepin (SINEquan) capsule 25 mg  25 mg Oral HS    dronabinol (MARINOL) capsule 2 5 mg  2 5 mg Oral BID before lunch/dinner    fentaNYL (DURAGESIC) 75 mcg/hr TD 72 hr patch 75 mcg  75 mcg Transdermal Q72H    fentanyl citrate (PF) 100 MCG/2ML 50 mcg  50 mcg Intravenous Q2H PRN    gabapentin (NEURONTIN) capsule 400 mg  400 mg Oral HS    heparin (porcine) subcutaneous injection 5,000 Units  5,000 Units Subcutaneous Q8H [address]    LORazepam (ATIVAN) 2 mg/mL injection 1 mg  1 mg Intravenous Q4H PRN    melatonin tablet 3 mg  3 mg Oral HS    mirtazapine (REMERON) tablet 15 mg  15 mg Oral HS    nicotine (NICODERM CQ) 21 mg/24 hr TD 24 hr patch 21 mg  21 mg Transdermal Daily    oxyCODONE (ROXICODONE) immediate release tablet 20 mg  20 mg Oral Q3H PRN    oxyCODONE (ROXICODONE) IR tablet 15 mg  15 mg Oral Q3H PRN    pantoprazole (PROTONIX) injection 40 mg  40 mg Intravenous Q12H [address]    pneumococcal 13-valent conjugate vaccine (PREVNAR-13) IM injection 0 5 mL  0 5 mL Intramuscular Prior to discharge    polyethylene glycol (MIRALAX) packet 17 g  17 g Oral Daily    prochlorperazine (COMPAZINE) tablet 10 mg  10 mg Oral Q6H PRN    senna (SENOKOT) tablet 17 2 mg  2 tablet Oral BID    white petrolatum-mineral oil (EUCERIN,HYDROCERIN) cream   Topical PRN       Allergies   Allergen Reactions    Zofran [Ondansetron] GI Intolerance       Objective  Physical Exam   Constitutional: He is oriented to person, place, and time  He appears well-nourished  No distress  HENT:   Head: Normocephalic and atraumatic  Eyes: EOM are normal  Right eye exhibits no discharge  Left eye exhibits no discharge  Neck: No thyromegaly present  Cardiovascular: Normal rate, regular rhythm and intact distal pulses  Pulmonary/Chest: Effort normal  No respiratory distress  Abdominal: Soft  Bowel sounds are normal  He exhibits no distension  Musculoskeletal: He exhibits no edema  Neurological: He is alert and oriented to person, place, and time  Skin: Skin is warm and dry  Psychiatric: He has a normal mood and affect  Nursing note and vitals reviewed  Lab Results: I have personally reviewed pertinent labs        Maeve Rush DO  Palliative & Supportive Care  Office number: [phone]

## 2017-11-04 NOTE — PROGRESS NOTES
Called by RN, Pt had BM with bright red blood  Pt was seen and examined  There was bright red blood in the toilet  Pt reports that this has happened to him once before in the past a few months ago  Pt stated that he has been having blood bms since 3am this morning  Will get stat H&H, PT/INR, type and screen  Will start pt on Protonix  Will DC Decadron for now since not sure if this is a rapid upper GI bleed (unlikely) or lower GI bleed  Will follow labs before involving GI at this point  Pt denied abdominal pain, fever, chill, nausea and vomiting  Reported that he is eating ok

## 2017-11-04 NOTE — PROGRESS NOTES
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)    Nausea    Neuropathic pain syndrome (non-herpetic)    Under care of palliative care physician    Abdominal pain    Back pain    Chest pain    Bright red blood per rectum/anemia - patient had 1 episode of bloody bowel movement on 11/3  - unclear source of bleeding  - baseline hemoglobin ~9, likely aocd  - discontinue dexamethasone  - NPO  - IV Protonix  - hemoglobin q 8h  - PT PTT  - type and screen  - will discuss discontinuing heparin with senior resident  - continue monitoring for signs of hemodynamic instability    Stage IV adenocarcinoma of lung  Patient diagnosed approximately 4 years ago  Has received chemotherapy and immunotherapy as an outpatient  No treatment this year  Sees Dr Gem Roth as an outpatient  CT PE study showed worsening tumor burden  MRI of brain and C spine overnight showed no mets  MRI for T and L spine to be done outpatient  · Dx 4 yrs ago, CT PE with worsening tumor burden, large right upper lobe mass with compression of the hilum and increased right middle lobe consolidation, possible tumor versus postobstructive atelectasis also right hilar adenopathy, new 5 cm left apical noduleWill obtain MRI of the brain and spine to assess for metastatic disease  · Monitor blood counts and serum electrolytes  · Liver bx with IR on 11/03  · MRI T and L spine to be done outpatient     Adrenal gland/liver metastases  · Patient has extensive tumor burden  Follow-up repeat imaging to better assess tumor burden       Pain of metastatic malignancy with continuous opioid dependence  Patient reports improvement with this pain although he still is not adequately controlled  · Fentanyl drip - plan to convert back to patch prior to discharge  · Acetaminophen 975mg q8 scheduled  · Oxycodone IR for moderate  · hold Dexemethasone 4mg IV BID  · Diazepam 5mg PO q8 scheduled  · Gabapentin 400mg PO Q HS  · Fentanyl  IV Q2 PRN breakthrough   · Continue bowel regimen     Nausea/vomiting  · Continue Phenergan p r n  · Allergic to Zofran           Anxiety/Depression  · Continue lorazepam 0 5 mg IV Q for p r n  · Begin Valium 5 mg q 8h scheduled PO  · Continue Marinol 2 5 mg b i d  Insomnia  Likely multifactorial from pain, nausea, and anxiety  Patient states he has tried Ambien before with no benefit  · Continue treatment for medical issues as outlined above  melatonin 3 mg p o  Q h s  Disposition:  Pending medical stabilization for bright red blood blood per rectum    Subjective:   Patient had no events overnight  Patient denies fevers, chills, headache, vision difficulty, chest pain, shortness of breath, abdominal pain  Patient did have 1 episode of bright red blood per rectum today  Patient was going to be discharged however this is no longer the case given his episode of rectal bleeding  Patient was given his pain medication regimen by palliative and bowel regimen [facility] for [facility]  Vitals: Temp (24hrs), Av 1 °F (36 7 °C), Min:97 8 °F (36 6 °C), Max:98 3 °F (36 8 °C)  Current: Temperature: 97 8 °F (36 6 °C)  Vitals:    17 1500 17 1541 17 2320 17 0726   BP:  124/77 130/76 118/73   Pulse:  104 91 92   Resp:  18 18 18   Temp:  98 3 °F (36 8 °C) 98 2 °F (36 8 °C) 97 8 °F (36 6 °C)   TempSrc:  Oral Oral Oral   SpO2:   96% 96%   Weight:       Height: 6' 3" (1 905 m)       Body mass index is 22 25 kg/m²  I/O last 24 hours: In: 1440 [P O :240;  I V :1200]  Out: -     Physical Exam:  Physical Exam Constitutional: He is oriented to person, place, and time  He appears well-developed and well-nourished  HENT:   Head: Normocephalic and atraumatic  Eyes: EOM are normal  Pupils are equal, round, and reactive to light  Cardiovascular: Normal rate, regular rhythm and normal heart sounds  Pulmonary/Chest: Effort normal and breath sounds normal    Abdominal: Soft  Bowel sounds are normal  He exhibits no distension  There is no tenderness  Neurological: He is alert and oriented to person, place, and time  Skin: Skin is warm and dry  No rash noted  No erythema  Psychiatric: He has a normal mood and affect  His behavior is normal  Judgment and thought content normal      Invasive Devices     Central Venous Catheter Line            Port A Cath 11/04/17 Right Chest less than 1 day                        Labs:   Recent Results (from the past 24 hour(s))   Fingerstick Glucose (POCT)    Collection Time: 11/03/17  5:18 PM   Result Value Ref Range    POC Glucose 102 65 - 140 mg/dl   Fingerstick Glucose (POCT)    Collection Time: 11/03/17  9:47 PM   Result Value Ref Range    POC Glucose 107 65 - 140 mg/dl   Fingerstick Glucose (POCT)    Collection Time: 11/04/17  7:30 AM   Result Value Ref Range    POC Glucose 133 65 - 140 mg/dl   Fingerstick Glucose (POCT)    Collection Time: 11/04/17 11:56 AM   Result Value Ref Range    POC Glucose 140 65 - 140 mg/dl   Hemoglobin and hematocrit, blood    Collection Time: 11/04/17 12:43 PM   Result Value Ref Range    Hemoglobin 9 3 (L) 12 0 - 17 0 g/dL    Hematocrit 28 4 (L) 36 5 - 49 3 %       Radiology Results: I have personally reviewed pertinent reports  Other Diagnostic Testing:   I have personally reviewed pertinent reports        Active Meds:   Current Facility-Administered Medications   Medication Dose Route Frequency    acetaminophen (TYLENOL) tablet 975 mg  975 mg Oral Q8H [address]    calcium carbonate (TUMS) chewable tablet 500 mg  500 mg Oral BID PRN    diazepam (VALIUM) tablet 5 mg  5 mg Oral Q8H    diphenhydrAMINE (BENADRYL) tablet 25 mg  25 mg Oral Q8H PRN    docusate sodium (COLACE) capsule 100 mg  100 mg Oral BID    doxepin (SINEquan) capsule 25 mg  25 mg Oral HS    dronabinol (MARINOL) capsule 2 5 mg  2 5 mg Oral BID before lunch/dinner    fentaNYL (DURAGESIC) 75 mcg/hr TD 72 hr patch 75 mcg  75 mcg Transdermal Q72H    fentanyl citrate (PF) 100 MCG/2ML 50 mcg  50 mcg Intravenous Q2H PRN    gabapentin (NEURONTIN) capsule 400 mg  400 mg Oral HS    heparin (porcine) subcutaneous injection 5,000 Units  5,000 Units Subcutaneous Q8H [facility]    LORazepam (ATIVAN) 2 mg/mL injection 1 mg  1 mg Intravenous Q4H PRN    melatonin tablet 3 mg  3 mg Oral HS    mirtazapine (REMERON) tablet 15 mg  15 mg Oral HS    nicotine (NICODERM CQ) 21 mg/24 hr TD 24 hr patch 21 mg  21 mg Transdermal Daily    oxyCODONE (ROXICODONE) immediate release tablet 20 mg  20 mg Oral Q3H PRN    oxyCODONE (ROXICODONE) IR tablet 15 mg  15 mg Oral Q3H PRN    pantoprazole (PROTONIX) injection 40 mg  40 mg Intravenous Q12H [place]    pneumococcal 13-valent conjugate vaccine (PREVNAR-13) IM injection 0 5 mL  0 5 mL Intramuscular Prior to discharge    polyethylene glycol (MIRALAX) packet 17 g  17 g Oral Daily    prochlorperazine (COMPAZINE) tablet 10 mg  10 mg Oral Q6H PRN    senna (SENOKOT) tablet 17 2 mg  2 tablet Oral BID    white petrolatum-mineral oil (EUCERIN,HYDROCERIN) cream   Topical PRN     VTE Pharmacologic Prophylaxis: Heparin  Will discuss with senior resident regarding discontinuing given brbpr    VTE Mechanical Prophylaxis: sequential compression device    Lissett Grant MD

## 2017-11-05 LAB
ERYTHROCYTE [DISTWIDTH] IN BLOOD BY AUTOMATED COUNT: 17.3 % (ref 11.6–15.1)
HCT VFR BLD AUTO: 29 % (ref 36.5–49.3)
HGB BLD-MCNC: 9.5 G/DL (ref 12–17)
INR PPP: 1.09 (ref 0.86–1.16)
MCH RBC QN AUTO: 19.8 PG (ref 26.8–34.3)
MCHC RBC AUTO-ENTMCNC: 32.8 G/DL (ref 31.4–37.4)
MCV RBC AUTO: 61 FL (ref 82–98)
PLATELET # BLD AUTO: 560 THOUSANDS/UL (ref 149–390)
PMV BLD AUTO: 8.3 FL (ref 8.9–12.7)
PROTHROMBIN TIME: 14.1 SECONDS (ref 12.1–14.4)
RBC # BLD AUTO: 4.79 MILLION/UL (ref 3.88–5.62)
WBC # BLD AUTO: 10.3 THOUSAND/UL (ref 4.31–10.16)

## 2017-11-05 PROCEDURE — C9113 INJ PANTOPRAZOLE SODIUM, VIA: HCPCS | Performed by: INTERNAL MEDICINE

## 2017-11-05 PROCEDURE — 85610 PROTHROMBIN TIME: CPT | Performed by: INTERNAL MEDICINE

## 2017-11-05 PROCEDURE — 85027 COMPLETE CBC AUTOMATED: CPT | Performed by: INTERNAL MEDICINE

## 2017-11-05 RX ORDER — PANTOPRAZOLE SODIUM 40 MG/1
40 TABLET, DELAYED RELEASE ORAL
Status: DISCONTINUED | OUTPATIENT
Start: 2017-11-05 | End: 2017-11-06 | Stop reason: HOSPADM

## 2017-11-05 RX ORDER — LORAZEPAM 2 MG/ML
1 INJECTION INTRAMUSCULAR 2 TIMES DAILY PRN
Status: DISCONTINUED | OUTPATIENT
Start: 2017-11-05 | End: 2017-11-06 | Stop reason: HOSPADM

## 2017-11-05 RX ORDER — DIAZEPAM 5 MG/1
5 TABLET ORAL
Status: DISCONTINUED | OUTPATIENT
Start: 2017-11-05 | End: 2017-11-06 | Stop reason: HOSPADM

## 2017-11-05 RX ORDER — DIAZEPAM 5 MG/1
10 TABLET ORAL
Status: DISCONTINUED | OUTPATIENT
Start: 2017-11-05 | End: 2017-11-06 | Stop reason: HOSPADM

## 2017-11-05 RX ORDER — DRONABINOL 2.5 MG/1
5 CAPSULE ORAL
Status: DISCONTINUED | OUTPATIENT
Start: 2017-11-05 | End: 2017-11-06 | Stop reason: HOSPADM

## 2017-11-05 RX ADMIN — PROCHLORPERAZINE MALEATE 10 MG: 10 TABLET, FILM COATED ORAL at 20:05

## 2017-11-05 RX ADMIN — LORAZEPAM 1 MG: 2 INJECTION INTRAMUSCULAR; INTRAVENOUS at 09:41

## 2017-11-05 RX ADMIN — DRONABINOL 5 MG: 2.5 CAPSULE ORAL at 16:56

## 2017-11-05 RX ADMIN — OXYCODONE HYDROCHLORIDE 20 MG: 10 TABLET ORAL at 03:16

## 2017-11-05 RX ADMIN — PANTOPRAZOLE SODIUM 40 MG: 40 TABLET, DELAYED RELEASE ORAL at 16:56

## 2017-11-05 RX ADMIN — SENNOSIDES 17.2 MG: 8.6 TABLET, FILM COATED ORAL at 09:41

## 2017-11-05 RX ADMIN — DIAZEPAM 10 MG: 5 TABLET ORAL at 22:32

## 2017-11-05 RX ADMIN — DRONABINOL 2.5 MG: 2.5 CAPSULE ORAL at 11:07

## 2017-11-05 RX ADMIN — Medication 500 MG: at 22:32

## 2017-11-05 RX ADMIN — POLYETHYLENE GLYCOL 3350 17 G: 17 POWDER, FOR SOLUTION ORAL at 09:42

## 2017-11-05 RX ADMIN — OXYCODONE HYDROCHLORIDE 20 MG: 10 TABLET ORAL at 11:05

## 2017-11-05 RX ADMIN — OXYCODONE HYDROCHLORIDE 20 MG: 10 TABLET ORAL at 08:03

## 2017-11-05 RX ADMIN — GABAPENTIN 400 MG: 400 CAPSULE ORAL at 22:32

## 2017-11-05 RX ADMIN — OXYCODONE HYDROCHLORIDE 20 MG: 10 TABLET ORAL at 00:04

## 2017-11-05 RX ADMIN — MIRTAZAPINE 15 MG: 15 TABLET, FILM COATED ORAL at 22:32

## 2017-11-05 RX ADMIN — MELATONIN TAB 3 MG 3 MG: 3 TAB at 22:32

## 2017-11-05 RX ADMIN — OXYCODONE HYDROCHLORIDE 20 MG: 10 TABLET ORAL at 20:48

## 2017-11-05 RX ADMIN — PANTOPRAZOLE SODIUM 40 MG: 40 INJECTION, POWDER, FOR SOLUTION INTRAVENOUS at 00:03

## 2017-11-05 RX ADMIN — DOCUSATE SODIUM 100 MG: 100 CAPSULE, LIQUID FILLED ORAL at 09:41

## 2017-11-05 RX ADMIN — PANTOPRAZOLE SODIUM 40 MG: 40 INJECTION, POWDER, FOR SOLUTION INTRAVENOUS at 09:42

## 2017-11-05 RX ADMIN — LORAZEPAM 1 MG: 2 INJECTION INTRAMUSCULAR; INTRAVENOUS at 19:01

## 2017-11-05 RX ADMIN — ACETAMINOPHEN 975 MG: 325 TABLET, FILM COATED ORAL at 05:00

## 2017-11-05 RX ADMIN — Medication 500 MG: at 05:10

## 2017-11-05 RX ADMIN — OXYCODONE HYDROCHLORIDE 20 MG: 10 TABLET ORAL at 23:17

## 2017-11-05 RX ADMIN — SENNOSIDES 17.2 MG: 8.6 TABLET, FILM COATED ORAL at 17:01

## 2017-11-05 RX ADMIN — NICOTINE 21 MG: 21 PATCH, EXTENDED RELEASE TRANSDERMAL at 09:41

## 2017-11-05 RX ADMIN — OXYCODONE HYDROCHLORIDE 20 MG: 10 TABLET ORAL at 16:56

## 2017-11-05 RX ADMIN — DIAZEPAM 5 MG: 5 TABLET ORAL at 05:00

## 2017-11-05 RX ADMIN — DIAZEPAM 5 MG: 5 TABLET ORAL at 13:57

## 2017-11-05 RX ADMIN — OXYCODONE HYDROCHLORIDE 20 MG: 10 TABLET ORAL at 13:57

## 2017-11-05 NOTE — PROGRESS NOTES
The IV pantoprazole has / have been converted to Oral per [facility] for Adults as approved by the Pharmacy and Therapeutics Committee (accessible here on MyNET)       The patient met ALL eligible criteria:  1) Age >= 25years old   2) Received at least one dose of the IV form   3) Receiving at least one other scheduled oral/enteral medication   4) Tolerating an oral/enteral diet     And did NOT have any exclusions:   1) Critical care patient   2) Active GI bleed (IF assessing H2RAs or PPIs) --> LGIB resolved now  3) Continuous tube feeding (IF assessing cipro, doxycycline, levofloxacin, minocycline, rifampin, or voriconazole)   4) Receiving PO vancomycin (IF assessing metronidazole)   5) Persistent nausea and/or vomiting   6) Ileus or gastrointestinal obstruction   7) Celia/nasogastric tube set for continuous suction   8) Specific order not to automatically convert to PO

## 2017-11-05 NOTE — PROGRESS NOTES
Called by patient's nurse  Patient reportedly had another bloody bowel movement  Patient seen and examined at bedside  Per patient, he has always had bloody bowel movements  He has been worked up for this in the past   States that normally occurs when he is constipated, which she has been during this hospitalization  Hemoglobin has remained stable  Patient is otherwise hemodynamically stable  Patient complaining of hunger and would like to eat  Occult blood test was negative today  Will continue to monitor closely  Will discontinue NPO order and start patient on regular diet

## 2017-11-05 NOTE — PROGRESS NOTES
Progress Note - Palliative & Supportive Care  Lizzy Martin  42 y o   male  [facility] 623/[facility] 623-01   MRN: [id number]  Encounter: [id number]     Assessment  Lung cancer  Cancer related pain  Anxiety  Insomnia  Opioid dependent with continuous use  Decreased appetite  Nausea  Constipation    Plan:  Symptom Management:   Cancer pain - continue fentanyl patch at 75mcg and oxy 20mg PRN  Continue use of dexamethasone  Stop acetaminophen as it likely has little analgesic effect given is overall daily OME  Continue gabapentin 400mg qhs  Anxiety - adjust valium to 5mg bid and 10mg qhs  Stop doxepin  Insomnia - increase valium at bedtime  Continue mirtazepine and melatonin    Decreased appetite - on dexamethasone, mirtazepine, and marinol    Constipation - Senna and miralax  Stop colace    Goals of Care:   Plans to work with med/onc as outpt and thinks he will be opting for immunotherapy again  History  Current pain location(s): at biopsy site  Pain Scale:   7  Current Analgesic regimen:  Fentanyl patch, ATC tylenol, dexamethasone, gabapentin  PRN Use of Pain Medications: Oxy 20mg PO x5 and Oxy 15mg x1  Other history: Reports that he has trouble sleeping and gets very anxious  ROS: Pertinent items are noted in HPI      Meds  all current active meds have been reviewed and current meds:   Current Facility-Administered Medications   Medication Dose Route Frequency    acetaminophen (TYLENOL) tablet 975 mg  975 mg Oral Q8H [address]    calcium carbonate (TUMS) chewable tablet 500 mg  500 mg Oral BID PRN    diazepam (VALIUM) tablet 5 mg  5 mg Oral Q8H    docusate sodium (COLACE) capsule 100 mg  100 mg Oral BID    doxepin (SINEquan) capsule 25 mg  25 mg Oral HS    dronabinol (MARINOL) capsule 2 5 mg  2 5 mg Oral BID before lunch/dinner    fentaNYL (DURAGESIC) 75 mcg/hr TD 72 hr patch 75 mcg  75 mcg Transdermal Q72H    gabapentin (NEURONTIN) capsule 400 mg  400 mg Oral HS    melatonin tablet 3 mg  3 mg Oral HS    mirtazapine (REMERON) tablet 15 mg  15 mg Oral HS    nicotine (NICODERM CQ) 21 mg/24 hr TD 24 hr patch 21 mg  21 mg Transdermal Daily    oxyCODONE (ROXICODONE) immediate release tablet 20 mg  20 mg Oral Q3H PRN    pantoprazole (PROTONIX) EC tablet 40 mg  40 mg Oral BID AC    pneumococcal 13-valent conjugate vaccine (PREVNAR-13) IM injection 0 5 mL  0 5 mL Intramuscular Prior to discharge    polyethylene glycol (MIRALAX) packet 17 g  17 g Oral Daily    prochlorperazine (COMPAZINE) tablet 10 mg  10 mg Oral Q6H PRN    senna (SENOKOT) tablet 17 2 mg  2 tablet Oral BID    white petrolatum-mineral oil (EUCERIN,HYDROCERIN) cream   Topical PRN       Allergies   Allergen Reactions    Zofran [Ondansetron] GI Intolerance       Objective  Physical Exam: /85   Pulse 84   Temp 98 3 °F (36 8 °C) (Oral)   Resp 18   Ht 6' 3" (1 905 m)   Wt 80 7 kg (178 lb)   SpO2 97%   BMI 22 25 kg/m²   General appearance: alert, appears stated age and cooperative  Lungs: clear to auscultation bilaterally  Heart: regular rate and rhythm, S1, S2 normal, no murmur, click, rub or gallop  Abdomen: thin  Extremities: extremities normal, atraumatic, no cyanosis or edema  Neurologic: Grossly normal  Lab Results:   I have personally reviewed pertinent labs  , CBC:   Lab Results   Component Value Date    WBC 10 30 (H) 11/05/2017    HGB 9 5 (L) 11/05/2017    HCT 29 0 (L) 11/05/2017    MCV 61 (L) 11/05/2017     (H) 11/05/2017    MCH 19 8 (L) 11/05/2017    MCHC 32 8 11/05/2017    RDW 17 3 (H) 11/05/2017    MPV 8 3 (L) 11/05/2017   , CMP:   Lab Results   Component Value Date     (L) 11/04/2017    K 3 8 11/04/2017     11/04/2017    CO2 28 11/04/2017    ANIONGAP 7 11/04/2017    BUN 16 11/04/2017    CREATININE 0 66 11/04/2017    GLUCOSE 115 11/04/2017    CALCIUM 9 0 11/04/2017    EGFR 141 11/04/2017       Counseling / Coordination of Care  Total floor / unit time spent today 30 minutes       James Keller MD  Palliative [facility]  Office number: [phone]

## 2017-11-05 NOTE — PROGRESS NOTES
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)    Nausea    Neuropathic pain syndrome (non-herpetic)    Under care of palliative care physician    Abdominal pain    Back pain    Chest pain    Bright red blood per rectum    Bright red blood per rectum/anemia - patient reports no futher rectal bleeding  This is a chronic issues for him when he gets constipated  Hgb is stable  - no further work up needed  - regular diet  - bowel regimen to prevent constipation     Stage IV adenocarcinoma of lung  Patient diagnosed approximately 4 years ago  Shruthi Kurtz received chemotherapy and immunotherapy as an outpatient   No treatment this year   Sees Dr Carlos Alberto Paul as an outpatient   CT PE study showed worsening tumor burden   MRI of brain and C spine overnight showed no mets   MRI for L spine to be done outpatient     ·   Dx 4 yrs ago, CT PE with worsening tumor burden, large right upper lobe mass with compression of the hilum and increased right middle lobe consolidation, possible tumor versus postobstructive atelectasis also right hilar adenopathy, new 5 cm left apical noduleWill obtain MRI of the brain and spine to assess for metastatic disease  ·   Monitor blood counts and serum electrolytes  ·   Liver bx with IR on 11/03  ·   MRI L spine to be done outpatient     Adrenal gland/liver metastases  ·   Patient has extensive tumor burden   Follow-up repeat imaging to better assess tumor burden      Pain of metastatic malignancy with continuous opioid dependence  Patient reports improvement with this pain although he still is not adequately controlled  ·   Fentanyl drip - plan to convert back to patch prior to discharge  ·   Acetaminophen 975mg q8 scheduled  ·   Oxycodone IR for moderate  ·   hold Dexemethasone 4mg IV BID  ·   Diazepam 5mg PO q8 scheduled  ·   Gabapentin 400mg PO Q HS  ·   Fentanyl  IV Q2 PRN breakthrough   ·   Continue bowel regimen     Nausea/vomiting  ·   Continue Phenergan p r n  ·   Allergic to Zofran           Anxiety/Depression  ·   Continue lorazepam 0 5 mg IV Q for p r n  ·   Begin Valium 5 mg q 8h scheduled PO  ·   Continue Marinol 2 5 mg b i d      Insomnia  Likely multifactorial from pain, nausea, and anxiety   Patient states he has tried Ambien before with no benefit  ·   Continue treatment for medical issues as outlined above  melatonin 3 mg p o  Q h s  Disposition: Patient is medically stable for discharge  Await arrangements of palliative care for appropriate pain regimen to go home on  Subjective:   Patient reports that he has had no further rectal bleeding  He reports hunger and good appetite  No new complaints today  Vitals: Temp (24hrs), Av 1 °F (36 7 °C), Min:97 6 °F (36 4 °C), Max:98 3 °F (36 8 °C)  Current: Temperature: 98 3 °F (36 8 °C)  Vitals:    17 1500 17 1954 17 2318 17 0722   BP: 123/77  140/95 133/85   Pulse: 87  83 84   Resp: 19  18 18   Temp: 98 3 °F (36 8 °C)  97 6 °F (36 4 °C) 98 3 °F (36 8 °C)   TempSrc: Oral  Oral Oral   SpO2: 96% 96% 97% 97%   Weight:       Height:        Body mass index is 22 25 kg/m²  I/O last 24 hours: In: 1500 [P O :1500]  Out: -       Physical Exam: General appearance: alert, appears stated age and cooperative  Back: symmetric, no curvature  ROM normal  No CVA tenderness    Lungs: clear to auscultation bilaterally  Heart: regular rate and rhythm, S1, S2 normal, no murmur, click, rub or gallop  Extremities: extremities normal, atraumatic, no cyanosis or edema  Skin: Skin color, texture, turgor normal  No rashes or lesions  Neurologic: Grossly normal Invasive Devices     Central Venous Catheter Line            Port A Cath 11/04/17 Right Chest 1 day                          Labs:   Recent Results (from the past 24 hour(s))   Fingerstick Glucose (POCT)    Collection Time: 11/04/17 11:56 AM   Result Value Ref Range    POC Glucose 140 65 - 140 mg/dl   Basic metabolic panel    Collection Time: 11/04/17 12:42 PM   Result Value Ref Range    Sodium 135 (L) 136 - 145 mmol/L    Potassium 3 8 3 5 - 5 3 mmol/L    Chloride 100 100 - 108 mmol/L    CO2 28 21 - 32 mmol/L    Anion Gap 7 4 - 13 mmol/L    BUN 16 5 - 25 mg/dL    Creatinine 0 66 0 60 - 1 30 mg/dL    Glucose 115 65 - 140 mg/dL    Calcium 9 0 8 3 - 10 1 mg/dL    eGFR 141 ml/min/1 73sq m   Hemoglobin and hematocrit, blood    Collection Time: 11/04/17 12:43 PM   Result Value Ref Range    Hemoglobin 9 3 (L) 12 0 - 17 0 g/dL    Hematocrit 28 4 (L) 36 5 - 49 3 %   Type and screen    Collection Time: 11/04/17 12:43 PM   Result Value Ref Range    ABO Grouping A     Rh Factor Positive     Antibody Screen Negative     Specimen Expiration Date [id number]    Protime-INR    Collection Time: 11/04/17 12:43 PM   Result Value Ref Range    Protime 14 1 12 1 - 14 4 seconds    INR 1 09 0 86 - 1 16   Occult blood 1-3, stool    Collection Time: 11/04/17  4:44 PM   Result Value Ref Range    Fecal Occult Blood Diagnostic Negative Negative    Fecal Occult Blood Diagnostic 2  Negative    Fecal Occult Blood Diagnostic 3  Negative   Hemoglobin and hematocrit, blood    Collection Time: 11/04/17  6:12 PM   Result Value Ref Range    Hemoglobin 9 0 (L) 12 0 - 17 0 g/dL    Hematocrit 27 6 (L) 36 5 - 49 3 %   CBC (With Platelets)    Collection Time: 11/05/17  5:02 AM   Result Value Ref Range    WBC 10 30 (H) 4 31 - 10 16 Thousand/uL    RBC 4 79 3 88 - 5 62 Million/uL    Hemoglobin 9 5 (L) 12 0 - 17 0 g/dL    Hematocrit 29 0 (L) 36 5 - 49 3 %    MCV 61 (L) 82 - 98 fL    MCH 19 8 (L) 26 8 - 34 3 pg    MCHC 32 8 31 4 - 37 4 g/dL    RDW 17 3 (H) 11 6 - 15 1 %    Platelets 956 (H) 215 - 390 Thousands/uL    MPV 8 3 (L) 8 9 - 12 7 fL   Protime-INR    Collection Time: 11/05/17  6:01 AM   Result Value Ref Range    Protime 14 1 12 1 - 14 4 seconds    INR 1 09 0 86 - 1 16       Radiology Results: I have personally reviewed pertinent reports  Other Diagnostic Testing:   I have personally reviewed pertinent reports          Active Meds:   Current Facility-Administered Medications   Medication Dose Route Frequency    acetaminophen (TYLENOL) tablet 975 mg  975 mg Oral Q8H [address]    calcium carbonate (TUMS) chewable tablet 500 mg  500 mg Oral BID PRN    diazepam (VALIUM) tablet 5 mg  5 mg Oral Q8H    diphenhydrAMINE (BENADRYL) tablet 25 mg  25 mg Oral Q8H PRN    docusate sodium (COLACE) capsule 100 mg  100 mg Oral BID    doxepin (SINEquan) capsule 25 mg  25 mg Oral HS    dronabinol (MARINOL) capsule 2 5 mg  2 5 mg Oral BID before lunch/dinner    fentaNYL (DURAGESIC) 75 mcg/hr TD 72 hr patch 75 mcg  75 mcg Transdermal Q72H    fentanyl citrate (PF) 100 MCG/2ML 50 mcg  50 mcg Intravenous Q2H PRN    gabapentin (NEURONTIN) capsule 400 mg  400 mg Oral HS    LORazepam (ATIVAN) 2 mg/mL injection 1 mg  1 mg Intravenous Q4H PRN    melatonin tablet 3 mg  3 mg Oral HS    mirtazapine (REMERON) tablet 15 mg  15 mg Oral HS    nicotine (NICODERM CQ) 21 mg/24 hr TD 24 hr patch 21 mg  21 mg Transdermal Daily    oxyCODONE (ROXICODONE) immediate release tablet 20 mg  20 mg Oral Q3H PRN    oxyCODONE (ROXICODONE) IR tablet 15 mg  15 mg Oral Q3H PRN    pantoprazole (PROTONIX) injection 40 mg  40 mg Intravenous Q12H [address]    pneumococcal 13-valent conjugate vaccine (PREVNAR-13) IM injection 0 5 mL  0 5 mL Intramuscular Prior to discharge    polyethylene glycol (MIRALAX) packet 17 g  17 g Oral Daily    prochlorperazine (COMPAZINE) tablet 10 mg  10 mg Oral Q6H PRN    senna (SENOKOT) tablet 17 2 mg  2 tablet Oral BID    white petrolatum-mineral oil (EUCERIN,HYDROCERIN) cream Topical PRN         VTE Pharmacologic Prophylaxis: Heparin  VTE Mechanical Prophylaxis: sequential compression device    Lani Hull DO

## 2017-11-06 VITALS
OXYGEN SATURATION: 98 % | HEIGHT: 75 IN | SYSTOLIC BLOOD PRESSURE: 137 MMHG | HEART RATE: 99 BPM | BODY MASS INDEX: 22.13 KG/M2 | TEMPERATURE: 98.8 F | RESPIRATION RATE: 18 BRPM | DIASTOLIC BLOOD PRESSURE: 71 MMHG | WEIGHT: 178 LBS

## 2017-11-06 RX ORDER — POLYETHYLENE GLYCOL 3350 17 G/17G
17 POWDER, FOR SOLUTION ORAL DAILY PRN
Qty: 14 EACH | Refills: 0 | Status: SHIPPED | OUTPATIENT
Start: 2017-11-06 | End: 2017-11-23

## 2017-11-06 RX ORDER — DEXAMETHASONE 4 MG/1
4 TABLET ORAL DAILY
Status: DISCONTINUED | OUTPATIENT
Start: 2017-11-06 | End: 2017-11-06 | Stop reason: HOSPADM

## 2017-11-06 RX ORDER — DOCUSATE SODIUM 100 MG/1
100 CAPSULE, LIQUID FILLED ORAL 2 TIMES DAILY
Qty: 28 CAPSULE | Refills: 0 | Status: SHIPPED | OUTPATIENT
Start: 2017-11-06 | End: 2017-11-06 | Stop reason: HOSPADM

## 2017-11-06 RX ORDER — DEXAMETHASONE 4 MG/1
4 TABLET ORAL
Qty: 30 TABLET | Refills: 0 | Status: SHIPPED | OUTPATIENT
Start: 2017-11-06 | End: 2017-12-06

## 2017-11-06 RX ADMIN — OXYCODONE HYDROCHLORIDE 20 MG: 10 TABLET ORAL at 02:54

## 2017-11-06 RX ADMIN — NICOTINE 21 MG: 21 PATCH, EXTENDED RELEASE TRANSDERMAL at 08:06

## 2017-11-06 RX ADMIN — Medication 300 UNITS: at 11:53

## 2017-11-06 RX ADMIN — DEXAMETHASONE 4 MG: 4 TABLET ORAL at 10:53

## 2017-11-06 RX ADMIN — OXYCODONE HYDROCHLORIDE 20 MG: 10 TABLET ORAL at 10:53

## 2017-11-06 RX ADMIN — SENNOSIDES 17.2 MG: 8.6 TABLET, FILM COATED ORAL at 08:02

## 2017-11-06 RX ADMIN — DIAZEPAM 5 MG: 5 TABLET ORAL at 08:02

## 2017-11-06 RX ADMIN — OXYCODONE HYDROCHLORIDE 20 MG: 10 TABLET ORAL at 05:27

## 2017-11-06 RX ADMIN — POLYETHYLENE GLYCOL 3350 17 G: 17 POWDER, FOR SOLUTION ORAL at 08:02

## 2017-11-06 RX ADMIN — OXYCODONE HYDROCHLORIDE 20 MG: 10 TABLET ORAL at 08:01

## 2017-11-06 RX ADMIN — DRONABINOL 5 MG: 2.5 CAPSULE ORAL at 10:53

## 2017-11-06 RX ADMIN — PANTOPRAZOLE SODIUM 40 MG: 40 TABLET, DELAYED RELEASE ORAL at 05:30

## 2017-11-06 NOTE — CASE MANAGEMENT
Continued Stay Review    [address] [facility] in the [facility] by Mikey Garay for 2017  Network Utilization Review Department  Phone: [phone]; Fax [phone]  ATTENTION: The Network Utilization Review Department is now centralized for our 7 Facilities  Make a note that we have a new phone and fax numbers for our Department  Please call with any questions or concerns to [phone] and carefully follow the prompts so that you are directed to the right person  All voicemails are confidential  Fax any determinations, approvals, denials, and requests for initial or continue stay review clinical to [phone]  Due to HIGH CALL volume, it would be easier if you could please send faxed requests to expedite your requests and in part, help us provide discharge notifications faster      Date: 11/5/2017    Vital Signs: /71   Pulse 99   Temp 98 8 °F (37 1 °C) (Oral)   Resp 18   Ht 6' 3" (1 905 m)   Wt 80 7 kg (178 lb)   SpO2 98%   BMI 22 25 kg/m²     Medications:   Scheduled Meds:   dexamethasone 4 mg Oral Daily   diazepam 10 mg Oral HS   diazepam 5 mg Oral BID (AM & Afternoon)   dronabinol 5 mg Oral BID before lunch/dinner   fentaNYL 75 mcg Transdermal Q72H   gabapentin 400 mg Oral HS   heparin lock flush 300 Units Intracatheter Once   melatonin 3 mg Oral HS   mirtazapine 15 mg Oral HS   nicotine 21 mg Transdermal Daily   pantoprazole 40 mg Oral BID AC   polyethylene glycol 17 g Oral Daily   senna 2 tablet Oral BID     Continuous Infusions:  NS @ 75ml/hr - d/c'd on 11/4 @ 15:35  PRN Meds:   calcium carbonate po 11/4 x 1 - 11/5 x 2     LORazepam iv 11/4 x 2 - 11/5 x 2     oxyCODONE 20 mg po - 11/4 x 6 - 11/5 x 8 - 11/6 x 4     pneumococcal 13-valent conjugate vaccine    Prochlorperazine po 11/5 x 1     white petrolatum-mineral oil     Nursing orders - VS q 4 - Up and OOB as tolerated - Sequential compression device to le's - Diet regular house Abnormal Labs/Diagnostic Results:      Ref Range & Units 11/5/17 0502 11/4/17 1812   WBC 4 31 - 10 16 Thousand/uL 10 30      RBC 3 88 - 5 62 Million/uL 4 79     Hemoglobin 12 0 - 17 0 g/dL 9 5   9 0     Hematocrit 36 5 - 49 3 % 29 0   27 6     MCV 82 - 98 fL 61      MCH 26 8 - 34 3 pg 19 8      MCHC 31 4 - 37 4 g/dL 32 8     RDW 11 6 - 15 1 % 17 3      Platelets 753 - 501 Thousands/uL 560      MPV 8 9 - 12 7 fL 8 3                 Ref Range & Units 11/4/17 1242 11/2/17 0606   Sodium 136 - 145 mmol/L 135   134     Potassium 3 5 - 5 3 mmol/L 3 8  3 6    Chloride 100 - 108 mmol/L 100  99     CO2 21 - 32 mmol/L 28  27    Anion Gap 4 - 13 mmol/L 7  8    BUN 5 - 25 mg/dL 16  13    Creatinine 0 60 - 1 30 mg/dL 0 66  0 60CM    Glucose 65 - 140 mg/dL 115  91CM    Calcium 8 3 - 10 1 mg/dL 9 0  9 3    eGFR ml/min/1 73sq m 141  146            MRI Thoracic Spine -  10/3 -    Limited examination of the thoracic spine reveals no evidence for cord compression or overt spinal metastases      Age/Sex: 42 y o  male     Assessment/Plan:Bright red blood per rectum/anemia - patient reports no futher rectal bleeding  This is a chronic issues for him when he gets constipated  Hgb is stable  - no further work up needed  - regular diet  - bowel regimen to prevent constipation     Stage IV adenocarcinoma of lung  Patient diagnosed approximately 4 years ago  Alanis Anderson received chemotherapy and immunotherapy as an outpatient   No treatment this year   Sees Dr Sharee Gaviria as an outpatient   CT PE study showed worsening tumor burden   MRI of brain and C spine overnight showed no mets   MRI for L spine to be done outpatient     ·   Dx 4 yrs ago, CT PE with worsening tumor burden, large right upper lobe mass with compression of the hilum and increased right middle lobe consolidation, possible tumor versus postobstructive atelectasis also right hilar adenopathy, new 5 cm left apical noduleWill obtain MRI of the brain and spine to assess for metastatic disease  ·   Monitor blood counts and serum electrolytes  ·   Liver bx with IR on 11/03  ·   MRI L spine to be done outpatient     Adrenal gland/liver metastases  ·   Patient has extensive tumor burden   Follow-up repeat imaging to better assess tumor burden      Pain of metastatic malignancy with continuous opioid dependence  Patient reports improvement with this pain although he still is not adequately controlled  ·   Fentanyl drip - plan to convert back to patch prior to discharge  ·   Acetaminophen 975mg q8 scheduled  ·   Oxycodone IR for moderate  ·   hold Dexemethasone 4mg IV BID  ·   Diazepam 5mg PO q8 scheduled  ·   Gabapentin 400mg PO Q HS  ·   Fentanyl  IV Q2 PRN breakthrough   ·   Continue bowel regimen     Nausea/vomiting  ·   Continue Phenergan p r n  ·   Allergic to Zofran           Anxiety/Depression  ·   Continue lorazepam 0 5 mg IV Q for p r n  ·   Begin Valium 5 mg q 8h scheduled PO  ·   Continue Marinol 2 5 mg b i d      Insomnia  Likely multifactorial from pain, nausea, and anxiety   Patient states he has tried Ambien before with no benefit  ·   Continue treatment for medical issues as outlined above  melatonin 3 mg p o  Q h s       Discharge Plan:  Home on 11/6 with  Spouse

## 2017-11-06 NOTE — DISCHARGE INSTRUCTIONS
Percutaneous Liver Biopsy   WHAT YOU NEED TO KNOW:   A PLB is a procedure to remove a sample of tissue from your liver  The sample can be sent to a lab and tested for liver disease, cancer, or infection  After the procedure you may have pain and bruising at the biopsy site  You may also have pain in your right shoulder  These symptoms should get better in 48 to 72 hours  DISCHARGE INSTRUCTIONS:     Dixie Grady and [facility] patients,  Contact Interventional Radiology at [phone] PATIENTS: Contact Interventional Radiology at [phone]   [facility] PATIENTS: Contact Interventional Radiology at [phone] if:  · You cannot stop the bleeding from your wound even after you hold firm pressure for 10 minutes  · Blood soaks through your bandage  · You have severe pain in your abdomen  · Your abdomen is larger than usual and feels hard  · Your neck is more swollen and you have trouble swallowing  · You feel weak or dizzy  · Your heart is beating faster than usual    · You have a fever or chills  · Your pain does not get better after you take pain medicine  · Your wound is red, swollen, or draining pus  · You have nausea or are vomiting  · Your skin is itchy, swollen, or you have a rash  · You have questions or concerns about your condition or care  Medicines:   · Acetaminophen decreases pain and fever  It is available without a doctor's order  Acetaminophen can cause liver damage if not taken correctly  · Take your home medicine as directed  · Resume your normal diet  Self-care:   · Rest as directed  Do not play sports, exercise, or lift anything heavier than 5 pounds for up to 1 week  · Drink liquids as directed  Liquids will help flush the contrast liquid out of your body  Ask how much liquid to drink each day and which liquids are best for you  · Apply firm, steady pressure if bleeding occurs  A small amount of bleeding from your wound is possible  Apply pressure with a clean gauze or towel for 5 to 10 minutes  Call 911 if bleeding becomes heavy or does not stop  · Ask your healthcare provider when to take your blood thinner or antiplatelet medicine  You may need to wait 24 to 72 hours to take your medicine  This will prevent bleeding  Follow up with your healthcare provider as directed: Write down your questions so you remember to ask them during your visits

## 2017-11-06 NOTE — CASE MANAGEMENT
Notification of Discharge  This is a Notification of Discharge from our facility [address]  Please be advised that this patient has been discharge from our facility  Below you will find the admission and discharge date and time including the patients disposition  PRESENTATION DATE: 10/26/2017  5:51 AM  IP ADMISSION DATE: 10/26/17 0841  DISCHARGE DATE: 11/6/2017 12:16 PM  DISPOSITION: [address] [facility] in the [facility] by Mikey Garay for 2017  Network Utilization Review Department  Phone: [phone]; Fax [phone]  ATTENTION: The Network Utilization Review Department is now centralized for our 7 Facilities  Make a note that we have a new phone and fax numbers for our Department  Please call with any questions or concerns to [phone] and carefully follow the prompts so that you are directed to the right person  All voicemails are confidential  Fax any determinations, approvals, denials, and requests for initial or continue stay review clinical to [phone]  Due to HIGH CALL volume, it would be easier if you could please send faxed requests to expedite your requests and in part, help us provide discharge notifications faster

## 2017-11-06 NOTE — PLAN OF CARE
DISCHARGE PLANNING - CARE MANAGEMENT     Discharge to post-acute care or home with appropriate resources Progressing        Nutrition/Hydration-ADULT     Nutrient/Hydration intake appropriate for improving, restoring or maintaining nutritional needs Progressing        PAIN - ADULT     Verbalizes/displays adequate comfort level or baseline comfort level Progressing        Potential for Falls     Patient will remain free of falls Progressing        RESPIRATORY - ADULT     Achieves optimal ventilation and oxygenation Progressing

## 2017-11-06 NOTE — PROGRESS NOTES
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)    Nausea    Neuropathic pain syndrome (non-herpetic)    Under care of palliative care physician    Abdominal pain    Back pain    Chest pain    Bright red blood per rectum    Bright red blood per rectum/anemia - patient reports no futher rectal bleeding  This is a chronic issues for him when he gets constipated  Hgb is stable  - no further work up needed  - regular diet  - bowel regimen - senna and MiraLax to prevent constipation  Add Colace as needed  Stage IV adenocarcinoma of lung  Patient diagnosed approximately 4 years ago  Has received chemotherapy and immunotherapy as an outpatient  No treatment this year  Sees Dr Quintanilla Diss as an outpatient  CT PE study showed worsening tumor burden  MRI of brain and C spine overnight showed no mets  MRI for L spine to be done outpatient  ·   Monitor blood counts and serum electrolytes  ·   Liver bx with IR on 11/03     Adrenal gland/liver metastases  ·   Patient has extensive tumor burden  Follow-up repeat imaging to better assess tumor burden    MRI L spine to be done in the outpatient     Pain of metastatic malignancy with continuous opioid dependence  Patient reports improvement with this pain although he still is not adequately controlled  · - fentanyl patch 75mcg/hr              - dexamethasone 4mg PO breakfast              - diazepam 5mg PO morning and afternoon, 10mg PO bedtime - scheduled              - dronabinol 2 5mg BID before lunch and dinner - to be stopped at d/c              - gabapentin 400mg q HS              - mirtazapine 15mg q HS              - melatonin 3mg q HS              - senna 2 tab BID, Miralax 17g daily       Nausea/vomiting  ·   Continue Phenergan p r n  ·   Allergic to Zofran           Anxiety/Depression    ·   Begin Valium 5 mg p  o  morning and afternoon, 10 mg p o  bedtime scheduled  ·   Continue Marinol 2 5 mg b i d  DC at the time of discharge     Insomnia  Likely multifactorial from pain, nausea, and anxiety  Patient states he has tried Ambien before with no benefit  ·   Continue treatment for medical issues as outlined above  - continue with melatonin 3 mg p o  Q h s  Disposition:  Patient be discharged home with follow-up in the clinic with Dr Ivett Ramon, Dr Magali Woodson, and the  [facility] for primary care  Subjective:   Patient had no events overnight  Patient's last bloody bowel movement was the day prior  He has had normal bowel movements since then  Patient denies any difficulty with vision, headache, shortness of breath, chest pain, abdominal pain, difficulty urinating or stooling, fevers, nausea, chills  Patient understands that he will be discharged today and is comfortable with the plan  Vitals: Temp (24hrs), Av 5 °F (36 9 °C), Min:98 3 °F (36 8 °C), Max:98 8 °F (37 1 °C)  Current: Temperature: 98 8 °F (37 1 °C)  Vitals:    17 0722 17 1657 17 0031 17 0700   BP: 133/85 121/79 100/57 137/71   Pulse: 84 98 88 99   Resp: 18  18   Temp: 98 3 °F (36 8 °C) 98 4 °F (36 9 °C) 98 3 °F (36 8 °C) 98 8 °F (37 1 °C)   TempSrc: Oral Oral Oral Oral   SpO2: 97% 100% 96% 98%   Weight:       Height:        Body mass index is 22 25 kg/m²  I/O last 24 hours: In: 18 [P O :480]  Out: -       Physical Exam:   Physical Exam   Constitutional: He is oriented to person, place, and time  He appears well-nourished  HENT:   Head: Normocephalic and atraumatic  Eyes: EOM are normal  Pupils are equal, round, and reactive to light     Cardiovascular: Normal rate, regular rhythm and normal heart sounds  No murmur heard  Slight heave   Pulmonary/Chest: Effort normal and breath sounds normal  No respiratory distress  He has no wheezes  Abdominal: Soft  Bowel sounds are normal  He exhibits no distension  There is no tenderness  Neurological: He is alert and oriented to person, place, and time  Skin: Skin is warm and dry  No rash noted  No erythema  Psychiatric: He has a normal mood and affect  His behavior is normal  Judgment and thought content normal          Invasive Devices          No matching active lines, drains, or airways                    Labs: No results found for this or any previous visit (from the past 24 hour(s))  Radiology Results: I have personally reviewed pertinent reports  Other Diagnostic Testing:   I have personally reviewed pertinent reports          Active Meds:   Current Facility-Administered Medications   Medication Dose Route Frequency    calcium carbonate (TUMS) chewable tablet 500 mg  500 mg Oral BID PRN    dexamethasone (DECADRON) tablet 4 mg  4 mg Oral Daily    diazepam (VALIUM) tablet 10 mg  10 mg Oral HS    diazepam (VALIUM) tablet 5 mg  5 mg Oral BID (AM & Afternoon)    dronabinol (MARINOL) capsule 5 mg  5 mg Oral BID before lunch/dinner    fentaNYL (DURAGESIC) 75 mcg/hr TD 72 hr patch 75 mcg  75 mcg Transdermal Q72H    gabapentin (NEURONTIN) capsule 400 mg  400 mg Oral HS    LORazepam (ATIVAN) 2 mg/mL injection 1 mg  1 mg Intravenous BID PRN    melatonin tablet 3 mg  3 mg Oral HS    mirtazapine (REMERON) tablet 15 mg  15 mg Oral HS    nicotine (NICODERM CQ) 21 mg/24 hr TD 24 hr patch 21 mg  21 mg Transdermal Daily    oxyCODONE (ROXICODONE) immediate release tablet 20 mg  20 mg Oral Q3H PRN    pantoprazole (PROTONIX) EC tablet 40 mg  40 mg Oral BID AC    pneumococcal 13-valent conjugate vaccine (PREVNAR-13) IM injection 0 5 mL  0 5 mL Intramuscular Prior to discharge    polyethylene glycol (MIRALAX) packet 17 g  17 g Oral Daily    prochlorperazine (COMPAZINE) tablet 10 mg  10 mg Oral Q6H PRN    senna (SENOKOT) tablet 17 2 mg  2 tablet Oral BID    white petrolatum-mineral oil (EUCERIN,HYDROCERIN) cream   Topical PRN         VTE Pharmacologic Prophylaxis: Sequential compression device (Venodyne)   VTE Mechanical Prophylaxis: sequential compression device    Concepcion Quick MD

## 2017-11-06 NOTE — PROGRESS NOTES
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)    Nausea    Neuropathic pain syndrome (non-herpetic)    Elevated rheumatoid factor    Under care of palliative care physician    Abdominal pain    Back pain    Chest pain    Bright red blood per rectum       Plan:  Symptom Management: cancer-related pain stable, anxiety stable  Patient to follow up with Dr Magali Woodson in 1-2 weeks after d/c  Prior authorizations for fentanyl patch and oxycodone were approved and patient will be able to receive refills  - fentanyl patch 75mcg/hr   - dexamethasone 4mg PO breakfast   - diazepam 5mg PO morning and afternoon, 10mg PO bedtime - scheduled   - dronabinol 2 5mg BID before lunch and dinner - to be stopped at d/c   - gabapentin 400mg q HS   - mirtazapine 15mg q HS   - melatonin 3mg q HS   - senna 2 tab BID, Miralax 17g daily    Goals of Care: Life-prolonging    - treatment-focused plan of care with goal of living longer   - level 1 full code    History  Pain is controlled today  Looking forward to going home  Wife present in room  Otherwise feeling well   Seen eating breakfast     - Current Analgesic regimen:  Fentanyl patch, PRN oxycodone IR, dexamethasone  - PRN Use of Pain Medications in past 24h:  8 doses of oxycodone IR, 2 doses of IV fentanyl      Meds  all current active meds have been reviewed and current meds:   Current Facility-Administered Medications   Medication Dose Route Frequency    calcium carbonate (TUMS) chewable tablet 500 mg  500 mg Oral BID PRN    diazepam (VALIUM) tablet 10 mg  10 mg Oral HS    diazepam (VALIUM) tablet 5 mg  5 mg Oral BID (AM & Afternoon)    dronabinol (MARINOL) capsule 5 mg  5 mg Oral BID before lunch/dinner    fentaNYL (DURAGESIC) 75 mcg/hr TD 72 hr patch 75 mcg  75 mcg Transdermal Q72H    gabapentin (NEURONTIN) capsule 400 mg  400 mg Oral HS    LORazepam (ATIVAN) 2 mg/mL injection 1 mg  1 mg Intravenous BID PRN    melatonin tablet 3 mg  3 mg Oral HS    mirtazapine (REMERON) tablet 15 mg  15 mg Oral HS    nicotine (NICODERM CQ) 21 mg/24 hr TD 24 hr patch 21 mg  21 mg Transdermal Daily    oxyCODONE (ROXICODONE) immediate release tablet 20 mg  20 mg Oral Q3H PRN    pantoprazole (PROTONIX) EC tablet 40 mg  40 mg Oral BID AC    pneumococcal 13-valent conjugate vaccine (PREVNAR-13) IM injection 0 5 mL  0 5 mL Intramuscular Prior to discharge    polyethylene glycol (MIRALAX) packet 17 g  17 g Oral Daily    prochlorperazine (COMPAZINE) tablet 10 mg  10 mg Oral Q6H PRN    senna (SENOKOT) tablet 17 2 mg  2 tablet Oral BID    white petrolatum-mineral oil (EUCERIN,HYDROCERIN) cream   Topical PRN       Allergies   Allergen Reactions    Zofran [Ondansetron] GI Intolerance       Objective  Physical Exam   Constitutional: He is oriented to person, place, and time  He is cooperative  No distress  fatigued   Pulmonary/Chest: Effort normal    Neurological: He is alert and oriented to person, place, and time  Skin: Skin is warm and dry  Psychiatric: He has a normal mood and affect  Cognition and memory are normal        Lab Results: I have personally reviewed pertinent labs        Sierra Mcnamara, [address] [facility]  Office number: [phone]

## 2017-11-09 ENCOUNTER — APPOINTMENT (EMERGENCY)
Dept: RADIOLOGY | Facility: HOSPITAL | Age: 37
End: 2017-11-09
Payer: COMMERCIAL

## 2017-11-09 ENCOUNTER — HOSPITAL ENCOUNTER (EMERGENCY)
Facility: HOSPITAL | Age: 37
Discharge: LEFT BEFORE DISCHARGE | End: 2017-11-09
Attending: EMERGENCY MEDICINE
Payer: COMMERCIAL

## 2017-11-09 VITALS
TEMPERATURE: 98.5 F | SYSTOLIC BLOOD PRESSURE: 128 MMHG | OXYGEN SATURATION: 96 % | DIASTOLIC BLOOD PRESSURE: 93 MMHG | HEART RATE: 110 BPM | RESPIRATION RATE: 26 BRPM

## 2017-11-09 DIAGNOSIS — Z53.29 LEFT AGAINST MEDICAL ADVICE: Primary | ICD-10-CM

## 2017-11-09 PROCEDURE — 99285 EMERGENCY DEPT VISIT HI MDM: CPT

## 2017-11-09 PROCEDURE — 93005 ELECTROCARDIOGRAM TRACING: CPT | Performed by: EMERGENCY MEDICINE

## 2017-11-09 RX ORDER — DIPHENHYDRAMINE HCL 12.5MG/5ML
50 LIQUID (ML) ORAL ONCE
Status: DISCONTINUED | OUTPATIENT
Start: 2017-11-09 | End: 2017-11-09 | Stop reason: HOSPADM

## 2017-11-09 RX ORDER — FENTANYL CITRATE 50 UG/ML
100 INJECTION, SOLUTION INTRAMUSCULAR; INTRAVENOUS ONCE
Status: DISCONTINUED | OUTPATIENT
Start: 2017-11-09 | End: 2017-11-09 | Stop reason: HOSPADM

## 2017-11-10 LAB
ATRIAL RATE: 107 BPM
P AXIS: 99 DEGREES
PR INTERVAL: 122 MS
QRS AXIS: 112 DEGREES
QRSD INTERVAL: 80 MS
QT INTERVAL: 332 MS
QTC INTERVAL: 443 MS
T WAVE AXIS: 26 DEGREES
VENTRICULAR RATE: 107 BPM

## 2017-11-10 NOTE — ED NOTES
Patient stated that his wife is on her way in with his Oxycodone because the MD told him to bring them because he was not sure if we would give him that total amount   I informed patient that he can not take his own oxycodone while here and he is to talk to the MD about the order     Rj Hall RN  11/09/17 8140

## 2017-11-10 NOTE — ED NOTES
Patient refused Xray  MD notified and patient was was educated on why he needed to have the xray  Patients wife was in the room and started yelling at nurse saying "You all have no compassion in this hospital, we want to leave" Patient and wife stated "We don't want the xray"Nurse also went to medicate patient and when she handed him the benedryl he and his wife started yelling at nurse saying "We told him that I cant take benedryl" Patient started flopping around in bed and jumping up and down  The wife then said "Come on Jamie Brow lets get out of here they are horrible here"  Patient said "I just came here for the pain medication"  MD notified that patient and his wife insisted on leaving  MD stated to fill out the AMA and have patient sign  Nurse had patient sign the AMA paperwork and patient was educated on the consequences of leaving  Patient said "That's fine, I will go somewhere else for pain"  Nurse took out patients IV and leads off and patient left        Chikis Nesbitt RN  11/09/17 2013

## 2017-11-10 NOTE — ED PROVIDER NOTES
History  Chief Complaint   Patient presents with    Flank Pain     Patient stated that he is having Flank pain where he had a liver biopsy done at [facility] Diarrhea     Patient stated that he took Lactulose along with Dulcolax and Senna    Shortness of Breath     Patient stated that he can not take a deep breath in  Patient has stage 4 lung CA in both lungs     26-year-old male patient presents emergency department with body wide pain presumptively due to his stage IV metastatic cancer  Upon initial arrival in the patient's room the patient is bent over the bed in a great deal of distress  Patient is able to speak in full and interrupted sentences without pausing to breathe  Patient stated that his pain was out of control, that is home medications were not working for this, and that he had some sort of a recent biopsy done but could not explain exactly what the biopsy was  Past patient would normally worse wrist pain the patient stated that fentanyl was the pain medication of choice  It was noted that the patient was wearing a 75 sloan per hour fentanyl, Duragesic patch  I told the patient was given 100 mcg of fentanyl, would returned to his room after pain medication had taken effect so that I could do a more thorough assessment  A short time after I saw the patient the patient's nurse came to me and stated that the patient was leaving the room against medical advice  There is no explanation given to the patient's nurse as to why this was having  I was not able to get back to the patient's room and enough time to discuss with him his reasons for living  There is some question as to whether or not the patient is being given a medication that he was allergic to however is only be allergy at the time of my assessment of the patient was Zofran  I offered him anything else for the nausea he did not want any    I did put and Benadryl has an order for prophylaxis should he become nauseous or have any itching secondary to the administration of the medication  By the time I did get a chance to go to the patient's room express my concern that he was leaving against medical advice patient had already left  History provided by:  Patient   used: No    Flank Pain   Pain location:  Generalized  Pain quality: sharp and shooting    Pain radiates to:  Does not radiate  Pain severity:  Moderate  Onset quality:  Gradual  Timing:  Constant  Progression:  Worsening  Relieved by:  Position changes  Worsened by:  Nothing  Ineffective treatments: home opioids were not working for him  Associated symptoms: diarrhea    Diarrhea   Shortness of Breath       Prior to Admission Medications   Prescriptions Last Dose Informant Patient Reported? Taking?    LACTULOSE PO 11/9/2017 at Unknown time  Yes Yes   Sig: Take 10 mg by mouth daily   Melatonin 3 MG CAPS 11/8/2017 at Unknown time  No Yes   Sig: Take 3 mg by mouth daily at bedtime for 30 days   dexamethasone (DECADRON) 4 mg tablet 11/9/2017 at Unknown time  No Yes   Sig: Take 1 tab (4mg) with breakfast and 1/2 tab (2mg) with lunch PO - take with food   dexamethasone (DECADRON) 4 mg tablet 11/9/2017 at Unknown time  No Yes   Sig: Take 1 tablet by mouth daily with breakfast for 30 days   diazepam (VALIUM) 5 mg tablet 11/9/2017 at Unknown time  No Yes   Sig: Take 1 tablet by mouth every 8 (eight) hours   fentaNYL (DURAGESIC) 75 mcg/hr 11/9/2017 at Unknown time  No Yes   Sig: Place 1 patch on the skin every third day Max Daily Amount: 1 patch   gabapentin (NEURONTIN) 400 mg capsule 11/9/2017 at Unknown time  No Yes   Sig: Take 1 capsule by mouth daily at bedtime   oxyCODONE (ROXICODONE) 20 MG TABS 11/9/2017 at Unknown time  No Yes   Sig: Take 1 tablet by mouth every 3 (three) hours as needed for severe pain Max Daily Amount: 160 mg   polyethylene glycol (MIRALAX) 17 g packet 11/9/2017 at Unknown time  No Yes   Sig: Take 17 g by mouth daily as needed (bowel regimen secondary to pain medication regimen of opiates)   senna (SENOKOT) 8 6 mg 11/9/2017 at Unknown time  No Yes   Sig: Take 2 tablets by mouth 2 (two) times a day for 14 days      Facility-Administered Medications: None       Past Medical History:   Diagnosis Date    Anxiety     GERD (gastroesophageal reflux disease)     Lung cancer (HCC)     B/L Lung CA stage 4       Past Surgical History:   Procedure Laterality Date    PORTACATH PLACEMENT         History reviewed  No pertinent family history  I have reviewed and agree with the history as documented  Social History   Substance Use Topics    Smoking status: Former Smoker     Quit date: 12/1/2015    Smokeless tobacco: Never Used    Alcohol use No        Review of Systems   Gastrointestinal: Positive for diarrhea  Genitourinary: Positive for flank pain  Physical Exam  ED Triage Vitals [11/09/17 1852]   Temperature Pulse Respirations Blood Pressure SpO2   98 5 °F (36 9 °C) (!) 108 (!) 26 128/93 97 %      Temp Source Heart Rate Source Patient Position - Orthostatic VS BP Location FiO2 (%)   Oral -- Lying Right arm --      Pain Score       --           Orthostatic Vital Signs  Vitals:    11/09/17 1852 11/09/17 1900   BP: 128/93 128/93   Pulse: (!) 108 (!) 110   Patient Position - Orthostatic VS: Lying        Physical Exam   Constitutional: He appears well-developed and well-nourished  HENT:   Head: Normocephalic and atraumatic  Eyes: Right eye exhibits no discharge  Left eye exhibits no discharge  Neck: No JVD present  No tracheal deviation present  Pulmonary/Chest: Effort normal  No stridor  No respiratory distress  Abdominal: He exhibits no distension and no mass  Neurological: No cranial nerve deficit   Coordination normal        ED Medications  Medications - No data to display    Diagnostic Studies  Results Reviewed     None                 No orders to display              Procedures  Procedures       Phone Contacts  ED Phone Contact    ED Course  ED Course                                MDM  Number of Diagnoses or Management Options  Left against medical advice: new and requires workup     Amount and/or Complexity of Data Reviewed  Clinical lab tests: ordered  Tests in the radiology section of CPT®: ordered  Decide to obtain previous medical records or to obtain history from someone other than the patient: yes  Review and summarize past medical records: yes    Patient Progress  Patient progress: other (comment)    CritCare Time    Disposition  Final diagnoses:   Left against medical advice     Time reflects when diagnosis was documented in both MDM as applicable and the Disposition within this note     Time User Action Codes Description Comment    11/10/2017  2:46 PM David Ochoa Add [Z53 20] Left against medical advice       ED Disposition     ED Disposition Condition Comment    AMA        Follow-up Information    None       Discharge Medication List as of 11/9/2017  8:36 PM      CONTINUE these medications which have NOT CHANGED    Details   !! dexamethasone (DECADRON) 4 mg tablet Take 1 tab (4mg) with breakfast and 1/2 tab (2mg) with lunch PO - take with food, Print      !! dexamethasone (DECADRON) 4 mg tablet Take 1 tablet by mouth daily with breakfast for 30 days, Starting Mon 11/6/2017, Until Wed 12/6/2017, Print      diazepam (VALIUM) 5 mg tablet Take 1 tablet by mouth every 8 (eight) hours, Starting Fri 11/3/2017, Print      fentaNYL (DURAGESIC) 75 mcg/hr Place 1 patch on the skin every third day Max Daily Amount: 1 patch, Starting Mon 11/6/2017, Print      gabapentin (NEURONTIN) 400 mg capsule Take 1 capsule by mouth daily at bedtime, Starting Fri 11/3/2017, Print      LACTULOSE PO Take 10 mg by mouth daily, Historical Med      Melatonin 3 MG CAPS Take 3 mg by mouth daily at bedtime for 30 days, Starting Mon 11/6/2017, Until Wed 12/6/2017, Print      oxyCODONE (ROXICODONE) 20 MG TABS Take 1 tablet by mouth every 3 (three) hours as needed for severe pain Max Daily Amount: 160 mg, Starting Fri 11/3/2017, Print      polyethylene glycol (MIRALAX) 17 g packet Take 17 g by mouth daily as needed (bowel regimen secondary to pain medication regimen of opiates), Starting Mon 11/6/2017, Print      senna (SENOKOT) 8 6 mg Take 2 tablets by mouth 2 (two) times a day for 14 days, Starting Sat 11/4/2017, Until Sat 11/18/2017, Print       !! - Potential duplicate medications found  Please discuss with provider  No discharge procedures on file      ED Provider  Electronically Signed by           Tyrone Chery DO  11/10/17 3023

## 2017-11-13 ENCOUNTER — GENERIC CONVERSION - ENCOUNTER (OUTPATIENT)
Dept: OTHER | Facility: OTHER | Age: 37
End: 2017-11-13

## 2017-11-14 ENCOUNTER — APPOINTMENT (OUTPATIENT)
Dept: LAB | Facility: HOSPITAL | Age: 37
End: 2017-11-14
Attending: INTERNAL MEDICINE
Payer: COMMERCIAL

## 2017-11-14 DIAGNOSIS — R60.9 EDEMA: ICD-10-CM

## 2017-11-14 LAB — DEPRECATED D DIMER PPP: 2854 NG/ML (FEU) (ref 0–424)

## 2017-11-14 PROCEDURE — 85379 FIBRIN DEGRADATION QUANT: CPT

## 2017-11-14 PROCEDURE — 36415 COLL VENOUS BLD VENIPUNCTURE: CPT

## 2017-11-16 ENCOUNTER — ALLSCRIPTS OFFICE VISIT (OUTPATIENT)
Dept: OTHER | Facility: OTHER | Age: 37
End: 2017-11-16

## 2017-11-16 ENCOUNTER — HOSPITAL ENCOUNTER (OUTPATIENT)
Dept: RADIOLOGY | Facility: HOSPITAL | Age: 37
Discharge: HOME | End: 2017-11-16
Payer: COMMERCIAL

## 2017-11-16 ENCOUNTER — TRANSCRIBE ORDERS (OUTPATIENT)
Dept: ADMINISTRATIVE | Facility: HOSPITAL | Age: 37
End: 2017-11-16

## 2017-11-16 DIAGNOSIS — C34.91 PRIMARY LUNG CANCER WITH METASTASIS FROM LUNG TO OTHER SITE, RIGHT (HCC): Primary | ICD-10-CM

## 2017-11-16 DIAGNOSIS — R06.02 SHORTNESS OF BREATH: ICD-10-CM

## 2017-11-16 DIAGNOSIS — R79.89 ELEVATED D-DIMER: ICD-10-CM

## 2017-11-16 DIAGNOSIS — C34.91 PRIMARY LUNG CANCER WITH METASTASIS FROM LUNG TO OTHER SITE, RIGHT (HCC): ICD-10-CM

## 2017-11-16 PROCEDURE — 71275 CT ANGIOGRAPHY CHEST: CPT

## 2017-11-16 RX ADMIN — IOHEXOL 85 ML: 350 INJECTION, SOLUTION INTRAVENOUS at 15:54

## 2017-11-17 ENCOUNTER — HOSPITAL ENCOUNTER (OUTPATIENT)
Dept: ULTRASOUND IMAGING | Facility: HOSPITAL | Age: 37
Discharge: HOME | End: 2017-11-17
Payer: COMMERCIAL

## 2017-11-17 ENCOUNTER — TRANSCRIBE ORDERS (OUTPATIENT)
Dept: ADMINISTRATIVE | Facility: HOSPITAL | Age: 37
End: 2017-11-17

## 2017-11-17 DIAGNOSIS — R60.9 EDEMA, UNSPECIFIED TYPE: Primary | ICD-10-CM

## 2017-11-17 DIAGNOSIS — R60.9 EDEMA: ICD-10-CM

## 2017-11-17 PROCEDURE — 93970 EXTREMITY STUDY: CPT

## 2017-11-23 ENCOUNTER — HOSPITAL ENCOUNTER (EMERGENCY)
Facility: HOSPITAL | Age: 37
Discharge: HOME | End: 2017-11-23
Attending: EMERGENCY MEDICINE | Admitting: EMERGENCY MEDICINE
Payer: COMMERCIAL

## 2017-11-23 VITALS
OXYGEN SATURATION: 99 % | BODY MASS INDEX: 22.25 KG/M2 | WEIGHT: 178 LBS | HEART RATE: 103 BPM | DIASTOLIC BLOOD PRESSURE: 82 MMHG | RESPIRATION RATE: 20 BRPM | TEMPERATURE: 97.7 F | SYSTOLIC BLOOD PRESSURE: 122 MMHG

## 2017-11-23 DIAGNOSIS — G89.29 CHRONIC PAIN: Primary | ICD-10-CM

## 2017-11-23 DIAGNOSIS — K59.00 CONSTIPATION: ICD-10-CM

## 2017-11-23 PROCEDURE — 96372 THER/PROPH/DIAG INJ SC/IM: CPT

## 2017-11-23 PROCEDURE — 99283 EMERGENCY DEPT VISIT LOW MDM: CPT

## 2017-11-23 RX ORDER — HYDROMORPHONE HCL 110MG/55ML
2 PATIENT CONTROLLED ANALGESIA SYRINGE INTRAVENOUS ONCE
Status: COMPLETED | OUTPATIENT
Start: 2017-11-23 | End: 2017-11-23

## 2017-11-23 RX ORDER — HYDROMORPHONE HCL 110MG/55ML
2 PATIENT CONTROLLED ANALGESIA SYRINGE INTRAVENOUS ONCE
Status: DISCONTINUED | OUTPATIENT
Start: 2017-11-23 | End: 2017-11-23

## 2017-11-23 RX ORDER — MIRTAZAPINE 15 MG/1
15 TABLET, FILM COATED ORAL
COMMUNITY
End: 2017-12-04 | Stop reason: HOSPADM

## 2017-11-23 RX ORDER — FUROSEMIDE 40 MG/1
40 TABLET ORAL DAILY PRN
COMMUNITY

## 2017-11-23 RX ORDER — DIAZEPAM 10 MG/1
5 TABLET ORAL EVERY EVENING
COMMUNITY
End: 2017-12-04 | Stop reason: HOSPADM

## 2017-11-23 RX ORDER — PANTOPRAZOLE SODIUM 40 MG/1
40 TABLET, DELAYED RELEASE ORAL DAILY
COMMUNITY

## 2017-11-23 RX ORDER — ACETAMINOPHEN 500 MG
1000 TABLET ORAL EVERY 8 HOURS
COMMUNITY

## 2017-11-23 RX ORDER — NICOTINE 21 MG/24HR
1 PATCH, TRANSDERMAL 24 HOURS TRANSDERMAL EVERY 24 HOURS
COMMUNITY

## 2017-11-23 RX ORDER — LACTULOSE 20 G/30ML
30 SOLUTION ORAL DAILY
Status: ON HOLD | COMMUNITY
End: 2017-12-04

## 2017-11-23 RX ADMIN — HYDROMORPHONE HYDROCHLORIDE 2 MG: 2 INJECTION, SOLUTION INTRAMUSCULAR; INTRAVENOUS; SUBCUTANEOUS at 08:41

## 2017-11-23 NOTE — DISCHARGE INSTRUCTIONS
Constipation   WHAT YOU NEED TO KNOW:   Constipation is when you have hard, dry bowel movements, or you go longer than usual between bowel movements  DISCHARGE INSTRUCTIONS:   Return to the emergency department if:   · You have blood in your bowel movements  · You have a fever and abdominal pain with the constipation  Contact your healthcare provider if:   · Your constipation gets worse  · You start to vomit  · You have questions or concerns about your condition or care  Medicines:   · Medicine or a fiber supplement  may help make your bowel movement softer  A laxative may help relax and loosen your intestines to help you have a bowel movement  You may also be given medicine to increase fluid in your intestines  The fluid may help move bowel movements through your intestines  · Take your medicine as directed  Contact your healthcare provider if you think your medicine is not helping or if you have side effects  Tell him of her if you are allergic to any medicine  Keep a list of the medicines, vitamins, and herbs you take  Include the amounts, and when and why you take them  Bring the list or the pill bottles to follow-up visits  Carry your medicine list with you in case of an emergency  Manage your constipation:   · Drink liquids as directed  You may need to drink extra liquids to help soften and move your bowels  Ask how much liquid to drink each day and which liquids are best for you  · Eat high-fiber foods  This may help decrease constipation by adding bulk to your bowel movements  High-fiber foods include fruit, vegetables, whole-grain breads and cereals, and beans  Your healthcare provider or dietitian can help you create a high-fiber meal plan  · Exercise regularly  Regular physical activity can help stimulate your intestines  Ask which exercises are best for you  · Schedule a time each day to have a bowel movement    This may help train your body to have regular bowel movements  Bend forward while you are on the toilet to help move the bowel movement out  Sit on the toilet for at least 10 minutes, even if you do not have a bowel movement  Follow up with your healthcare provider as directed:  Write down your questions so you remember to ask them during your visits  © 2017 [employer] Information is for End User's use only and may not be sold, redistributed or otherwise used for commercial purposes  All illustrations and images included in CareNotes® are the copyrighted property of [employer]  or [employer]  The above information is an  only  It is not intended as medical advice for individual conditions or treatments  Talk to your doctor, nurse or pharmacist before following any medical regimen to see if it is safe and effective for you  Chronic Pain   WHAT YOU NEED TO KNOW:   Chronic pain is pain that does not get better for 3 months or longer  Chronic pain may hurt all the time, or come and go  DISCHARGE INSTRUCTIONS:   Call 911 or have someone call 911 for any of the following:   · You are breathing slower than normal, or you have trouble breathing  · You cannot be awakened  · You have a seizure  Return to the emergency department if:   · Your heart is beating slower than normal     · Your heart feels like it is jumping or fluttering  · You cannot think clearly  Contact your healthcare provider if:   · You have side effects from prescription pain medicine, such as itching, nausea, or vomiting  · You have trouble sleeping  · Your pain gets worse, even after you take medicine  · You don't think the medicine is working  · You have questions or concerns about your condition or care  Medicines: You may need any of the following:  · Acetaminophen  decreases pain  It is available without a doctor's order  Ask how much to take and how often to take it  Follow directions   Read the labels of all other medicines you are using to see if they also contain acetaminophen, or ask your doctor or pharmacist  Acetaminophen can cause liver damage if not taken correctly  Do not use more than 4 grams (4,000 milligrams) total of acetaminophen in one day  · NSAIDs , such as ibuprofen, help decrease swelling, pain, and fever  This medicine is available with or without a doctor's order  NSAIDs can cause stomach bleeding or kidney problems in certain people  If you take blood thinner medicine, always ask your healthcare provider if NSAIDs are safe for you  Always read the medicine label and follow directions  · Prescription pain medicine  called narcotics or opioids may be given  Ask your healthcare provider how to take this medicine safely  · Anesthetics  can be rubbed on your skin or injected into a nerve or muscle to numb an area  · Other medicines  may reduce pain, anxiety, muscle tension, or swelling  · Take your medicine as directed  Contact your healthcare provider if you think your medicine is not helping or if you have side effects  Tell him of her if you are allergic to any medicine  Keep a list of the medicines, vitamins, and herbs you take  Include the amounts, and when and why you take them  Bring the list or the pill bottles to follow-up visits  Carry your medicine list with you in case of an emergency  Manage your chronic pain:   · Apply heat  on the area in pain for 20 to 30 minutes every 2 hours for as many days as directed  Heat helps decrease pain and muscle spasms  · Apply ice  on the part of your body that hurts for 15 to 20 minutes every hour or as directed  Use an ice pack, or put crushed ice in a plastic bag  Cover it with a towel  Ice decreases pain and swelling, and helps prevent tissue damage  · Go to physical therapy as directed  A physical therapist teaches you exercises to help improve movement and strength, and to decrease pain  · Exercise for 30 minutes, 3 times a week  Regular physical activity can help decrease pain and improve your quality of life  Ask your healthcare provider about the best exercise plan for your type of pain  · Get enough sleep  Create a relaxing bedtime routine  Go to sleep and wake up at the same time every day  Avoid caffeine in the afternoon  · Talk with a counselor or therapist   A type of counseling called cognitive behavioral therapy (CBT) can help your chronic pain by changing the way you think about it  CBT can also improve your mood, sleep, and ability to move  What you must know if you take narcotic pain medicine:   · You may need to take a bowel movement softener  The most common side effect of prescription pain medicine is constipation  Bowel movement softeners are available over the counter  · Do not mix prescription pain medicines  This can cause an overdose of medicine, which can become life-threatening  Read labels  Make sure you know the ingredients in all of your medicines  · Do not drink alcohol  when you take prescription pain medicine  It is not safe to mix narcotics or opioids with alcohol or illegal drugs  · Prescription pain medicine may impair your ability to drive or work safely  They may also cause dizziness and increase your risk for falling  · Store prescription pain medicine in a safe location at home  Keep your medicine away from children and other people  Never share your medicine with anyone  Follow up with your healthcare provider as directed: You may be referred to a pain specialist  Write down your questions so you remember to ask them during your visits  © 2017 [employer] Information is for End User's use only and may not be sold, redistributed or otherwise used for commercial purposes  All illustrations and images included in CareNotes® are the copyrighted property of [employer]  or [employer]  The above information is an  only   It is not intended as medical advice for individual conditions or treatments  Talk to your doctor, nurse or pharmacist before following any medical regimen to see if it is safe and effective for you

## 2017-11-23 NOTE — ED PROVIDER NOTES
History  Chief Complaint   Patient presents with    Pain     patient states that he has stage 4 lung cancer and recently underwent "some sort of biopsy"  Patient has had increased pain on the right side of his body  The patient is a 22-year-old male with stage IV lung cancer with metastases who presents with a complaint of severe pain in his right side where he had a liver biopsy done  The patient's liver biopsy was done almost 3 weeks ago  The patient has seen his [facility] doctor and went to an emergency room since then for the same complaint  Patient denies any aggravating or alleviating factors  The pain is sharp stabbing severe as left side it is nonradiating  Patient is on home medications today he states he may have got out of his sequences taking a Valium for anxiety instead of his oxycodone  Patient denies any fevers or chills, no nausea vomiting or diarrhea  Patient does state that when he is on oxygen he feels better  States his palliative care doctor is working on getting him oxygen at home  Patient states the pain was so severe that he come to the emergency room for evaluation  Patient's primary care doctor recently increased his fentanyl and his breakthrough pain medication            Prior to Admission Medications   Prescriptions Last Dose Informant Patient Reported? Taking?    OXYCODONE HCL PO 11/23/2017 at 0400  Yes Yes   Sig: Take 15 mg by mouth every 4 (four) hours as needed   OXYCODONE HCL PO   Yes Yes   Sig: Take 10 mg by mouth every 4 (four) hours as needed   acetaminophen (TYLENOL) 500 mg tablet   Yes Yes   Sig: Take 1,000 mg by mouth every 8 (eight) hours   dexamethasone (DECADRON) 4 mg tablet   No Yes   Sig: Take 1 tablet by mouth daily with breakfast for 30 days   diazepam (VALIUM) 10 mg tablet   Yes Yes   Sig: Take 5 mg by mouth every evening   diazepam (VALIUM) 5 mg tablet   No Yes   Sig: Take 1 tablet by mouth every 8 (eight) hours   Patient taking differently: Take 5 mg by mouth every morning     fentaNYL (DURAGESIC) 75 mcg/hr 11/22/2017 at Unknown time  No Yes   Sig: Place 1 patch on the skin every third day Max Daily Amount: 1 patch   furosemide (LASIX) 40 mg tablet   Yes Yes   Sig: Take 40 mg by mouth daily as needed   gabapentin (NEURONTIN) 400 mg capsule   No Yes   Sig: Take 1 capsule by mouth daily at bedtime   lactulose 20 g/30 mL   Yes Yes   Sig: Take 30 g by mouth daily   mirtazapine (REMERON) 15 mg tablet   Yes Yes   Sig: Take 15 mg by mouth daily at bedtime   nicotine (NICODERM CQ) 21 mg/24 hr TD 24 hr patch   Yes Yes   Sig: Place 1 patch on the skin every 24 hours   pantoprazole (PROTONIX) 40 mg tablet   Yes Yes   Sig: Take 40 mg by mouth daily      Facility-Administered Medications: None       Past Medical History:   Diagnosis Date    Anxiety     GERD (gastroesophageal reflux disease)     Lung cancer (HCC)     B/L Lung CA stage 4       Past Surgical History:   Procedure Laterality Date    PORTACATH PLACEMENT         History reviewed  No pertinent family history  I have reviewed and agree with the history as documented  Social History   Substance Use Topics    Smoking status: Former Smoker     Quit date: 12/1/2015    Smokeless tobacco: Never Used    Alcohol use No        Review of Systems   Constitutional: Positive for fatigue  Negative for appetite change and fever  HENT: Negative for facial swelling and trouble swallowing  Eyes: Negative for photophobia and pain  Respiratory: Negative for chest tightness and shortness of breath  Cardiovascular: Positive for leg swelling  Negative for chest pain  Gastrointestinal: Positive for abdominal pain, constipation and nausea  Genitourinary: Negative for dysuria, flank pain and testicular pain  Musculoskeletal: Positive for back pain  Negative for neck pain and neck stiffness  Skin: Negative  Neurological: Negative for weakness, numbness and headaches  Hematological: Negative  Psychiatric/Behavioral: Negative  Physical Exam  ED Triage Vitals [11/23/17 0741]   Temperature Pulse Respirations Blood Pressure SpO2   97 7 °F (36 5 °C) (!) 110 20 129/71 92 %      Temp Source Heart Rate Source Patient Position - Orthostatic VS BP Location FiO2 (%)   Tympanic Monitor Lying Left arm --      Pain Score       Worst Possible Pain           Orthostatic Vital Signs  Vitals:    11/23/17 0741 11/23/17 1016   BP: 129/71 122/82   Pulse: (!) 110 103   Patient Position - Orthostatic VS: Lying Sitting       Physical Exam   Constitutional: He is oriented to person, place, and time  He appears well-developed  He appears distressed  HENT:   Head: Normocephalic and atraumatic  Eyes: EOM are normal  Pupils are equal, round, and reactive to light  Neck: Normal range of motion  Neck supple  Cardiovascular: Normal rate and regular rhythm  Pulmonary/Chest: Effort normal  No respiratory distress  He has decreased breath sounds in the right lower field and the left lower field  Abdominal: Soft  He exhibits no mass  There is tenderness  Musculoskeletal: He exhibits edema  Neurological: He is alert and oriented to person, place, and time  Skin: Skin is warm and dry  Psychiatric: He has a normal mood and affect  Nursing note and vitals reviewed  ED Medications  Medications   HYDROmorphone (DILAUDID) 2 mg/mL injection 2 mg (2 mg Intramuscular Given 11/23/17 0841)       Diagnostic Studies  Results Reviewed     None                 No orders to display              Procedures  Procedures       Phone Contacts  ED Phone Contact    ED Course  ED Course                                MDM  Number of Diagnoses or Management Options  Chronic pain:   Constipation:   Diagnosis management comments: Patient got pain relief in the emergency room we talked about his need for follow-up with [facility] and pain management with his condition   Patient was also referred suppositories for his constipation issues being on narcotic pain medications  Patient was discharged to get back on his pain medication regimen and follow up with [facility] doctor  Patient's wife state understanding and agreement with the assessment plan  CritCare Time    Disposition  Final diagnoses:   Chronic pain   Constipation     Time reflects when diagnosis was documented in both MDM as applicable and the Disposition within this note     Time User Action Codes Description Comment    11/23/2017 10:19 AM Nancy Golds Add [G89 29] Chronic pain     11/23/2017 10:19 AM Nancy Golds Add [K59 00] Constipation       ED Disposition     ED Disposition Condition Comment    Discharge  Holly Cj discharge to home/self care      Condition at discharge: Stable        Follow-up Information     Follow up With Specialties Details Why Contact Info Additional [address] Emergency Department Emergency Medicine  If symptoms worsen [address] [facility], [place], [place], [address]        Discharge Medication List as of 11/23/2017 10:24 AM      START taking these medications    Details   glycerin, adult, (glycerin adult) 2 g rectal suppository Insert 1 suppository into the rectum 2 (two) times a day, Starting Thu 11/23/2017, Print         CONTINUE these medications which have NOT CHANGED    Details   acetaminophen (TYLENOL) 500 mg tablet Take 1,000 mg by mouth every 8 (eight) hours, Historical Med      dexamethasone (DECADRON) 4 mg tablet Take 1 tablet by mouth daily with breakfast for 30 days, Starting Mon 11/6/2017, Until Wed 12/6/2017, Print      !! diazepam (VALIUM) 10 mg tablet Take 5 mg by mouth every evening, Historical Med      !! diazepam (VALIUM) 5 mg tablet Take 1 tablet by mouth every 8 (eight) hours, Starting Fri 11/3/2017, Print      fentaNYL (DURAGESIC) 75 mcg/hr Place 1 patch on the skin every third day Max Daily Amount: 1 patch, Starting Mon 11/6/2017, Print      furosemide (LASIX) 40 mg tablet Take 40 mg by mouth daily as needed, Historical Med      gabapentin (NEURONTIN) 400 mg capsule Take 1 capsule by mouth daily at bedtime, Starting Fri 11/3/2017, Print      lactulose 20 g/30 mL Take 30 g by mouth daily, Historical Med      mirtazapine (REMERON) 15 mg tablet Take 15 mg by mouth daily at bedtime, Historical Med      nicotine (NICODERM CQ) 21 mg/24 hr TD 24 hr patch Place 1 patch on the skin every 24 hours, Historical Med      !! OXYCODONE HCL PO Take 15 mg by mouth every 4 (four) hours as needed, Historical Med      !! OXYCODONE HCL PO Take 10 mg by mouth every 4 (four) hours as needed, Historical Med      pantoprazole (PROTONIX) 40 mg tablet Take 40 mg by mouth daily, Historical Med       !! - Potential duplicate medications found  Please discuss with provider  No discharge procedures on file      ED Provider  Electronically Signed by           Slim Choi MD  11/23/17 2463

## 2017-11-26 ENCOUNTER — APPOINTMENT (EMERGENCY)
Dept: RADIOLOGY | Facility: HOSPITAL | Age: 37
End: 2017-11-26
Payer: COMMERCIAL

## 2017-11-26 ENCOUNTER — HOSPITAL ENCOUNTER (EMERGENCY)
Facility: HOSPITAL | Age: 37
Discharge: HOME | End: 2017-11-26
Payer: COMMERCIAL

## 2017-11-26 ENCOUNTER — GENERIC CONVERSION - ENCOUNTER (OUTPATIENT)
Dept: OTHER | Facility: OTHER | Age: 37
End: 2017-11-26

## 2017-11-26 VITALS
TEMPERATURE: 98.4 F | SYSTOLIC BLOOD PRESSURE: 131 MMHG | HEART RATE: 104 BPM | DIASTOLIC BLOOD PRESSURE: 89 MMHG | WEIGHT: 179 LBS | RESPIRATION RATE: 20 BRPM | OXYGEN SATURATION: 95 % | BODY MASS INDEX: 22.37 KG/M2

## 2017-11-26 DIAGNOSIS — G89.29 CHRONIC CHEST PAIN: Primary | ICD-10-CM

## 2017-11-26 DIAGNOSIS — R07.9 CHRONIC CHEST PAIN: Primary | ICD-10-CM

## 2017-11-26 LAB
ALBUMIN SERPL BCP-MCNC: 2.4 G/DL (ref 3.5–5)
ALP SERPL-CCNC: 554 U/L (ref 46–116)
ALT SERPL W P-5'-P-CCNC: 114 U/L (ref 12–78)
ANION GAP SERPL CALCULATED.3IONS-SCNC: 9 MMOL/L (ref 4–13)
AST SERPL W P-5'-P-CCNC: 135 U/L (ref 5–45)
BILIRUB SERPL-MCNC: 0.7 MG/DL (ref 0.2–1)
BUN SERPL-MCNC: 20 MG/DL (ref 5–25)
CALCIUM SERPL-MCNC: 9.4 MG/DL (ref 8.3–10.1)
CHLORIDE SERPL-SCNC: 99 MMOL/L (ref 100–108)
CO2 SERPL-SCNC: 32 MMOL/L (ref 21–32)
CREAT SERPL-MCNC: 0.85 MG/DL (ref 0.6–1.3)
ERYTHROCYTE [DISTWIDTH] IN BLOOD BY AUTOMATED COUNT: 20.9 % (ref 11.6–15.1)
GFR SERPL CREATININE-BSD FRML MDRD: 127 ML/MIN/1.73SQ M
GLUCOSE SERPL-MCNC: 79 MG/DL (ref 65–140)
HCT VFR BLD AUTO: 35.1 % (ref 42–52)
HGB BLD-MCNC: 10.8 G/DL (ref 14–18)
HYPERCHROMIA BLD QL SMEAR: PRESENT
LYMPHOCYTES # BLD AUTO: 18 %
LYMPHOCYTES # BLD AUTO: 2.14 THOUSAND/UL (ref 0.6–4.47)
MCH RBC QN AUTO: 19.1 PG (ref 27–31)
MCHC RBC AUTO-ENTMCNC: 30.6 G/DL (ref 31.4–37.4)
MCV RBC AUTO: 63 FL (ref 82–98)
MONOCYTES # BLD AUTO: 0.12 THOUSAND/UL (ref 0–1.22)
MONOCYTES NFR BLD AUTO: 1 % (ref 4–12)
NEUTS BAND NFR BLD MANUAL: 0 % (ref 0–8)
NEUTS SEG # BLD: 9.64 THOUSAND/UL (ref 1.81–6.82)
NEUTS SEG NFR BLD AUTO: 81 %
PLATELET # BLD AUTO: 466 THOUSANDS/UL (ref 130–400)
PLATELET BLD QL SMEAR: ABNORMAL
PMV BLD AUTO: 7.4 FL (ref 8.9–12.7)
POTASSIUM SERPL-SCNC: 4.3 MMOL/L (ref 3.5–5.3)
PROT SERPL-MCNC: 7.2 G/DL (ref 6.4–8.2)
RBC # BLD AUTO: 5.63 MILLION/UL (ref 4.7–6.1)
SODIUM SERPL-SCNC: 140 MMOL/L (ref 136–145)
TARGETS BLD QL SMEAR: PRESENT
TOTAL CELLS COUNTED SPEC: 100
WBC # BLD AUTO: 11.9 THOUSAND/UL (ref 4.8–10.8)

## 2017-11-26 PROCEDURE — 93005 ELECTROCARDIOGRAM TRACING: CPT | Performed by: PHYSICIAN ASSISTANT

## 2017-11-26 PROCEDURE — 93005 ELECTROCARDIOGRAM TRACING: CPT

## 2017-11-26 PROCEDURE — 85007 BL SMEAR W/DIFF WBC COUNT: CPT | Performed by: PHYSICIAN ASSISTANT

## 2017-11-26 PROCEDURE — 80053 COMPREHEN METABOLIC PANEL: CPT | Performed by: PHYSICIAN ASSISTANT

## 2017-11-26 PROCEDURE — 36415 COLL VENOUS BLD VENIPUNCTURE: CPT | Performed by: PHYSICIAN ASSISTANT

## 2017-11-26 PROCEDURE — 85027 COMPLETE CBC AUTOMATED: CPT | Performed by: PHYSICIAN ASSISTANT

## 2017-11-26 PROCEDURE — 96372 THER/PROPH/DIAG INJ SC/IM: CPT

## 2017-11-26 PROCEDURE — 96361 HYDRATE IV INFUSION ADD-ON: CPT

## 2017-11-26 PROCEDURE — 99285 EMERGENCY DEPT VISIT HI MDM: CPT

## 2017-11-26 PROCEDURE — 96374 THER/PROPH/DIAG INJ IV PUSH: CPT

## 2017-11-26 PROCEDURE — 71275 CT ANGIOGRAPHY CHEST: CPT

## 2017-11-26 RX ADMIN — SODIUM CHLORIDE 1000 ML: 0.9 INJECTION, SOLUTION INTRAVENOUS at 11:12

## 2017-11-26 RX ADMIN — HYDROMORPHONE HYDROCHLORIDE 1 MG: 1 INJECTION, SOLUTION INTRAMUSCULAR; INTRAVENOUS; SUBCUTANEOUS at 11:12

## 2017-11-26 RX ADMIN — IOHEXOL 85 ML: 350 INJECTION, SOLUTION INTRAVENOUS at 12:16

## 2017-11-26 RX ADMIN — HYDROMORPHONE HYDROCHLORIDE 1 MG: 1 INJECTION, SOLUTION INTRAMUSCULAR; INTRAVENOUS; SUBCUTANEOUS at 15:02

## 2017-11-26 NOTE — ED NOTES
Patient returns from CT , lying on right side, patient does not want to do med list at this time, does not want BP cuff on at this time   Sleeping arouses easily     Yaya Nielson RN  11/26/17 9992

## 2017-11-26 NOTE — ED NOTES
Patient's sister here and has discussion with MINH Sethi [place]   Patient requests pain med prior to discharge and medicated with Dilaudid  Patient is very insistent on going home       Hannah Crabtree RN  11/26/17 3358

## 2017-11-26 NOTE — DISCHARGE INSTRUCTIONS

## 2017-11-26 NOTE — ED NOTES
Patient walking in [place] initially refusing wheelchair  Requests a turkey sandwich box meal to take home  Had been drinking coffee  Patient uses bathroom, to car via wheelchair  Appointment in am with oncology  Understanding instructions to patient and sister   Per sister patient was on same meds as previous visit ( patient did not want to review meds since triage)     Hannah Crabtree RN  11/26/17 5193

## 2017-11-26 NOTE — ED NOTES
Patient had urinal at bedside table  Unhooked self from monitor and crawled off foot of stretcher so could void in urinal across the room   Patient is not confused   CT tech here and patient to [address] RN  11/26/17 1056

## 2017-11-26 NOTE — ED PROVIDER NOTES
History  Chief Complaint   Patient presents with    Shortness of Breath     Pt reports he was here the other day for SOB, reports he continues to be SOB and generalized pain but primarily in his back  Pt reports being dx stage 4 lung CA   Chest Pain     43 y/o male, h/o stage 4 lung cancer, presenting with chest pain and shortness of breath that has worsened over the past 2 days  States that he also have generalized pain  Being seen by Palliative management  Pain is 7/10 non-radiating  Relays he was recently placed on Valium and after taking this medication he becomes short of breath  Also notes some lower back pain from liver biopsy performed 3 weeks ago  Some nausea  Last meal was yesterday and tolerated without difficulty  Sees Dr Gem Roth for oncology  + fatigue  Recent increased in fentanyl  Occasional abdominal pain with constipation at times  Denies fevers, headache, change in vision, diarrhea,  wheezing  Prior to Admission Medications   Prescriptions Last Dose Informant Patient Reported? Taking?    OXYCODONE HCL PO   Yes No   Sig: Take 15 mg by mouth every 4 (four) hours as needed   OXYCODONE HCL PO   Yes No   Sig: Take 10 mg by mouth every 4 (four) hours as needed   acetaminophen (TYLENOL) 500 mg tablet   Yes No   Sig: Take 1,000 mg by mouth every 8 (eight) hours   dexamethasone (DECADRON) 4 mg tablet   No No   Sig: Take 1 tablet by mouth daily with breakfast for 30 days   diazepam (VALIUM) 10 mg tablet   Yes No   Sig: Take 5 mg by mouth every evening   diazepam (VALIUM) 5 mg tablet   No No   Sig: Take 1 tablet by mouth every 8 (eight) hours   Patient taking differently: Take 5 mg by mouth every morning     fentaNYL (DURAGESIC) 75 mcg/hr   No No   Sig: Place 1 patch on the skin every third day Max Daily Amount: 1 patch   furosemide (LASIX) 40 mg tablet   Yes No   Sig: Take 40 mg by mouth daily as needed   gabapentin (NEURONTIN) 400 mg capsule   No No   Sig: Take 1 capsule by mouth daily at bedtime   glycerin, adult, (glycerin adult) 2 g rectal suppository   No No   Sig: Insert 1 suppository into the rectum 2 (two) times a day   lactulose 20 g/30 mL   Yes No   Sig: Take 30 g by mouth daily   mirtazapine (REMERON) 15 mg tablet   Yes No   Sig: Take 15 mg by mouth daily at bedtime   nicotine (NICODERM CQ) 21 mg/24 hr TD 24 hr patch   Yes No   Sig: Place 1 patch on the skin every 24 hours   pantoprazole (PROTONIX) 40 mg tablet   Yes No   Sig: Take 40 mg by mouth daily      Facility-Administered Medications: None       Past Medical History:   Diagnosis Date    Anxiety     GERD (gastroesophageal reflux disease)     Lung cancer (HCC)     B/L Lung CA stage 4       Past Surgical History:   Procedure Laterality Date    PORTACATH PLACEMENT         History reviewed  No pertinent family history  I have reviewed and agree with the history as documented  Social History   Substance Use Topics    Smoking status: Former Smoker     Quit date: 12/1/2015    Smokeless tobacco: Never Used    Alcohol use No        Review of Systems   Constitutional: Positive for fatigue  Negative for activity change, appetite change, chills, diaphoresis, fever and unexpected weight change  HENT: Negative  Eyes: Negative  Respiratory: Positive for shortness of breath  Negative for apnea, cough, choking, chest tightness, wheezing and stridor  Cardiovascular: Positive for chest pain  Negative for palpitations and leg swelling  Gastrointestinal: Positive for abdominal pain and nausea  Negative for abdominal distention, anal bleeding, blood in stool, constipation, diarrhea, rectal pain and vomiting  Genitourinary: Negative  Musculoskeletal: Negative  Skin: Negative  Neurological: Negative  All other systems reviewed and are negative        Physical Exam  ED Triage Vitals   Temperature Pulse Respirations Blood Pressure SpO2   11/26/17 1330 11/26/17 1033 11/26/17 1033 11/26/17 1033 11/26/17 1033   98 4 °F (36 9 °C) (!) 119 19 110/73 94 %      Temp Source Heart Rate Source Patient Position - Orthostatic VS BP Location FiO2 (%)   11/26/17 1330 11/26/17 1033 11/26/17 1033 11/26/17 1033 --   Oral Monitor Lying Left arm       Pain Score       11/26/17 1029       Worst Possible Pain           Orthostatic Vital Signs  Vitals:    11/26/17 1415 11/26/17 1430 11/26/17 1445 11/26/17 1456   BP: 108/76 134/92 126/81 131/89   Pulse: 96 (!) 112 104 104   Patient Position - Orthostatic VS:    Sitting       Physical Exam   Constitutional: He appears well-developed and well-nourished  He appears distressed  Patient appears very uncomfortable, in a crouched position  HENT:   Head: Normocephalic and atraumatic  Dry mucous membranes    Eyes: Conjunctivae and EOM are normal  Pupils are equal, round, and reactive to light  Neck: Normal range of motion  Neck supple  Cardiovascular: Regular rhythm, normal heart sounds and intact distal pulses  Exam reveals no gallop and no friction rub  No murmur heard  Pulmonary/Chest: Effort normal  No respiratory distress  He has no wheezes  He has no rales  He exhibits no tenderness  spo2 is 99% indicating adequate oxygenation  Bilateral lower lung fields with decreased airflow  No wheezing  Abdominal: Soft  Bowel sounds are normal  He exhibits no distension and no mass  There is no tenderness  There is no rebound and no guarding  No hernia  Musculoskeletal:        Arms:  Bilateral lower leg swelling  Skin: Skin is warm and dry  Capillary refill takes less than 2 seconds  Nursing note and vitals reviewed        ED Medications  Medications   sodium chloride 0 9 % bolus 1,000 mL (0 mL Intravenous Stopped 11/26/17 1330)   HYDROmorphone (DILAUDID) 1 mg/mL injection 1 mg (1 mg Intravenous Given 11/26/17 1112)   iohexol (OMNIPAQUE) 350 MG/ML injection (MULTI-DOSE) 85 mL (85 mL Intravenous Given 11/26/17 1216)   HYDROmorphone (DILAUDID) 1 mg/mL injection 1 mg (1 mg Intramuscular Given 11/26/17 1502)       Diagnostic Studies  Results Reviewed     Procedure Component Value Units Date/Time    CBC and differential [[id number]]  (Abnormal) Collected:  11/26/17 1112    Lab Status:  Final result Specimen:  Blood from Arm, Left Updated:  11/26/17 1151     WBC 11 90 (H) Thousand/uL      RBC 5 63 Million/uL      Hemoglobin 10 8 (L) g/dL      Hematocrit 35 1 (L) %      MCV 63 (L) fL      MCH 19 1 (L) pg      MCHC 30 6 (L) g/dL      RDW 20 9 (H) %      MPV 7 4 (L) fL      Platelets 108 (H) Thousands/uL     Comprehensive metabolic panel [[id number]]  (Abnormal) Collected:  11/26/17 1112    Lab Status:  Final result Specimen:  Blood from Arm, Left Updated:  11/26/17 1145     Sodium 140 mmol/L      Potassium 4 3 mmol/L      Chloride 99 (L) mmol/L      CO2 32 mmol/L      Anion Gap 9 mmol/L      BUN 20 mg/dL      Creatinine 0 85 mg/dL      Glucose 79 mg/dL      Calcium 9 4 mg/dL       (H) U/L       (H) U/L      Alkaline Phosphatase 554 (H) U/L      Total Protein 7 2 g/dL      Albumin 2 4 (L) g/dL      Total Bilirubin 0 70 mg/dL      eGFR 127 ml/min/1 73sq m     Narrative:         National Kidney Disease Education Program recommendations are as follows:  GFR calculation is accurate only with a steady state creatinine  Chronic Kidney disease less than 60 ml/min/1 73 sq  meters  Kidney failure less than 15 ml/min/1 73 sq  meters  CTA ED chest PE study   Final Result by Nena Malin MD (11/26 1240)      There is no evidence for pulmonary embolism  Please note this is the patient's 3rd PE study since 10/26/2017  There is no PE  Patient has a very large progressive pulmonary malignancy resulting in continued displacement and distortion of the    mediastinal structures, heart and pulmonary vasculature as detailed above  Continued gradual interval enlargement of the RIGHT pulmonary confluent mass  Stable partially calcified nodular lesions in the RIGHT upper lobe    Stable LEFT upper lobe nodule  Background of bullous emphysema  Progressive compressive atelectasis in    the RIGHT lower lobe  Patchy groundglass opacities in the LEFT lower lobe, mildly improved compared to prior  Extensive liver and upper abdominal metastatic disease without gross change  Workstation performed: [id number]                    Procedures  Procedures       Phone Contacts  ED Phone Contact    ED Course  ED Course                                MDM  Number of Diagnoses or Management Options  Chronic chest pain:   Diagnosis management comments: Concern raised for PE despite prior neg studies  Patient agrees to CT scan at this time as he has had worsening chest pain, SOB and tachycardic with cancer history  Initially appeared very uncomfortable  Patient has an appointment with his oncologist tomorrow morning  Patient overall feeling better now, no longer SOB after pain subsided and without chest pain  Resting comfortably, sleeping  Tolerating fluids and regained appetite  Strict return precautions  Otherwise is to f/u with oncology tomorrow morning for his appointment  Patient verbalizes understanding and agrees with the above assessment and plan  Amount and/or Complexity of Data Reviewed  Clinical lab tests: reviewed and ordered  Tests in the radiology section of CPT®: ordered and reviewed  Review and summarize past medical records: yes  Independent visualization of images, tracings, or specimens: yes      CritCare Time    Disposition  Final diagnoses:   Chronic chest pain     Time reflects when diagnosis was documented in both MDM as applicable and the Disposition within this note     Time User Action Codes Description Comment    11/26/2017 12:59 PM Janet Gutiérrez Add [R07 9  G89 29] Chronic chest pain       ED Disposition     ED Disposition Condition Comment    Discharge  Evens Gomez discharge to home/self care      Condition at discharge: Good        Follow-up Information     Follow up With Specialties Details Why Contact Info Additional [phone] Emergency Department Emergency Medicine Go to If symptoms worsen such as fevers, difficulty breathing  Otherwise please keep your appointment with your oncologist for tomorrow morning  [address] [place] [address]  [phone] [facility], [address], [address]        Patient's Medications   Discharge Prescriptions    No medications on file     No discharge procedures on file      ED Provider  Electronically Signed by           Dion Matta PA-C  11/26/17 2011 [facility]  11/26/17 4207

## 2017-11-27 LAB
ATRIAL RATE: 120 BPM
ATRIAL RATE: 89 BPM
P AXIS: 77 DEGREES
P AXIS: 85 DEGREES
PR INTERVAL: 124 MS
PR INTERVAL: 174 MS
QRS AXIS: 73 DEGREES
QRS AXIS: 79 DEGREES
QRSD INTERVAL: 100 MS
QRSD INTERVAL: 74 MS
QT INTERVAL: 314 MS
QT INTERVAL: 346 MS
QTC INTERVAL: 420 MS
QTC INTERVAL: 443 MS
SCAN RESULT: NORMAL
T WAVE AXIS: 42 DEGREES
T WAVE AXIS: 76 DEGREES
VENTRICULAR RATE: 120 BPM
VENTRICULAR RATE: 89 BPM

## 2017-11-28 ENCOUNTER — APPOINTMENT (EMERGENCY)
Dept: RADIOLOGY | Facility: HOSPITAL | Age: 37
DRG: 136 | End: 2017-11-28
Payer: COMMERCIAL

## 2017-11-28 ENCOUNTER — HOSPITAL ENCOUNTER (INPATIENT)
Facility: HOSPITAL | Age: 37
LOS: 6 days | Discharge: HOME | DRG: 136 | End: 2017-12-04
Attending: EMERGENCY MEDICINE | Admitting: FAMILY MEDICINE
Payer: COMMERCIAL

## 2017-11-28 DIAGNOSIS — K59.00 CONSTIPATION: ICD-10-CM

## 2017-11-28 DIAGNOSIS — R60.0 LOWER EXTREMITY EDEMA: ICD-10-CM

## 2017-11-28 DIAGNOSIS — C34.90 LUNG CANCER (HCC): ICD-10-CM

## 2017-11-28 DIAGNOSIS — R52 INTRACTABLE PAIN: Primary | ICD-10-CM

## 2017-11-28 DIAGNOSIS — C34.90 METASTATIC LUNG CANCER (METASTASIS FROM LUNG TO OTHER SITE) (HCC): ICD-10-CM

## 2017-11-28 DIAGNOSIS — C34.91 ADENOCARCINOMA OF RIGHT LUNG, STAGE 4 (HCC): Chronic | ICD-10-CM

## 2017-11-28 PROBLEM — R06.02 SHORTNESS OF BREATH: Status: ACTIVE | Noted: 2017-11-28

## 2017-11-28 PROBLEM — F11.20 OPIATE DEPENDENCE, CONTINUOUS (HCC): Status: ACTIVE | Noted: 2017-11-28

## 2017-11-28 PROBLEM — R60.9 EDEMA: Status: ACTIVE | Noted: 2017-11-28

## 2017-11-28 LAB
ALBUMIN SERPL BCP-MCNC: 2.3 G/DL (ref 3.5–5)
ALP SERPL-CCNC: 695 U/L (ref 46–116)
ALT SERPL W P-5'-P-CCNC: 111 U/L (ref 12–78)
ANION GAP SERPL CALCULATED.3IONS-SCNC: 14 MMOL/L (ref 4–13)
APTT PPP: 63 SECONDS (ref 23–35)
AST SERPL W P-5'-P-CCNC: 163 U/L (ref 5–45)
BASOPHILS # BLD AUTO: 0.01 THOUSANDS/ΜL (ref 0–0.1)
BASOPHILS NFR BLD AUTO: 0 % (ref 0–1)
BILIRUB SERPL-MCNC: 1.5 MG/DL (ref 0.2–1)
BUN SERPL-MCNC: 30 MG/DL (ref 5–25)
CALCIUM SERPL-MCNC: 9.7 MG/DL (ref 8.3–10.1)
CHLORIDE SERPL-SCNC: 98 MMOL/L (ref 100–108)
CLARITY, POC: CLEAR
CO2 SERPL-SCNC: 26 MMOL/L (ref 21–32)
COLOR, POC: NORMAL
CREAT SERPL-MCNC: 1.08 MG/DL (ref 0.6–1.3)
EOSINOPHIL # BLD AUTO: 0 THOUSAND/ΜL (ref 0–0.61)
EOSINOPHIL NFR BLD AUTO: 0 % (ref 0–6)
ERYTHROCYTE [DISTWIDTH] IN BLOOD BY AUTOMATED COUNT: 22.7 % (ref 11.6–15.1)
EXT BILIRUBIN, UA: NORMAL
EXT BLOOD URINE: NORMAL
EXT GLUCOSE, UA: NEGATIVE
EXT KETONES: NEGATIVE
EXT NITRITE, UA: NEGATIVE
EXT PH, UA: 6
EXT PROTEIN, UA: 2000
EXT SPECIFIC GRAVITY, UA: 1.02
EXT UROBILINOGEN: 0.2
GFR SERPL CREATININE-BSD FRML MDRD: 99 ML/MIN/1.73SQ M
GLUCOSE SERPL-MCNC: 84 MG/DL (ref 65–140)
HCT VFR BLD AUTO: 35.2 % (ref 36.5–49.3)
HGB BLD-MCNC: 10.8 G/DL (ref 12–17)
INR PPP: 1.27 (ref 0.86–1.16)
LYMPHOCYTES # BLD AUTO: 1.21 THOUSANDS/ΜL (ref 0.6–4.47)
LYMPHOCYTES NFR BLD AUTO: 10 % (ref 14–44)
MCH RBC QN AUTO: 18.6 PG (ref 26.8–34.3)
MCHC RBC AUTO-ENTMCNC: 30.7 G/DL (ref 31.4–37.4)
MCV RBC AUTO: 61 FL (ref 82–98)
MONOCYTES # BLD AUTO: 0.56 THOUSAND/ΜL (ref 0.17–1.22)
MONOCYTES NFR BLD AUTO: 4 % (ref 4–12)
NEUTROPHILS # BLD AUTO: 10.91 THOUSANDS/ΜL (ref 1.85–7.62)
NEUTS SEG NFR BLD AUTO: 86 % (ref 43–75)
NRBC BLD AUTO-RTO: 0 /100 WBCS
NT-PROBNP SERPL-MCNC: 633 PG/ML
PLATELET # BLD AUTO: 410 THOUSANDS/UL (ref 149–390)
PMV BLD AUTO: 8.8 FL (ref 8.9–12.7)
POTASSIUM SERPL-SCNC: 4.5 MMOL/L (ref 3.5–5.3)
PROT SERPL-MCNC: 7.2 G/DL (ref 6.4–8.2)
PROTHROMBIN TIME: 16.2 SECONDS (ref 12.1–14.4)
RBC # BLD AUTO: 5.8 MILLION/UL (ref 3.88–5.62)
SODIUM SERPL-SCNC: 138 MMOL/L (ref 136–145)
SPECIMEN SOURCE: NORMAL
TROPONIN I BLD-MCNC: 0 NG/ML (ref 0–0.08)
WBC # BLD AUTO: 12.72 THOUSAND/UL (ref 4.31–10.16)
WBC # BLD EST: NEGATIVE 10*3/UL

## 2017-11-28 PROCEDURE — 83880 ASSAY OF NATRIURETIC PEPTIDE: CPT | Performed by: EMERGENCY MEDICINE

## 2017-11-28 PROCEDURE — 81002 URINALYSIS NONAUTO W/O SCOPE: CPT | Performed by: EMERGENCY MEDICINE

## 2017-11-28 PROCEDURE — 71010 HB CHEST X-RAY 1 VIEW FRONTAL (PORTABLE): CPT

## 2017-11-28 PROCEDURE — 85730 THROMBOPLASTIN TIME PARTIAL: CPT | Performed by: EMERGENCY MEDICINE

## 2017-11-28 PROCEDURE — 84484 ASSAY OF TROPONIN QUANT: CPT

## 2017-11-28 PROCEDURE — 96361 HYDRATE IV INFUSION ADD-ON: CPT

## 2017-11-28 PROCEDURE — 85025 COMPLETE CBC W/AUTO DIFF WBC: CPT | Performed by: EMERGENCY MEDICINE

## 2017-11-28 PROCEDURE — 96374 THER/PROPH/DIAG INJ IV PUSH: CPT

## 2017-11-28 PROCEDURE — 36415 COLL VENOUS BLD VENIPUNCTURE: CPT | Performed by: EMERGENCY MEDICINE

## 2017-11-28 PROCEDURE — 93005 ELECTROCARDIOGRAM TRACING: CPT | Performed by: EMERGENCY MEDICINE

## 2017-11-28 PROCEDURE — 80053 COMPREHEN METABOLIC PANEL: CPT | Performed by: EMERGENCY MEDICINE

## 2017-11-28 PROCEDURE — 85610 PROTHROMBIN TIME: CPT | Performed by: EMERGENCY MEDICINE

## 2017-11-28 RX ORDER — ALPRAZOLAM 0.5 MG/1
0.5 TABLET ORAL ONCE
Status: COMPLETED | OUTPATIENT
Start: 2017-11-28 | End: 2017-11-28

## 2017-11-28 RX ORDER — HYDROMORPHONE HCL 110MG/55ML
2 PATIENT CONTROLLED ANALGESIA SYRINGE INTRAVENOUS ONCE
Status: COMPLETED | OUTPATIENT
Start: 2017-11-28 | End: 2017-11-28

## 2017-11-28 RX ORDER — MIRTAZAPINE 15 MG/1
15 TABLET, FILM COATED ORAL
Status: DISCONTINUED | OUTPATIENT
Start: 2017-11-28 | End: 2017-12-01

## 2017-11-28 RX ORDER — ACETAMINOPHEN 325 MG/1
1000 TABLET ORAL EVERY 8 HOURS
Status: DISCONTINUED | OUTPATIENT
Start: 2017-11-28 | End: 2017-12-04 | Stop reason: HOSPADM

## 2017-11-28 RX ORDER — FUROSEMIDE 10 MG/ML
40 INJECTION INTRAMUSCULAR; INTRAVENOUS
Status: DISCONTINUED | OUTPATIENT
Start: 2017-11-29 | End: 2017-11-30

## 2017-11-28 RX ORDER — ALPRAZOLAM 0.5 MG/1
0.5 TABLET ORAL 2 TIMES DAILY
Status: DISCONTINUED | OUTPATIENT
Start: 2017-11-28 | End: 2017-11-29

## 2017-11-28 RX ORDER — HEPARIN SODIUM 5000 [USP'U]/ML
5000 INJECTION, SOLUTION INTRAVENOUS; SUBCUTANEOUS EVERY 8 HOURS SCHEDULED
Status: DISCONTINUED | OUTPATIENT
Start: 2017-11-28 | End: 2017-12-04 | Stop reason: HOSPADM

## 2017-11-28 RX ORDER — FENTANYL 75 UG/H
1 PATCH TRANSDERMAL
Status: DISCONTINUED | OUTPATIENT
Start: 2017-11-28 | End: 2017-12-01

## 2017-11-28 RX ORDER — DIAZEPAM 5 MG/1
10 TABLET ORAL
Status: DISCONTINUED | OUTPATIENT
Start: 2017-11-28 | End: 2017-11-28

## 2017-11-28 RX ORDER — GABAPENTIN 400 MG/1
400 CAPSULE ORAL
Status: DISCONTINUED | OUTPATIENT
Start: 2017-11-28 | End: 2017-12-01

## 2017-11-28 RX ORDER — FUROSEMIDE 10 MG/ML
40 INJECTION INTRAMUSCULAR; INTRAVENOUS ONCE
Status: COMPLETED | OUTPATIENT
Start: 2017-11-28 | End: 2017-11-28

## 2017-11-28 RX ORDER — SODIUM PHOSPHATE, DIBASIC AND SODIUM PHOSPHATE, MONOBASIC 7; 19 G/133ML; G/133ML
1 ENEMA RECTAL ONCE
Status: COMPLETED | OUTPATIENT
Start: 2017-11-28 | End: 2017-11-28

## 2017-11-28 RX ORDER — DEXAMETHASONE 4 MG/1
4 TABLET ORAL
Status: DISCONTINUED | OUTPATIENT
Start: 2017-11-29 | End: 2017-11-29

## 2017-11-28 RX ORDER — OXYCODONE HYDROCHLORIDE 10 MG/1
20 TABLET ORAL EVERY 4 HOURS PRN
Status: DISCONTINUED | OUTPATIENT
Start: 2017-11-28 | End: 2017-12-04 | Stop reason: HOSPADM

## 2017-11-28 RX ORDER — SODIUM PHOSPHATE, DIBASIC AND SODIUM PHOSPHATE, MONOBASIC 7; 19 G/133ML; G/133ML
1 ENEMA RECTAL DAILY PRN
Status: DISCONTINUED | OUTPATIENT
Start: 2017-11-28 | End: 2017-12-04 | Stop reason: HOSPADM

## 2017-11-28 RX ORDER — AMOXICILLIN 250 MG
1 CAPSULE ORAL 2 TIMES DAILY
Status: DISCONTINUED | OUTPATIENT
Start: 2017-11-28 | End: 2017-12-01

## 2017-11-28 RX ORDER — OXYCODONE HYDROCHLORIDE 10 MG/1
10 TABLET ORAL EVERY 4 HOURS PRN
Status: DISCONTINUED | OUTPATIENT
Start: 2017-11-28 | End: 2017-12-04 | Stop reason: HOSPADM

## 2017-11-28 RX ORDER — PANTOPRAZOLE SODIUM 40 MG/1
40 TABLET, DELAYED RELEASE ORAL DAILY
Status: DISCONTINUED | OUTPATIENT
Start: 2017-11-29 | End: 2017-12-04 | Stop reason: HOSPADM

## 2017-11-28 RX ORDER — NICOTINE 21 MG/24HR
1 PATCH, TRANSDERMAL 24 HOURS TRANSDERMAL EVERY 24 HOURS
Status: DISCONTINUED | OUTPATIENT
Start: 2017-11-28 | End: 2017-12-04 | Stop reason: HOSPADM

## 2017-11-28 RX ORDER — DIAZEPAM 5 MG/1
5 TABLET ORAL EVERY MORNING
Status: DISCONTINUED | OUTPATIENT
Start: 2017-11-29 | End: 2017-11-28

## 2017-11-28 RX ORDER — LACTULOSE 20 G/30ML
30 SOLUTION ORAL DAILY
Status: DISCONTINUED | OUTPATIENT
Start: 2017-11-29 | End: 2017-12-04 | Stop reason: HOSPADM

## 2017-11-28 RX ADMIN — HYDROMORPHONE HYDROCHLORIDE 2 MG: 2 INJECTION, SOLUTION INTRAMUSCULAR; INTRAVENOUS; SUBCUTANEOUS at 14:22

## 2017-11-28 RX ADMIN — Medication 1 TABLET: at 20:20

## 2017-11-28 RX ADMIN — ALPRAZOLAM 0.5 MG: 0.5 TABLET ORAL at 16:17

## 2017-11-28 RX ADMIN — OXYCODONE HYDROCHLORIDE 20 MG: 10 TABLET ORAL at 19:46

## 2017-11-28 RX ADMIN — ACETAMINOPHEN 975 MG: 325 TABLET ORAL at 19:47

## 2017-11-28 RX ADMIN — FUROSEMIDE 40 MG: 10 INJECTION, SOLUTION INTRAMUSCULAR; INTRAVENOUS at 15:13

## 2017-11-28 RX ADMIN — ALPRAZOLAM 0.5 MG: 0.5 TABLET ORAL at 22:28

## 2017-11-28 RX ADMIN — METHYLNALTREXONE BROMIDE 8 MG: 12 INJECTION, SOLUTION SUBCUTANEOUS at 20:21

## 2017-11-28 RX ADMIN — HYDROMORPHONE HYDROCHLORIDE 2 MG: 2 INJECTION, SOLUTION INTRAMUSCULAR; INTRAVENOUS; SUBCUTANEOUS at 16:04

## 2017-11-28 RX ADMIN — SODIUM PHOSPHATE, DIBASIC AND SODIUM PHOSPHATE, MONOBASIC 1 ENEMA: 7; 19 ENEMA RECTAL at 15:08

## 2017-11-28 RX ADMIN — HYDROMORPHONE HYDROCHLORIDE 2 MG: 2 INJECTION, SOLUTION INTRAMUSCULAR; INTRAVENOUS; SUBCUTANEOUS at 11:45

## 2017-11-28 RX ADMIN — HEPARIN 300 UNITS: 100 SYRINGE at 15:25

## 2017-11-28 RX ADMIN — MIRTAZAPINE 15 MG: 15 TABLET, FILM COATED ORAL at 22:48

## 2017-11-28 RX ADMIN — GABAPENTIN 400 MG: 300 CAPSULE ORAL at 22:28

## 2017-11-28 RX ADMIN — SODIUM CHLORIDE 1000 ML: 0.9 INJECTION, SOLUTION INTRAVENOUS at 11:55

## 2017-11-28 NOTE — ED NOTES
Very difficult getting testing done on pt as he is extremely preoccupied with moving his bowels & urinating         Cathy Stoddard RN  11/28/17 220 Filemon Foss RN  11/28/17 1123

## 2017-11-28 NOTE — ED NOTES
Pt preoccupied with moving his bowel & urinating, wanting to sit at edge of bed       Robert Sebastian RN  11/28/17 6496

## 2017-11-28 NOTE — ED NOTES
Upon arrival to ER, pt insistent to use bathroom, taken to bathroom via wc with assist   No BM nor void       Taryn Caballero RN  11/28/17 9984

## 2017-11-28 NOTE — ED NOTES
Pt's position of comfort is sitting at side of bed with feet on the floor, & head on bedside table, states he is comfortable sitting in this position, offered dilaudid as ordered, pt states he has no pain if he is sitting on side of bed, asks "do I have to take the pain med"  Will hold when till pt requests  Wife at bedside & aware we are waiting for bed assignment       Sharon Garcia, GÉNESIS  11/28/17 111 [facility], RN  11/28/17 3523

## 2017-11-28 NOTE — ED NOTES
One to one initiated  Pt is sitting on the commode attempting to move his bowels        Hanna Ledesma  11/28/17 8411

## 2017-11-28 NOTE — ED PROVIDER NOTES
History  Chief Complaint   Patient presents with    Pain     Pt presents to ER via EMS from home with c/o's uncontrolled pain  Pt reports severe pain in from "waist down" & excerbated with movement  Pt is stage IV lung ca  Chronically ill appearing adult presents in no distress  Patient has a history of metastatic lung cancer which was 1st diagnosed 4 years ago  He attempted chemotherapy at that time but was unable to tolerate it  He recently was scheduled to begin chemotherapy again yesterday but was unable to make the appointment due to his chest pain, shortness of breath and lower extremity edema  He was seen at [facility] ER yesterday and had a CT of the chest done for this  He persists with chest pain and shortness of breath which is chronic and complains of lower extremity edema worsening over the past 3 weeks despite taking furosemide  He also complains of constipation  No fevers, no nausea/vomiting  He has had decreased appetite  Patient's wife states that he was up all night uncomfortable from constantly trying to urinate and have a bowel movement all night  CT chest from 11/26/17: IMPRESSION:     There is no evidence for pulmonary embolism  Please note this is the patient's 3rd PE study since 10/26/2017  There is no PE  Patient has a very large progressive pulmonary malignancy resulting in continued displacement and distortion of the   mediastinal structures, heart and pulmonary vasculature as detailed above      Continued gradual interval enlargement of the RIGHT pulmonary confluent mass  Stable partially calcified nodular lesions in the RIGHT upper lobe  Stable LEFT upper lobe nodule  Background of bullous emphysema  Progressive compressive atelectasis in   the RIGHT lower lobe    Patchy groundglass opacities in the LEFT lower lobe, mildly improved compared to prior      Extensive liver and upper abdominal metastatic disease without gross change            Prior to Admission Medications   Prescriptions Last Dose Informant Patient Reported? Taking?    OXYCODONE HCL PO   Yes No   Sig: Take 15 mg by mouth every 4 (four) hours as needed   OXYCODONE HCL PO   Yes No   Sig: Take 10 mg by mouth every 4 (four) hours as needed   acetaminophen (TYLENOL) 500 mg tablet   Yes No   Sig: Take 1,000 mg by mouth every 8 (eight) hours   dexamethasone (DECADRON) 4 mg tablet   No No   Sig: Take 1 tablet by mouth daily with breakfast for 30 days   diazepam (VALIUM) 10 mg tablet   Yes No   Sig: Take 5 mg by mouth every evening   diazepam (VALIUM) 5 mg tablet   No No   Sig: Take 1 tablet by mouth every 8 (eight) hours   Patient taking differently: Take 5 mg by mouth every morning     fentaNYL (DURAGESIC) 75 mcg/hr   No No   Sig: Place 1 patch on the skin every third day Max Daily Amount: 1 patch   furosemide (LASIX) 40 mg tablet   Yes No   Sig: Take 40 mg by mouth daily as needed   gabapentin (NEURONTIN) 400 mg capsule   No No   Sig: Take 1 capsule by mouth daily at bedtime   glycerin, adult, (glycerin adult) 2 g rectal suppository   No No   Sig: Insert 1 suppository into the rectum 2 (two) times a day   lactulose 20 g/30 mL   Yes No   Sig: Take 30 g by mouth daily   mirtazapine (REMERON) 15 mg tablet   Yes No   Sig: Take 15 mg by mouth daily at bedtime   nicotine (NICODERM CQ) 21 mg/24 hr TD 24 hr patch   Yes No   Sig: Place 1 patch on the skin every 24 hours   pantoprazole (PROTONIX) 40 mg tablet   Yes No   Sig: Take 40 mg by mouth daily      Facility-Administered Medications: None       Past Medical History:   Diagnosis Date    Anxiety     Continuous opioid dependence (HCC)     GERD (gastroesophageal reflux disease)     Lung cancer (HCC)     B/L Lung CA stage 4       Past Surgical History:   Procedure Laterality Date    PORTACATH PLACEMENT         Family History   Problem Relation Age of Onset    No Known Problems Mother     No Known Problems Father      I have reviewed and agree with the history as documented  Social History   Substance Use Topics    Smoking status: Former Smoker     Quit date: 12/1/2015    Smokeless tobacco: Never Used    Alcohol use No        Review of Systems   Constitutional: Positive for appetite change and fatigue  Negative for fever  HENT: Negative for rhinorrhea and sore throat  Respiratory: Positive for shortness of breath  Negative for cough and wheezing  Cardiovascular: Positive for chest pain and leg swelling  Gastrointestinal: Positive for constipation  Negative for abdominal pain, diarrhea and vomiting  Genitourinary: Negative for dysuria and flank pain  Musculoskeletal: Negative for back pain and neck pain  Skin: Negative for rash  Neurological: Negative for syncope and headaches  Psychiatric/Behavioral:        Mood normal       Physical Exam  ED Triage Vitals [11/28/17 1044]   Temperature Pulse Respirations Blood Pressure SpO2   98 4 °F (36 9 °C) (!) 132 16 96/54 100 %      Temp Source Heart Rate Source Patient Position - Orthostatic VS BP Location FiO2 (%)   Oral Monitor Lying Left arm --      Pain Score       Worst Possible Pain           Orthostatic Vital Signs  Vitals:    11/28/17 1044 11/28/17 1508   BP: 96/54 100/53   Pulse: (!) 132 (!) 129   Patient Position - Orthostatic VS: Lying Lying       Physical Exam   Constitutional: He is oriented to person, place, and time  Pt  Is weak appearing, uncomfortable  HENT:   Head: Normocephalic and atraumatic  Mouth/Throat: Oropharynx is clear and moist    Neck: Normal range of motion  Neck supple  Cardiovascular: Normal rate and regular rhythm  Pulmonary/Chest: Effort normal    Slight decreased breath sounds on R   Abdominal: Soft  There is no tenderness  Musculoskeletal:   LE edema 3+ b/l   Neurological: He is alert and oriented to person, place, and time  Skin: Skin is warm and dry  Nursing note and vitals reviewed        ED Medications  Medications   oxyCODONE (ROXICODONE) immediate release tablet 20 mg (not administered)   ALPRAZolam (XANAX) tablet 0 5 mg (not administered)   sodium chloride 0 9 % bolus 1,000 mL (0 mL Intravenous Stopped 11/28/17 1516)   HYDROmorphone (DILAUDID) 2 mg/mL injection 2 mg (2 mg Intravenous Given 11/28/17 1145)   sodium phosphate-biphosphate (FLEET) enema 1 enema (1 enema Rectal Given 11/28/17 1508)   furosemide (LASIX) injection 40 mg (40 mg Intravenous Given 11/28/17 1513)   HYDROmorphone (DILAUDID) 2 mg/mL injection 2 mg (2 mg Intravenous Given 11/28/17 1422)   HYDROmorphone (DILAUDID) 2 mg/mL injection 2 mg (2 mg Intravenous Given 11/28/17 1604)   heparin lock flush 100 units/mL injection 300 Units (300 Units Intracatheter Given 11/28/17 1525)   ALPRAZolam (XANAX) tablet 0 5 mg (0 5 mg Oral Given 11/28/17 1617)       Diagnostic Studies  Results Reviewed     Procedure Component Value Units Date/Time    POCT urinalysis dipstick [[id number]]  (Normal) Resulted:  11/28/17 1453    Lab Status:  Final result Specimen:  Urine Updated:  11/28/17 1453     Color, UA anastasiia     Clarity, UA clear     EXT Glucose, UA negative     EXT Bilirubin, UA (Ref: Negative) large     EXT Ketones, UA (Ref: Negative) negative     EXT Spec Grav, UA 1 025     EXT Blood, UA (Ref: Negative) trace     EXT pH, UA 6 0     EXT Protein, UA (Ref: Negative) 2,000     EXT Urobilinogen, UA (Ref: 0 2- 1 0) 0 2     EXT Leukocytes, UA (Ref: Negative) negative     EXT Nitrite, UA (Ref: Negative) negative    POCT troponin [[id number]]  (Normal) Collected:  11/28/17 1257    Lab Status:  Final result Updated:  11/28/17 1310     POC Troponin I 0 00 ng/ml      Specimen Type VENOUS    Narrative:         [employer] i-Stat handheld analyzer 99% cutoff is > 0 08ng/mL in network Emergency Departments    o cTnI 99% cutoff is useful only when applied to patients in the clinical setting of myocardial ischemia  o cTnI 99% cutoff should be interpreted in the context of clinical history, ECG findings and possibly cardiac imaging to establish correct diagnosis  o cTnI 99% cutoff may be suggestive but clearly not indicative of a coronary event without the clinical setting of myocardial ischemia  Protime-INR [[id number]]  (Abnormal) Collected:  11/28/17 1155    Lab Status:  Final result Specimen:  Blood from Arm, Right Updated:  11/28/17 1223     Protime 16 2 (H) seconds      INR 1 27 (H)    APTT [[id number]]  (Abnormal) Collected:  11/28/17 1155    Lab Status:  Final result Specimen:  Blood from Arm, Right Updated:  11/28/17 1223     PTT 63 (H) seconds     Narrative: Therapeutic Heparin Range = 60-90 seconds    B-type natriuretic peptide [[id number]]  (Abnormal) Collected:  11/28/17 1155    Lab Status:  Final result Specimen:  Blood from Central Venous Line Updated:  11/28/17 1222     NT-proBNP 633 (H) pg/mL     Comprehensive metabolic panel [[id number]]  (Abnormal) Collected:  11/28/17 1155    Lab Status:  Final result Specimen:  Blood from Central Venous Line Updated:  11/28/17 1216     Sodium 138 mmol/L      Potassium 4 5 mmol/L      Chloride 98 (L) mmol/L      CO2 26 mmol/L      Anion Gap 14 (H) mmol/L      BUN 30 (H) mg/dL      Creatinine 1 08 mg/dL      Glucose 84 mg/dL      Calcium 9 7 mg/dL       (H) U/L       (H) U/L      Alkaline Phosphatase 695 (H) U/L      Total Protein 7 2 g/dL      Albumin 2 3 (L) g/dL      Total Bilirubin 1 50 (H) mg/dL      eGFR 99 ml/min/1 73sq m     Narrative:         National Kidney Disease Education Program recommendations are as follows:  GFR calculation is accurate only with a steady state creatinine  Chronic Kidney disease less than 60 ml/min/1 73 sq  meters  Kidney failure less than 15 ml/min/1 73 sq  meters      CBC and differential [[id number]]  (Abnormal) Collected:  11/28/17 1155    Lab Status:  Final result Specimen:  Blood from Central Venous Line Updated:  11/28/17 1202     WBC 12 72 (H) Thousand/uL      RBC 5 80 (H) Million/uL      Hemoglobin 10 8 (L) g/dL Hematocrit 35 2 (L) %      MCV 61 (L) fL      MCH 18 6 (L) pg      MCHC 30 7 (L) g/dL      RDW 22 7 (H) %      MPV 8 8 (L) fL      Platelets 390 (H) Thousands/uL      nRBC 0 /100 WBCs      Neutrophils Relative 86 (H) %      Lymphocytes Relative 10 (L) %      Monocytes Relative 4 %      Eosinophils Relative 0 %      Basophils Relative 0 %      Neutrophils Absolute 10 91 (H) Thousands/µL      Lymphocytes Absolute 1 21 Thousands/µL      Monocytes Absolute 0 56 Thousand/µL      Eosinophils Absolute 0 00 Thousand/µL      Basophils Absolute 0 01 Thousands/µL                  XR chest 1 view portable   Final Result by Dayron Harris MD (11/28 1225)      Near complete opacification of the right hemithorax representing combination of mass, atelectasis, and pleural effusion  Workstation performed: [id number]                    Procedures  Procedures       Phone Contacts  ED Phone Contact    ED Course  ED Course                                MDM  Number of Diagnoses or Management Options  Constipation:   Intractable pain:   Lower extremity edema:   Metastatic lung cancer (metastasis from lung to other site) [facility]):      Amount and/or Complexity of Data Reviewed  Clinical lab tests: ordered and reviewed  Tests in the radiology section of CPT®: ordered and reviewed    Risk of Complications, Morbidity, and/or Mortality  Presenting problems: moderate  General comments: Pt   Admitted for further work up      [facility], [place], operated by [facility] Time    Disposition  Final diagnoses:   Intractable pain   Lower extremity edema   Constipation   Metastatic lung cancer (metastasis from lung to other site) [facility])     Time reflects when diagnosis was documented in both MDM as applicable and the Disposition within this note     Time User Action Codes Description Comment    11/28/2017  1:51 PM Eder Gomez Add [R52] Intractable pain     11/28/2017  1:51 PM Eder Gomez Add [R60 0] Lower extremity edema     11/28/2017  1:52 PM Priscilla Soares Add [K59 00] Constipation     11/28/2017  1:52 PM Zainab Gomez Add [C34 90] Metastatic lung cancer (metastasis from lung to other site) ([place] [address] )     11/28/2017  2:26 PM Ruby Godwin Add [C34 91] Adenocarcinoma of right lung, stage 4 ([place] [address] )     11/28/2017  2:26 PM Sundar Ramos Modify [C34 91] Adenocarcinoma of right lung, stage 4 ([place] [address] )     11/28/2017  2:26 PM Sundar Ramos Modify [C34 91] Adenocarcinoma of right lung, stage 4 [facility])       ED Disposition     ED Disposition Condition Comment    Admit  Case was discussed with ANTONIO and the patient's admission status was agreed to be inpt /tele      Follow-up Information    None       Patient's Medications   Discharge Prescriptions    No medications on file     No discharge procedures on file      ED Provider  Electronically Signed by           Leilani Gupta MD  11/28/17 5322

## 2017-11-28 NOTE — H&P
History and Physical - [facility]    Patient Information: Charan Ramos 42 y o  male MRN: [id number]  Unit/Bed#: ED 12 Encounter: [id number]  Admitting Physician: Sydney Freeman MD  PCP: No primary care provider on file  Date of Admission:  11/28/17    Assessment/Plan:    Hospital Problem List:     Principal Problem:    Edema  Active Problems:    Lung cancer ([facility] [place] [address] )    Shortness of breath    Opiate dependence, continuous ([facility] [place] [address] )      Plan for the Primary Problem(s):  · Leg edema  · Patient has pretty significant leg edema  This could be multifactorial in nature  This could be secondary to getting aggressive hydration and fluid resuscitation when he was in [place] couple weeks ago  This could also be secondary to 3rd spacing given his malignancy and hypoalbuminemia  We will put the patient on Lasix at 40 mg IV q 12 hours and monitor his intake and outtake  Will do daily weights as well  Plan for Additional Problems:   · Stage IV metastatic lung cancer with worsening pulmonary mass seen on CT scan  Patient does not have a lot of pain associated with that at this time so radiation may not be beneficial at this current time  However if he were to develop pain we may need to transfer to [place] for inpatient radiation  I did discuss this with the patient's primary oncologist Dr Eva Thomas  · Shortness of breath: This is likely secondary to the metastatic lung cancer  The patient has a very large tumor/mass  · Continuous opiate dependence:  Continue with pain control  Any questions in regards to his pain regimen or any changes in his pain regimen will need to be discussed with the patient's primary palliative care physician Dr Obrien  · Constipation:  Will get the patient some relistor  Will increase his bowel regimen      VTE Prophylaxis: Heparin  / sequential compression device   Code Status:  Full code  POLST: POLST is not applicable to this patient    Anticipated Length of Stay:  Patient will be admitted on an Inpatient basis with an anticipated length of stay of  greater than 2 midnights  Justification for Hospital Stay:  Patient will prior to 2 midnights secondary to having pretty significant edema as well as shortness of breath related to his stage IV lung cancer needing aggressive diuresis    Total Time for Visit, including Counseling / Coordination of Care: 45 minutes  Greater than 50% of this total time spent on direct patient counseling and coordination of care  Chief Complaint:   Pain    History of Present Illness:    James Sprague is a 42 y o  male who presents with pain  Patient has a history of stage IV lung cancer and was discharge from the hospital at [facility] couple weeks ago  Patient states while he was there he was put on some aggressive hydration and a lot of IV drips  Since he has been home he has been having difficulty ambulating and has pain when he ambulates secondary to his significant edema which she states was there since he was discharged last time  The patient has been having some shortness of breath as well  The shortness of breath has not been significantly worse  When asked about any pain in the chest area patient does have little bit pain but according to the family at the bedside that pain has not been any worse either  It has been tolerable  The patient right now has a lot of discomfort and states he has not had a bowel movement in 2 and half weeks  According to the wife at the bedside they did make some adjustments the patient's fentanyl on discharge last time  Patient was originally on 37 5 mcg but he was sent home on 100 mcg  Current leak he is on 75 mcg  I did also reach out to the patient's primary oncologist to discuss the case with him  I also reviewed the CT scan report with the wife    She is aware that his pulmonary lesion is enlarging    Review of Systems:    Review of Systems   Constitutional: Positive for activity change, appetite change and fatigue  Respiratory: Positive for shortness of breath  Cardiovascular: Positive for leg swelling  Gastrointestinal: Positive for constipation  Musculoskeletal:        Leg pain   Neurological: Positive for weakness  All other systems reviewed and are negative  Past Medical and Surgical History:     Past Medical History:   Diagnosis Date    Anxiety     Continuous opioid dependence ([address] )     GERD (gastroesophageal reflux disease)     Lung cancer (HCC)     B/L Lung CA stage 4       Past Surgical History:   Procedure Laterality Date    PORTACATH PLACEMENT         Meds/Allergies:    Prior to Admission medications    Medication Sig Start Date End Date Taking?  Authorizing Provider   acetaminophen (TYLENOL) 500 mg tablet Take 1,000 mg by mouth every 8 (eight) hours    Historical Provider, MD   dexamethasone (DECADRON) 4 mg tablet Take 1 tablet by mouth daily with breakfast for 30 days 11/6/17 12/6/17  Heather Green MD   diazepam (VALIUM) 10 mg tablet Take 5 mg by mouth every evening    Historical Provider, MD   diazepam (VALIUM) 5 mg tablet Take 1 tablet by mouth every 8 (eight) hours  Patient taking differently: Take 5 mg by mouth every morning   11/3/17   ELVIA Rangel   fentaNYL ([address]) 75 mcg/hr Place 1 patch on the skin every third day Max Daily Amount: 1 patch 11/6/17   ELVIA Rangel   furosemide (LASIX) 40 mg tablet Take 40 mg by mouth daily as needed    Historical Provider, MD   gabapentin (NEURONTIN) 400 mg capsule Take 1 capsule by mouth daily at bedtime 11/3/17   ELVIA Rangel   glycerin, adult, (glycerin adult) 2 g rectal suppository Insert 1 suppository into the rectum 2 (two) times a day 11/23/17   Wanda Dean MD   lactulose 20 g/30 mL Take 30 g by mouth daily    Historical Provider, MD   mirtazapine (REMERON) 15 mg tablet Take 15 mg by mouth daily at bedtime    Historical Provider, MD   nicotine (NICODERM CQ) 21 mg/24 hr TD 24 hr patch Place 1 patch on the skin every 24 hours    Historical Provider, MD   OXYCODONE HCL PO Take 15 mg by mouth every 4 (four) hours as needed    Historical Provider, MD   OXYCODONE HCL PO Take 10 mg by mouth every 4 (four) hours as needed    Historical Provider, MD   pantoprazole (PROTONIX) 40 mg tablet Take 40 mg by mouth daily    Historical Provider, MD GRAHAM have reviewed home medications with patient personally  Allergies:    Allergies   Allergen Reactions    Zofran [Ondansetron] GI Intolerance       Social History:     Marital Status: /Civil Union     Patient Pre-hospital Living Situation:    Patient Pre-hospital Level of Mobility:  Usually independent  Patient Pre-hospital Diet Restrictions:  None  Substance Use History:   History   Alcohol Use No     History   Smoking Status    Former Smoker    Quit date: 12/1/2015   Smokeless Tobacco    Never Used     History   Drug Use    Types: Marijuana     Comment: daily smoker       Family History:    Family History   Problem Relation Age of Onset    No Known Problems Mother     No Known Problems Father        Physical Exam:     Vitals:   Blood Pressure: 96/54 (11/28/17 1044)  Pulse: (!) 132 (11/28/17 1044)  Temperature: 98 4 °F (36 9 °C) (11/28/17 1044)  Temp Source: Oral (11/28/17 1044)  Respirations: 16 (11/28/17 1044)  Weight - Scale: 87 6 kg (193 lb 2 oz) (11/28/17 1044)  SpO2: 100 % (11/28/17 1044)    Physical Exam    (   General Appearance:    Alert, cooperative, no distress, appears stated age   Head:    Normocephalic, without obvious abnormality, atraumatic   Eyes:    PERRL, conjunctiva/corneas clear, EOM's intact,             Nose:   Nares normal, septum midline, mucosa normal   Throat:   Lips, mucosa, and tongue normal; teeth and gums normal   Neck:   Supple, symmetrical, no adenopathy;        thyroid:  No enlargement/tenderness/nodules; no carotid    bruit or JVD   Back:     Symmetric, no curvature, ROM normal, no CVA tenderness   Lungs:   Decreased breath sounds       Heart:    Regular rate and rhythm, S1 and S2 normal, no murmur, rub    or gallop   Abdomen:     Abdominal discomfort but no rebound or guarding  Bowel sounds are present           Extremities:   Extremities normal, atraumatic, no cyanosis or edema   Pulses:  3+ bilateral pitting pedal edema   Skin:   Skin color, texture, turgor normal, no rashes or lesions   Lymph nodes:   Cervical, supraclavicular, and axillary nodes normal   Neurologic:   CNII-XII intact  Normal strength, sensation and reflexes       throughout       Additional Data:     Lab Results: I have personally reviewed pertinent reports  Results from last 7 days  Lab Units 11/28/17  1155   WBC Thousand/uL 12 72*   HEMOGLOBIN g/dL 10 8*   HEMATOCRIT % 35 2*   PLATELETS Thousands/uL 410*   NEUTROS PCT % 86*   LYMPHS PCT % 10*   MONOS PCT % 4   EOS PCT % 0       Results from last 7 days  Lab Units 11/28/17  1155   SODIUM mmol/L 138   POTASSIUM mmol/L 4 5   CHLORIDE mmol/L 98*   CO2 mmol/L 26   BUN mg/dL 30*   CREATININE mg/dL 1 08   CALCIUM mg/dL 9 7   TOTAL PROTEIN g/dL 7 2   BILIRUBIN TOTAL mg/dL 1 50*   ALK PHOS U/L 695*   ALT U/L 111*   AST U/L 163*   GLUCOSE RANDOM mg/dL 84       Results from last 7 days  Lab Units 11/28/17  1155   INR  1 27*       Imaging: I have personally reviewed pertinent reports  Xr Chest 1 View Portable    Result Date: 11/28/2017  Narrative: CHEST  INDICATION: Shortness of breath  Lung carcinoma  COMPARISON:  CT 11/26/2017  VIEWS:   AP frontal; 1 image FINDINGS: Right chest wall port catheter is present  The cardiomediastinal silhouette is stable  Near complete opacification of the right hemithorax is noted consistent with combination of mass, atelectasis, and pleural effusion  Osseous structures are age appropriate  Impression: Near complete opacification of the right hemithorax representing combination of mass, atelectasis, and pleural effusion   Workstation performed: [id number]     Mri Brain W Wo Contrast    Result Date: 10/31/2017  Narrative: MRI BRAIN WITH AND WITHOUT CONTRAST INDICATION:  Assess for metastatic disease COMPARISON:  None  TECHNIQUE: Sagittal T1, axial T2, axial FLAIR, axial T1, axial [place], axial diffusion  Sagittal, axial and coronal T1 postcontrast   Axial BRAVO post contrast   Patient prepared for and monitored during this exam by anesthesia personnel IV Contrast:  8 mL of gadobutrol injection (MULTI-DOSE)  IMAGE QUALITY:   Diagnostic  FINDINGS: BRAIN PARENCHYMA:  There is no discrete mass, mass effect or midline shift  No abnormal white matter signal identified  Brainstem and cerebellum demonstrate normal signal  There is no intracranial hemorrhage  There is no evidence of acute infarction and  diffusion imaging is unremarkable  Postcontrast imaging of the brain demonstrates no abnormal enhancement  VENTRICLES:  Normal  SELLA AND PITUITARY GLAND:  Normal  ORBITS:  Normal  PARANASAL SINUSES:  Normal  VASCULATURE:  Evaluation of the major intracranial vasculature demonstrates appropriate flow voids  CALVARIUM AND SKULL BASE:  Normal  EXTRACRANIAL SOFT TISSUES:  Prominence of nasopharyngeal soft tissues likely reactive  Impression: Normal enhanced MR of the brain  No Indication of metastatic disease  Workstation performed: [id number]     Mri Thoracic Spine Wo Contrast    Result Date: 11/3/2017  Narrative: MRI THORACIC SPINE WITHOUT CONTRAST INDICATION:  Lung primary widespread metastatic disease COMPARISON:  Bone scan 10/27/2017 TECHNIQUE:  Sagittal T2, and coronal T2 IMAGE QUALITY: Diagnostic but limited, axial images were not obtained, contrast was not administered  FINDINGS: ALIGNMENT: Right convex high dorsal scoliosis  MARROW SIGNAL:  Normal marrow signal is identified within the visualized bony structures  No discrete marrow lesion  THORACIC CORD: Normal signal within the thoracic cord  No compressive disease    Conus terminates at approximately the L1 level  PARAVERTEBRAL SOFT TISSUES:  Multiple lesions are present to include a 16 cm right lung mass, numerous metastases of the liver and right adrenal gland  THORACIC DEGENERATIVE CHANGE: Minor, inconsequential degenerative changes  Impression: Limited examination of the thoracic spine reveals no evidence for cord compression or overt spinal metastases  Workstation performed: [id number]     Mri Cervical Spine W Wo Contrast    Result Date: 10/31/2017  Narrative: MRI CERVICAL SPINE WITH AND WITHOUT CONTRAST INDICATION:  Assessed for metastatic disease COMPARISON:  None  TECHNIQUE:  Sagittal T1, sagittal T2, sagittal inversion recovery, axial 2D merge and axial T2  Sagittal T1 and axial T1 postcontrast   Patient prepared for and monitored during this exam by anesthesia personnel IV Contrast:  8 mL of gadobutrol injection (MULTI-DOSE) IMAGE QUALITY:  Diagnostic  FINDINGS: ALIGNMENT:  Minimal anterolisthesis of C4, degenerative in nature  MARROW SIGNAL:  Normal marrow signal is identified within the visualized bony structures  No discrete marrow lesion  CERVICAL AND VISUALIZED UPPER THORACIC CORD:  Normal signal within the visualized cord  PREVERTEBRAL AND PARASPINAL SOFT TISSUES:  Normal         VISUALIZED POSTERIOR FOSSA:  The visualized posterior fossa demonstrates no abnormal signal  CERVICAL DISC SPACES:     C2-C3:  Normal  C3-C4:  Right greater than left facet arthrosis C4-C5:  Bilateral facet arthrosis, left paramedian annular fissure and small focal protrusion, not clearly compressive  C5-C6:  Minor circumferential bulge, right uncinate and minor bilateral facet arthrosis  C6-C7:  Minor reduction disc height, circumferential bulge with small left posterolateral protrusion  Marginal osteophytes with right greater than left uncinate and bilateral mild facet arthrosis   C7-T1:  Minor facet arthrosis UPPER THORACIC DISC SPACES:  Normal  POSTCONTRAST IMAGING:  Normal      Impression: No indication of metastatic disease  Workstation performed: [id number]     Ir Image Guided Biopsy/aspiration    Result Date: 11/3/2017  Narrative: Ultrasound-guided liver lesion biopsy Clinical History: Metastatic disease   Concious sedation time: 21 minutes Technique: The patient was brought to the interventional radiology area and placed supine on the stretcher  After a brief survey of the liver with ultrasound, a site on the skin was marked, prepped, and draped in the usual sterile fashion  Lidocaine was administered to the skin and a small skin incision was made  A 17 gauge cannula was placed into the right lobe of the liver, up to a visible lesion  4 passes were made with an 18 gauge coring needle through the cannula  D-stat was used for hemostasis  The specimen was found to be adequate, and sent to the lab in formalin  The patient tolerated the procedure well and suffered no complications  Impression: Impression: Successful ultrasound guided core biopsy of a right liver lesion  Workstation performed: [id number]     Cta Chest Pe Study    Result Date: 11/16/2017  Narrative: CTA - CHEST WITH IV CONTRAST - PULMONARY ANGIOGRAM INDICATION: 73-year-old man with known adenocarcinoma the right lung with hepatic and adrenal metastases and mediastinal involvement  Now presenting with shortness of breath and elevated d-dimer level  COMPARISON: CTA of the chest from October 26, 2017  TECHNIQUE: CTA examination of the chest was performed using angiographic technique according to a protocol specifically tailored to evaluate for pulmonary embolism  Reformatted images were created in axial, sagittal, and coronal planes  In addition, coronal 3D MIP postprocessing was performed on the acquisition scanner  Radiation dose length product (DLP) for this visit:  460 31 mGy-cm     This examination, like all CT scans performed in the [facility], was performed utilizing techniques to minimize radiation dose exposure, including the use of iterative  reconstruction and automated exposure control  IV Contrast:  85 mL of iohexol (OMNIPAQUE)      FINDINGS: PULMONARY ARTERIAL TREE:  No evidence of pulmonary embolus  Prominent main pulmonary artery  Right pulmonary artery displaced and compressed by the patient's known large right-sided lung tumor  LUNGS:  Large mass occupying most of the right lung, measuring at least 13 x 17 cm, inseparable from the right hilum and right side of the mediastinum with leftward mediastinal shift  Scattered calcifications in the superior portion of the mass  Subsegmental atelectasis in the base of the right lower lobe  Groundglass opacification throughout much of the left lung, at least a portion of which is probably an artifact of decreased lung volume due to compression by the contralateral mass  Coexisting pulmonary edema could have a similar appearance  Emphysematous changes with large right upper lobe bullae  PLEURA:  Small right pleural effusion  HEART/AORTA:  Heart displaced to the left of midline by the large right lung mass  Aorta normal in caliber  No aortic dissection  Normal branching of the aortic arch  MEDIASTINUM AND ANDREW: Probable invasion of the right side of the mediastinum by the large right lung tumor  Subcarinal lymphadenopathy  Esophagus unremarkable  Trachea and left mainstem bronchus normal   Compression of portions of the right upper lobe  bronchus, bronchus intermedius and right middle lobe bronchus  No convincing evidence of endobronchial tumor invasion  CHEST WALL AND LOWER NECK:       Normal  VISUALIZED STRUCTURES IN THE UPPER ABDOMEN:  Hepatomegaly with multiple liver metastases  Large right adrenal metastasis again noted  OSSEOUS STRUCTURES:  No acute fracture or destructive osseous lesion  Impression: 1  No evidence of pulmonary embolus  2   Large tumor occupying much of the right lung, little change since a CT from 10/26/2017    Probable mediastinal invasion  Compression of portions of the right upper lobe bronchus, bronchus intermedius and right middle lobe bronchus  3   Hepatic metastases  4   Large right adrenal metastasis  Workstation performed: [id number]     Stephanie Sanborn Chest Pe Study    Result Date: 11/26/2017  Narrative: CTA - CHEST WITH IV CONTRAST - PULMONARY ANGIOGRAM INDICATION: Chest pain  Shortness of breath  Stage IV lung cancer COMPARISON: 11/16/2017 TECHNIQUE: CTA examination of the chest was performed using angiographic technique according to a protocol specifically tailored to evaluate for pulmonary embolism  Reformatted images were created in axial, sagittal, and coronal planes  In addition, coronal 3D MIP postprocessing was performed on the acquisition scanner  Radiation dose length product (DLP) for this visit:  477 75 mGy-cm   This examination, like all CT scans performed in the [facility], was performed utilizing techniques to minimize radiation dose exposure, including the use of iterative  reconstruction and automated exposure control  IV Contrast:  85 mL of iohexol (OMNIPAQUE)      FINDINGS: PULMONARY ARTERIAL TREE:  No pulmonary embolus is seen  There is marked distortion of the RIGHT main pulmonary artery and its branches due to the very large RIGHT lung mass with gradually progressive displacement of the mediastinal structures toward the  LEFT  There is severe compression on the RIGHT atrium and confluence with the SVC  There is also marked compression and stretching of the RIGHT pulmonary veins both inferior and superior, also worsened compared to the prior study, overall, there is diminished blood flow seen within the patent vasculature within the RIGHT lower lobe related to this severe mass effect  LUNGS:  There is a very large RIGHT lung mass occupying much of the anterior RIGHT thorax  This measures approximately 20 6 x 14 0 cm    There is continued progressive mass effect and shift of the mediastinal structures toward the LEFT  As discussed above, there is distortion and compression on the SVC, RIGHT atrium, pulmonary arteries and RIGHT pulmonary veins  Underlying lungs again remarkable for bullous emphysematous changes in both upper lobes, RIGHT greater than LEFT  Multiple nodular lesions with intralesional calcification/hyperdensity seen in the posterior RIGHT upper lobe without suspicious change  Stable nodule LEFT apex, 6 mm  Interval improvement in patchy groundglass opacities seen in the LEFT lung, persistent in the lower lobe  Subsegmental consolidation in the RIGHT lower lobe, worsening, likely related to atelectasis related to mass effect  and distortion described above  PLEURA:  Minimal dependent RIGHT pleural fluid/thickening, stable  HEART/AORTA:  Displaced and distorted as detailed above  MEDIASTINUM AND ANDREW:  Marked mass effect and displacement as discussed above  CHEST WALL AND LOWER NECK:       Normal  VISUALIZED STRUCTURES IN THE UPPER ABDOMEN:  Extensive metastatic disease seen throughout the liver  Large retroperitoneal probable RIGHT adrenal lesion also stable  OSSEOUS STRUCTURES:  No acute fracture or destructive osseous lesion  Impression: There is no evidence for pulmonary embolism  Please note this is the patient's 3rd PE study since 10/26/2017  There is no PE  Patient has a very large progressive pulmonary malignancy resulting in continued displacement and distortion of the mediastinal structures, heart and pulmonary vasculature as detailed above  Continued gradual interval enlargement of the RIGHT pulmonary confluent mass  Stable partially calcified nodular lesions in the RIGHT upper lobe  Stable LEFT upper lobe nodule  Background of bullous emphysema  Progressive compressive atelectasis in the RIGHT lower lobe  Patchy groundglass opacities in the LEFT lower lobe, mildly improved compared to prior   Extensive liver and upper abdominal metastatic disease without gross change  Workstation performed: [id number]     Vas Lower Limb Venous Duplex Study, Complete Bilateral    Result Date: 11/17/2017  Narrative:  [facility] REPORT CLINICAL: Indications: Bilateral Edema, unspecified [R60 9]  The patient presents with bilateral leg edema x a couple of weeks  Risk Factors: The patient has history of malignancy and previous smoking (quit 1-5yrs ago)  He has no history of obesity, DVT or limb trauma  Clinical:Left Lower Limb There is edema  Right Lower Limb There is edema  FINDINGS:  Segment  Right            Left              Impression       Impression       CFV      Normal (Patent)  Normal (Patent)     CONCLUSION: Impression: RIGHT LOWER LIMB: No evidence of acute or chronic deep vein thrombosis  No evidence of superficial thrombophlebitis noted  Doppler evaluation shows a normal response to augmentation maneuvers  Popliteal, posterior tibial and anterior tibial arterial Doppler waveforms are triphasic  LEFT LOWER LIMB: No evidence of acute or chronic deep vein thrombosis  No evidence of superficial thrombophlebitis noted  Doppler evaluation shows a normal response to augmentation maneuvers  Popliteal, posterior tibial and anterior tibial arterial Doppler waveforms are triphasic  SIGNATURE: Electronically Signed by: Nile Crow MD on 2017-11-17 08:00:29 PM      EKG, Pathology, and Other Studies Reviewed on Admission:   · CT scan reviewed    Allscripts / Epic Records Reviewed: Yes     ** Please Note: This note has been constructed using a voice recognition system   **

## 2017-11-28 NOTE — ED NOTES
Pt unable to tolerate lying for fleets, pt reports severe pain RLE, pt pleading to sit at edge of bed         Lana Clayton, GÉNESIS  11/28/17 2842

## 2017-11-28 NOTE — ED NOTES
Pt able to tolerate fleets enema with good results - moderate amount very hard stool       Daren Blankenship, RN  11/28/17 [id number]

## 2017-11-28 NOTE — ED NOTES
1:1 requested from charge nurse as pt is a fall risk & continuely wants to sit on edge of bed or stand up       Jim Cronin RN  11/28/17 [address]  11/28/17 1941

## 2017-11-28 NOTE — ED NOTES
Pt continues in position of comfort - sitting on edge of bed with feet on floor, with head on bedside table  Attempted numerous times to encourage pt to lie in bed, pt refuses       Guanako Slaughter RN  11/28/17 0437

## 2017-11-28 NOTE — ED NOTES
Pt lying down in bed but legs remain over the edge of the bed with feet on the floor  Pt is finally sleeping & resting       Jose Toth RN  11/28/17 8435

## 2017-11-29 LAB
ANION GAP SERPL CALCULATED.3IONS-SCNC: 11 MMOL/L (ref 4–13)
BUN SERPL-MCNC: 39 MG/DL (ref 5–25)
CALCIUM SERPL-MCNC: 9.1 MG/DL (ref 8.3–10.1)
CHLORIDE SERPL-SCNC: 97 MMOL/L (ref 100–108)
CO2 SERPL-SCNC: 28 MMOL/L (ref 21–32)
CREAT SERPL-MCNC: 1 MG/DL (ref 0.6–1.3)
ERYTHROCYTE [DISTWIDTH] IN BLOOD BY AUTOMATED COUNT: 22.7 % (ref 11.6–15.1)
GFR SERPL CREATININE-BSD FRML MDRD: 109 ML/MIN/1.73SQ M
GLUCOSE SERPL-MCNC: 87 MG/DL (ref 65–140)
HCT VFR BLD AUTO: 32.9 % (ref 36.5–49.3)
HGB BLD-MCNC: 10 G/DL (ref 12–17)
MCH RBC QN AUTO: 18.3 PG (ref 26.8–34.3)
MCHC RBC AUTO-ENTMCNC: 30.4 G/DL (ref 31.4–37.4)
MCV RBC AUTO: 60 FL (ref 82–98)
PLATELET # BLD AUTO: 364 THOUSANDS/UL (ref 149–390)
PMV BLD AUTO: 9.3 FL (ref 8.9–12.7)
POTASSIUM SERPL-SCNC: 4.6 MMOL/L (ref 3.5–5.3)
RBC # BLD AUTO: 5.46 MILLION/UL (ref 3.88–5.62)
SODIUM SERPL-SCNC: 136 MMOL/L (ref 136–145)
WBC # BLD AUTO: 15.15 THOUSAND/UL (ref 4.31–10.16)

## 2017-11-29 PROCEDURE — 99285 EMERGENCY DEPT VISIT HI MDM: CPT

## 2017-11-29 PROCEDURE — 85027 COMPLETE CBC AUTOMATED: CPT | Performed by: PHYSICIAN ASSISTANT

## 2017-11-29 PROCEDURE — 36415 COLL VENOUS BLD VENIPUNCTURE: CPT | Performed by: PHYSICIAN ASSISTANT

## 2017-11-29 PROCEDURE — 80048 BASIC METABOLIC PNL TOTAL CA: CPT | Performed by: PHYSICIAN ASSISTANT

## 2017-11-29 RX ORDER — ALPRAZOLAM 0.5 MG/1
0.5 TABLET ORAL 3 TIMES DAILY PRN
Status: DISCONTINUED | OUTPATIENT
Start: 2017-11-29 | End: 2017-12-04 | Stop reason: HOSPADM

## 2017-11-29 RX ORDER — DEXAMETHASONE 4 MG/1
4 TABLET ORAL EVERY 12 HOURS SCHEDULED
Status: DISCONTINUED | OUTPATIENT
Start: 2017-11-29 | End: 2017-11-29

## 2017-11-29 RX ORDER — METOCLOPRAMIDE HYDROCHLORIDE 5 MG/ML
10 INJECTION INTRAMUSCULAR; INTRAVENOUS EVERY 6 HOURS PRN
Status: DISCONTINUED | OUTPATIENT
Start: 2017-11-29 | End: 2017-12-04 | Stop reason: HOSPADM

## 2017-11-29 RX ORDER — DRONABINOL 2.5 MG/1
2.5 CAPSULE ORAL
Status: DISCONTINUED | OUTPATIENT
Start: 2017-11-29 | End: 2017-12-01

## 2017-11-29 RX ORDER — DEXAMETHASONE SODIUM PHOSPHATE 10 MG/ML
4 INJECTION, SOLUTION INTRAMUSCULAR; INTRAVENOUS EVERY 8 HOURS SCHEDULED
Status: DISCONTINUED | OUTPATIENT
Start: 2017-11-29 | End: 2017-11-30

## 2017-11-29 RX ORDER — MORPHINE SULFATE 4 MG/ML
4 INJECTION, SOLUTION INTRAMUSCULAR; INTRAVENOUS
Status: DISCONTINUED | OUTPATIENT
Start: 2017-11-29 | End: 2017-11-29

## 2017-11-29 RX ADMIN — ACETAMINOPHEN 975 MG: 325 TABLET ORAL at 03:01

## 2017-11-29 RX ADMIN — FUROSEMIDE 40 MG: 10 INJECTION, SOLUTION INTRAMUSCULAR; INTRAVENOUS at 16:16

## 2017-11-29 RX ADMIN — FUROSEMIDE 40 MG: 10 INJECTION, SOLUTION INTRAMUSCULAR; INTRAVENOUS at 09:18

## 2017-11-29 RX ADMIN — OXYCODONE HYDROCHLORIDE 20 MG: 10 TABLET ORAL at 15:23

## 2017-11-29 RX ADMIN — ACETAMINOPHEN 975 MG: 325 TABLET ORAL at 09:24

## 2017-11-29 RX ADMIN — GABAPENTIN 400 MG: 300 CAPSULE ORAL at 21:25

## 2017-11-29 RX ADMIN — MIRTAZAPINE 15 MG: 15 TABLET, FILM COATED ORAL at 21:25

## 2017-11-29 RX ADMIN — DEXAMETHASONE SODIUM PHOSPHATE 4 MG: 10 INJECTION, SOLUTION INTRAMUSCULAR; INTRAVENOUS at 21:25

## 2017-11-29 RX ADMIN — ACETAMINOPHEN 975 MG: 325 TABLET ORAL at 18:12

## 2017-11-29 RX ADMIN — DEXAMETHASONE 4 MG: 4 TABLET ORAL at 07:38

## 2017-11-29 RX ADMIN — HEPARIN SODIUM 5000 UNITS: 5000 INJECTION, SOLUTION INTRAVENOUS; SUBCUTANEOUS at 21:24

## 2017-11-29 RX ADMIN — HYDROMORPHONE HYDROCHLORIDE 1 MG: 1 INJECTION, SOLUTION INTRAMUSCULAR; INTRAVENOUS; SUBCUTANEOUS at 03:02

## 2017-11-29 RX ADMIN — HYDROMORPHONE HYDROCHLORIDE 1 MG: 1 INJECTION, SOLUTION INTRAMUSCULAR; INTRAVENOUS; SUBCUTANEOUS at 13:14

## 2017-11-29 RX ADMIN — OXYCODONE HYDROCHLORIDE 10 MG: 10 TABLET ORAL at 09:19

## 2017-11-29 RX ADMIN — ALPRAZOLAM 0.5 MG: 0.5 TABLET ORAL at 09:24

## 2017-11-29 RX ADMIN — DEXAMETHASONE SODIUM PHOSPHATE 4 MG: 10 INJECTION, SOLUTION INTRAMUSCULAR; INTRAVENOUS at 13:15

## 2017-11-29 RX ADMIN — Medication 1 TABLET: at 18:12

## 2017-11-29 RX ADMIN — OXYCODONE HYDROCHLORIDE 20 MG: 10 TABLET ORAL at 05:17

## 2017-11-29 RX ADMIN — ALPRAZOLAM 0.5 MG: 0.5 TABLET ORAL at 18:12

## 2017-11-29 RX ADMIN — Medication 1 TABLET: at 09:18

## 2017-11-29 RX ADMIN — HYDROMORPHONE HYDROCHLORIDE 1 MG: 1 INJECTION, SOLUTION INTRAMUSCULAR; INTRAVENOUS; SUBCUTANEOUS at 16:17

## 2017-11-29 RX ADMIN — HEPARIN SODIUM 5000 UNITS: 5000 INJECTION, SOLUTION INTRAVENOUS; SUBCUTANEOUS at 13:15

## 2017-11-29 RX ADMIN — OXYCODONE HYDROCHLORIDE 20 MG: 10 TABLET ORAL at 09:17

## 2017-11-29 RX ADMIN — PANTOPRAZOLE SODIUM 40 MG: 40 TABLET, DELAYED RELEASE ORAL at 09:18

## 2017-11-29 RX ADMIN — DRONABINOL 2.5 MG: 2.5 CAPSULE ORAL at 13:15

## 2017-11-29 RX ADMIN — DRONABINOL 2.5 MG: 2.5 CAPSULE ORAL at 16:16

## 2017-11-29 RX ADMIN — HYDROMORPHONE HYDROCHLORIDE 1 MG: 1 INJECTION, SOLUTION INTRAMUSCULAR; INTRAVENOUS; SUBCUTANEOUS at 07:27

## 2017-11-29 RX ADMIN — OXYCODONE HYDROCHLORIDE 20 MG: 10 TABLET ORAL at 00:39

## 2017-11-29 NOTE — ED NOTES
RN and [facility] discussed bed arrangements options with pt  Discussed with pt that there are currently no beds available at [facility] but were beds available at [facility]  Pt reports RN would have to call his wife to discuss this with her  RN attempted to contact wife several times however was unsuccessful and left voicemail to return call        Lemuel Goodman RN  11/29/17 2050

## 2017-11-29 NOTE — PLAN OF CARE
Prexisting or High Potential for Compromised Skin Integrity     Skin integrity is maintained or improved Adequate for Discharge

## 2017-11-29 NOTE — ED NOTES
Pt sitting at edge of chair   Pt states "this is the only position I can get comfortable in because I am in so much pain "      Butch Diaz RN  11/28/17 5535

## 2017-11-29 NOTE — CASE MANAGEMENT
[address] [place] [address] in the [facility] by Mikey Garay for 2017  Network Utilization Review Department  Phone: [phone]; Fax [phone]  ATTENTION: The Network Utilization Review Department is now centralized for our 7 Facilities  Make a note that we have a new phone and fax numbers for our Department  Please call with any questions or concerns to [phone] and carefully follow the prompts so that you are directed to the right person  All voicemails are confidential  Fax any determinations, approvals, denials, and requests for initial or continue stay review clinical to [phone]  Due to HIGH CALL volume, it would be easier if you could please send faxed requests to expedite your requests and in part, help us provide discharge notifications faster  Initial Clinical Review    Admission: Date/Time/Statement: 11/28/17 @ 1319     Orders Placed This Encounter   Procedures    Inpatient Admission (expected length of stay for this patient is greater than two midnights)     Standing Status:   Standing     Number of Occurrences:   1     Order Specific Question:   Admitting Physician     Answer:   Jerrica Gallardo [[id number]]     Order Specific Question:   Level of Care     Answer:   Med Surg [[id number]]     Order Specific Question:   Estimated length of stay     Answer:   More than 2 Midnights     Order Specific Question:   Certification     Answer:   I certify that inpatient services are medically necessary for this patient for a duration of greater than two midnights  See H&P and MD Progress Notes for additional information about the patient's course of treatment           ED: Date/Time/Mode of Arrival:   ED Arrival Information     Expected Arrival Acuity Means of Arrival Escorted By Service Admission Type    11/28/2017 10:02 11/28/2017 10:01 Urgent Ambulance [facility] Medicine Urgent    Arrival Complaint    SOB          Chief Complaint:   Chief Complaint Patient presents with    Pain     Pt presents to ER via EMS from home with c/o's uncontrolled pain  Pt reports severe pain in from "waist down" & excerbated with movement  Pt is stage IV lung ca  Chronically ill appearing adult presents in no distress  History of Javi Segal is a 42 y o  male who presents with pain  Patient has a history of stage IV lung cancer and was discharge from the hospital at [facility] couple weeks ago  Patient states while he was there he was put on some aggressive hydration and a lot of IV drips  Since he has been home he has been having difficulty ambulating and has pain when he ambulates secondary to his significant edema which she states was there since he was discharged last time  The patient has been having some shortness of breath as well  The shortness of breath has not been significantly worse  When asked about any pain in the chest area patient does have little bit pain but according to the family at the bedside that pain has not been any worse either  It has been tolerable  The patient right now has a lot of discomfort and states he has not had a bowel movement in 2 and half weeks  According to the wife at the bedside they did make some adjustments the patient's fentanyl on discharge last time  Patient was originally on 37 5 mcg but he was sent home on 100 mcg  Current leak he is on 75 mcg  I did also reach out to the patient's primary oncologist to discuss the case with him  I also reviewed the CT scan report with the wife    She is aware that his pulmonary lesion is enlarging    ED Vital Signs:   ED Triage Vitals [11/28/17 1044]   Temperature Pulse Respirations Blood Pressure SpO2   98 4 °F (36 9 °C) (!) 132 16 96/54 100 %      Temp Source Heart Rate Source Patient Position - Orthostatic VS BP Location FiO2 (%)   Oral Monitor Lying Left arm --      Pain Score       Worst Possible Pain        Wt Readings from Last 1 Encounters:   11/28/17 87 6 kg (193 lb 2 oz)       Vital Signs (abnormal):   11/29/17 0722 98 1 °F (36 7 °C)  115 16 99/57 96 % -- RAP   11/28/17 1508 --  129 16 100/53 99 % --      Abnormal Labs/Diagnostic Test Results: pt inr  16 2  1 27, ptt 63, BNP  633, cl  98, an gap  14, BUN creat  30  1 08, ast  163, alt  111, alkphos  695, alb  2 3, total bili  1 50, wbc  12 72, H&H  10 8 35 2, plt  410  CXR - Near complete opacification of the right hemithorax representing combination of mass, atelectasis, and pleural effusion  ED Treatment:   Medication Administration from 11/28/2017 1002 to 11/29/2017 1040       Date/Time Order Dose Route Action Action by Comments     11/28/2017 1516 sodium chloride 0 9 % bolus 1,000 mL 0 mL Intravenous Stopped Ferna Delay, RN      11/28/2017 1155 sodium chloride 0 9 % bolus 1,000 mL 1,000 mL Intravenous New Bag Ferna Delay, RN      11/28/2017 1145 HYDROmorphone (DILAUDID) 2 mg/mL injection 2 mg 2 mg Intravenous Given Ferna Delay, RN      11/28/2017 1508 sodium phosphate-biphosphate (FLEET) enema 1 enema 1 enema Rectal Given Ferna Delay, RN      11/28/2017 1513 furosemide (LASIX) injection 40 mg 40 mg Intravenous Given Ferna Delay, RN      11/28/2017 1422 HYDROmorphone (DILAUDID) 2 mg/mL injection 2 mg 2 mg Intravenous Given Ferna Delay, RN      11/28/2017 1604 HYDROmorphone (DILAUDID) 2 mg/mL injection 2 mg 2 mg Intravenous Given Ferna Delay, RN      11/29/2017 6197 acetaminophen (TYLENOL) tablet 975 mg 975 mg Oral Given [place] Fails, RN      11/29/2017 0301 acetaminophen (TYLENOL) tablet 975 mg 975 mg Oral Given Suraj Donovan, RN      11/28/2017 1947 acetaminophen (TYLENOL) tablet 975 mg 975 mg Oral Given Faustine Brought, RN      11/29/2017 0738 dexamethasone (DECADRON) tablet 4 mg 4 mg Oral Given [place] Fails, RN      11/28/2017 2141 fentaNYL (DURAGESIC) 75 mcg/hr TD 72 hr patch 1 patch 0 patch Transdermal Hold Caroline Saldana RN As per pt wife   Pt Fentanyl patch was applied today 11/28/17 11/29/2017 0918 furosemide (LASIX) injection 40 mg 40 mg Intravenous Given Javier Kaur RN BP 99/57     11/28/2017 2228 gabapentin (NEURONTIN) capsule 400 mg 400 mg Oral Given Peggy Rdz RN      11/29/2017 0919 lactulose 20 g/30 mL oral solution 30 g 30 g Oral Not Given Javier Kaur RN      11/28/2017 2248 mirtazapine (REMERON) tablet 15 mg 15 mg Oral Given Yessy Roldan RN      11/29/2017 0045 nicotine (NICODERM CQ) 21 mg/24 hr TD 24 hr patch 1 patch 1 patch Transdermal Patch Removed Dakota Collado RN      11/29/2017 0917 oxyCODONE (ROXICODONE) immediate release tablet 20 mg 20 mg Oral Given Javier Kaur RN      11/29/2017 0517 oxyCODONE (ROXICODONE) immediate release tablet 20 mg 20 mg Oral Given Dakota Collado RN      11/29/2017 0039 oxyCODONE (ROXICODONE) immediate release tablet 20 mg 20 mg Oral Given Dakota Collado RN      11/28/2017 1946 oxyCODONE (ROXICODONE) immediate release tablet 20 mg 20 mg Oral Given Yessy Roldan RN      11/29/2017 0919 oxyCODONE (ROXICODONE) immediate release tablet 10 mg 10 mg Oral Given Javier Kaur RN      11/29/2017 0918 pantoprazole (PROTONIX) EC tablet 40 mg 40 mg Oral Given Javier Kaur RN      11/29/2017 0518 heparin (porcine) subcutaneous injection 5,000 Units 5,000 Units Subcutaneous Not Given Dakota Collado RN      11/29/2017 0041 heparin (porcine) subcutaneous injection 5,000 Units 5,000 Units Subcutaneous Not Given Dakota Collado RN      11/29/2017 8725 senna-docusate sodium (SENOKOT S) 8 6-50 mg per tablet 1 tablet 1 tablet Oral Given Javier Kaur RN      11/28/2017 2020 senna-docusate sodium (SENOKOT S) 8 6-50 mg per tablet 1 tablet 1 tablet Oral Given Yessy Roldan RN      11/28/2017 2021 methylnaltrexone (RELISTOR) subcutaneous injection 8 mg 8 mg Subcutaneous Given Yessy Roldan, RN      11/29/2017 2868 ALPRAZolam Malissa Carr) tablet 0 5 mg 0 5 mg Oral Given Azael Garcia RN      11/28/2017 2228 ALPRAZolam Durel Alt) tablet 0 5 mg 0 5 mg Oral Given Jean Choudhary RN      11/28/2017 1525 heparin lock flush 100 units/mL injection 300 Units 300 Units Intracatheter Given Daren Blankenship RN      11/28/2017 1617 ALPRAZolam Durel Alt) tablet 0 5 mg 0 5 mg Oral Given Daren Blankenship RN      11/29/2017 0302 HYDROmorphone (DILAUDID) 1 mg/mL injection 1 mg 1 mg Intravenous Given Melissa Garcia RN      11/29/2017 0727 HYDROmorphone (DILAUDID) 1 mg/mL injection 1 mg 1 mg Intravenous Given Azael Garcia RN           Past Medical/Surgical History: Active Ambulatory Problems     Diagnosis Date Noted    Adenocarcinoma of right lung, stage 4 ([facility] ) 07/21/2017    Anxiety 07/21/2017    Arthralgia 07/21/2017    Pain of metastatic malignancy 07/21/2017    [facility] ) 07/21/2017    Decrease in appetite 07/21/2017    Depression 07/21/2017    Insomnia 07/21/2017    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 ([facility] ) 07/21/2017    Nausea 07/21/2017    Neuropathic pain syndrome (non-herpetic) 07/21/2017    Elevated rheumatoid factor 07/21/2017    Under care of palliative care physician 07/21/2017    Abdominal pain 07/21/2017    Back pain 07/21/2017    Chest pain 10/26/2017    Bright red blood per rectum 11/04/2017     Resolved Ambulatory Problems     Diagnosis Date Noted    Rectal bleeding 07/21/2017    Acute blood loss anemia 07/21/2017     Past Medical History:   Diagnosis Date    Anxiety     Continuous opioid dependence ([facility] )     GERD (gastroesophageal reflux disease)     Lung cancer (HCC)        Admitting Diagnosis: SOB (shortness of breath) [R06 02]    Age/Sex: 42 y o  male    Assessment/Plan: Hospital Problem List:    Principal Problem:    Edema  Active Problems:    Lung cancer ([facility] )    Shortness of breath    Opiate dependence, continuous (HCC)   Plan for the Primary Problem(s):  · Leg edema  ?  Patient has pretty significant leg edema   This could be multifactorial in nature  This could be secondary to getting aggressive hydration and fluid resuscitation when he was in [place] couple weeks ago  This could also be secondary to 3rd spacing given his malignancy and hypoalbuminemia  We will put the patient on Lasix at 40 mg IV q 12 hours and monitor his intake and outtake  Will do daily weights as well    Plan for Additional Problems:   · Stage IV metastatic lung cancer with worsening pulmonary mass seen on CT scan  Patient does not have a lot of pain associated with that at this time so radiation may not be beneficial at this current time  However if he were to develop pain we may need to transfer to [place] for inpatient radiation  I did discuss this with the patient's primary oncologist Dr Yen Altamirano  · Shortness of breath: This is likely secondary to the metastatic lung cancer  The patient has a very large tumor/mass  · Continuous opiate dependence:  Continue with pain control  Any questions in regards to his pain regimen or any changes in his pain regimen will need to be discussed with the patient's primary palliative care physician Dr Obrien  · Constipation:  Will get the patient some relistor  Will increase his bowel regimen    VTE Prophylaxis: Heparin  / sequential compression device   Code Status:  Full code  POLST: POLST is not applicable to this patient   Anticipated Length of Stay:  Patient will be admitted on an Inpatient basis with an anticipated length of stay of  greater than 2 midnights     Justification for Hospital Stay:  Patient will prior to 2 midnights secondary to having pretty significant edema as well as shortness of breath related to his stage IV lung cancer needing aggressive diuresis       Admission Orders:  Scheduled Meds:   acetaminophen 975 mg Oral Q8H   ALPRAZolam 0 5 mg Oral BID   dexamethasone 4 mg Oral Q12H [address]   fentaNYL 1 patch Transdermal Q72H   furosemide 40 mg Intravenous BID (diuretic) gabapentin 400 mg Oral HS   heparin (porcine) 5,000 Units Subcutaneous Q8H [address]   lactulose 30 g Oral Daily   methylnaltrexone 8 mg Subcutaneous Every Other Day   mirtazapine 15 mg Oral HS   nicotine 1 patch Transdermal Q24H   pantoprazole 40 mg Oral Daily   senna-docusate sodium 1 tablet Oral BID     Continuous Infusions:    PRN Meds: oxyCODONE    oxyCODONE    sodium phosphate-biphosphate    Reg diet   Act as lisandra   Up w/ assist   Tele   I&O   ekg   Palliative care   SCD  11/29  Cbc , bmp    Cl 97, BUN creat   39  1 00, wbc  15 15, H&H   10 0  32 9

## 2017-11-29 NOTE — ED NOTES
SLIM paged  Pt c/o increased pain and does not have any relief   Pt does not have any IV medications for breakthrough     Osman Garnett RN  11/29/17 4661

## 2017-11-29 NOTE — PROGRESS NOTES
[facility] Internal Medicine Progress Note  Patient: William Linton 42 y o  male   MRN: [id number]  PCP: No primary care provider on file  Unit/Bed#: ED 12 Encounter: [id number]  Date Of Visit: 17    Assessment:    Principal Problem:    Edema  Active Problems:    Lung cancer (HCC)    Shortness of breath    Opiate dependence, continuous (HCC)      Plan:    · Edema: This is secondary to probably fluids as well as secondary to hypoalbuminemia secondary to his advanced cancer  Continue with the Lasix  · Intractable pain secondary to the cancer and the leg swelling  Continue with pain control  I did add IV Dilaudid  · Intractable nausea: Will do Reglan as needed  · Continuous opiate dependence: This is secondary to cancer pain  I did speak to the patient's primary palliative care specialist as an outpatient  · Stage IV lung cancer: Follow up with Dr Jacinto  Patient may need immunotherapy      VTE Pharmacologic Prophylaxis:   Pharmacologic: Heparin  Mechanical VTE Prophylaxis in Place: Yes    Patient Centered Rounds: I have performed bedside rounds with nursing staff today  Discussions with Specialists or Other Care Team Provider:  Discussed with palliative Care this morning    Education and Discussions with Family / Patient: Will discuss with the wife    Time Spent for Care: 20 minutes  More than 50% of total time spent on counseling and coordination of care as described above  Current Length of Stay: 1 day(s)    Current Patient Status: Inpatient   Certification Statement: The patient will continue to require additional inpatient hospital stay due to Having intractable pain    Discharge Plan: not medically stable for discharge    Code Status: Level 1 - Full Code      Subjective:   Patient seen examined    Complains of abdominal pain and nausea    Objective:     Vitals:   Temp (24hrs), Av 4 °F (36 9 °C), Min:98 1 °F (36 7 °C), Max:98 6 °F (37 °C)    HR:  [115-129] 117  Resp:  [16] 16  BP: ()/(53-77) 113/77  SpO2:  [96 %-99 %] 96 %  Body mass index is 24 14 kg/m²  Input and Output Summary (last 24 hours): Intake/Output Summary (Last 24 hours) at 11/29/17 1304  Last data filed at 11/28/17 1516   Gross per 24 hour   Intake             1000 ml   Output              200 ml   Net              800 ml       Physical Exam:     General Appearance:    Alert, cooperative, no distress, appears stated age                               Lungs:     Clear to auscultation bilaterally, respirations unlabored       Heart:    Regular rate and rhythm, S1 and S2 normal, no murmur, rub    or gallop   Abdomen:     Soft, non-tender, bowel sounds active all four quadrants,     no masses, no organomegaly           Extremities:   Extremities normal, atraumatic, no cyanosis or edema                       Additional Data:     Labs:      Results from last 7 days  Lab Units 11/29/17  0731 11/28/17  1155   WBC Thousand/uL 15 15* 12 72*   HEMOGLOBIN g/dL 10 0* 10 8*   HEMATOCRIT % 32 9* 35 2*   PLATELETS Thousands/uL 364 410*   NEUTROS PCT %  --  86*   LYMPHS PCT %  --  10*   MONOS PCT %  --  4   EOS PCT %  --  0       Results from last 7 days  Lab Units 11/29/17  0731 11/28/17  1155   SODIUM mmol/L 136 138   POTASSIUM mmol/L 4 6 4 5   CHLORIDE mmol/L 97* 98*   CO2 mmol/L 28 26   BUN mg/dL 39* 30*   CREATININE mg/dL 1 00 1 08   CALCIUM mg/dL 9 1 9 7   TOTAL PROTEIN g/dL  --  7 2   BILIRUBIN TOTAL mg/dL  --  1 50*   ALK PHOS U/L  --  695*   ALT U/L  --  111*   AST U/L  --  163*   GLUCOSE RANDOM mg/dL 87 84       Results from last 7 days  Lab Units 11/28/17  1155   INR  1 27*       * I Have Reviewed All Lab Data Listed Above  * Additional Pertinent Lab Tests Reviewed:  [facility] Admission Reviewed        Recent Cultures (last 7 days):           Last 24 Hours Medication List:     acetaminophen 975 mg Oral Q8H   dexamethasone 4 mg Intravenous Q8H [address]   dronabinol 2 5 mg Oral BID before lunch/dinner fentaNYL 1 patch Transdermal Q72H   furosemide 40 mg Intravenous BID (diuretic)   gabapentin 400 mg Oral HS   heparin (porcine) 5,000 Units Subcutaneous Q8H [address]   lactulose 30 g Oral Daily   methylnaltrexone 8 mg Subcutaneous Every Other Day   mirtazapine 15 mg Oral HS   nicotine 1 patch Transdermal Q24H   pantoprazole 40 mg Oral Daily   senna-docusate sodium 1 tablet Oral BID        Today, Patient Was Seen By: Mikael Corbin MD    ** Please Note: Dragon 360 Dictation voice to text software may have been used in the creation of this document   **

## 2017-11-30 ENCOUNTER — APPOINTMENT (INPATIENT)
Dept: ULTRASOUND IMAGING | Facility: HOSPITAL | Age: 37
DRG: 136 | End: 2017-11-30
Payer: COMMERCIAL

## 2017-11-30 LAB
ANION GAP SERPL CALCULATED.3IONS-SCNC: 14 MMOL/L (ref 4–13)
ATRIAL RATE: 109 BPM
BUN SERPL-MCNC: 49 MG/DL (ref 5–25)
CALCIUM SERPL-MCNC: 9.4 MG/DL (ref 8.3–10.1)
CHLORIDE SERPL-SCNC: 96 MMOL/L (ref 100–108)
CO2 SERPL-SCNC: 25 MMOL/L (ref 21–32)
CREAT SERPL-MCNC: 1.12 MG/DL (ref 0.6–1.3)
ERYTHROCYTE [DISTWIDTH] IN BLOOD BY AUTOMATED COUNT: 22.5 % (ref 11.6–15.1)
GFR SERPL CREATININE-BSD FRML MDRD: 95 ML/MIN/1.73SQ M
GLUCOSE SERPL-MCNC: 114 MG/DL (ref 65–140)
HCT VFR BLD AUTO: 32.1 % (ref 36.5–49.3)
HGB BLD-MCNC: 10.1 G/DL (ref 12–17)
MCH RBC QN AUTO: 18.7 PG (ref 26.8–34.3)
MCHC RBC AUTO-ENTMCNC: 31.5 G/DL (ref 31.4–37.4)
MCV RBC AUTO: 59 FL (ref 82–98)
P AXIS: 81 DEGREES
PLATELET # BLD AUTO: 333 THOUSANDS/UL (ref 149–390)
PMV BLD AUTO: 9.2 FL (ref 8.9–12.7)
POTASSIUM SERPL-SCNC: 4.8 MMOL/L (ref 3.5–5.3)
PR INTERVAL: 124 MS
QRS AXIS: 90 DEGREES
QRSD INTERVAL: 70 MS
QT INTERVAL: 292 MS
QTC INTERVAL: 393 MS
RBC # BLD AUTO: 5.4 MILLION/UL (ref 3.88–5.62)
SODIUM SERPL-SCNC: 135 MMOL/L (ref 136–145)
T WAVE AXIS: 54 DEGREES
VENTRICULAR RATE: 109 BPM
WBC # BLD AUTO: 20.99 THOUSAND/UL (ref 4.31–10.16)

## 2017-11-30 PROCEDURE — 85027 COMPLETE CBC AUTOMATED: CPT | Performed by: FAMILY MEDICINE

## 2017-11-30 PROCEDURE — 80048 BASIC METABOLIC PNL TOTAL CA: CPT | Performed by: FAMILY MEDICINE

## 2017-11-30 PROCEDURE — 93970 EXTREMITY STUDY: CPT

## 2017-11-30 RX ORDER — DEXAMETHASONE 4 MG/1
4 TABLET ORAL DAILY
Status: DISCONTINUED | OUTPATIENT
Start: 2017-11-30 | End: 2017-12-04 | Stop reason: HOSPADM

## 2017-11-30 RX ORDER — KETOROLAC TROMETHAMINE 30 MG/ML
15 INJECTION, SOLUTION INTRAMUSCULAR; INTRAVENOUS ONCE
Status: COMPLETED | OUTPATIENT
Start: 2017-11-30 | End: 2017-11-30

## 2017-11-30 RX ORDER — DEXAMETHASONE SODIUM PHOSPHATE 10 MG/ML
4 INJECTION, SOLUTION INTRAMUSCULAR; INTRAVENOUS EVERY 12 HOURS SCHEDULED
Status: DISCONTINUED | OUTPATIENT
Start: 2017-11-30 | End: 2017-11-30

## 2017-11-30 RX ORDER — FUROSEMIDE 10 MG/ML
40 INJECTION INTRAMUSCULAR; INTRAVENOUS
Status: DISCONTINUED | OUTPATIENT
Start: 2017-11-30 | End: 2017-12-01

## 2017-11-30 RX ORDER — OXYCODONE HYDROCHLORIDE 5 MG/1
5 TABLET ORAL ONCE
Status: COMPLETED | OUTPATIENT
Start: 2017-11-30 | End: 2017-11-30

## 2017-11-30 RX ORDER — MAGNESIUM CARB/ALUMINUM HYDROX 105-160MG
296 TABLET,CHEWABLE ORAL ONCE
Status: COMPLETED | OUTPATIENT
Start: 2017-11-30 | End: 2017-11-30

## 2017-11-30 RX ADMIN — ACETAMINOPHEN 975 MG: 325 TABLET ORAL at 10:08

## 2017-11-30 RX ADMIN — OXYCODONE HYDROCHLORIDE 5 MG: 5 TABLET ORAL at 05:39

## 2017-11-30 RX ADMIN — OXYCODONE HYDROCHLORIDE 20 MG: 10 TABLET ORAL at 23:49

## 2017-11-30 RX ADMIN — HEPARIN SODIUM 5000 UNITS: 5000 INJECTION, SOLUTION INTRAVENOUS; SUBCUTANEOUS at 14:48

## 2017-11-30 RX ADMIN — FENTANYL 1 PATCH: 75 PATCH, EXTENDED RELEASE TRANSDERMAL at 11:09

## 2017-11-30 RX ADMIN — OXYCODONE HYDROCHLORIDE 20 MG: 10 TABLET ORAL at 19:29

## 2017-11-30 RX ADMIN — NICOTINE 1 PATCH: 21 PATCH, EXTENDED RELEASE TRANSDERMAL at 17:53

## 2017-11-30 RX ADMIN — FUROSEMIDE 40 MG: 10 INJECTION, SOLUTION INTRAMUSCULAR; INTRAVENOUS at 10:07

## 2017-11-30 RX ADMIN — ACETAMINOPHEN 975 MG: 325 TABLET ORAL at 02:46

## 2017-11-30 RX ADMIN — METOCLOPRAMIDE 10 MG: 5 INJECTION, SOLUTION INTRAMUSCULAR; INTRAVENOUS at 10:39

## 2017-11-30 RX ADMIN — Medication 1 TABLET: at 10:08

## 2017-11-30 RX ADMIN — DRONABINOL 2.5 MG: 2.5 CAPSULE ORAL at 10:41

## 2017-11-30 RX ADMIN — PANTOPRAZOLE SODIUM 40 MG: 40 TABLET, DELAYED RELEASE ORAL at 10:08

## 2017-11-30 RX ADMIN — DEXAMETHASONE SODIUM PHOSPHATE 4 MG: 10 INJECTION, SOLUTION INTRAMUSCULAR; INTRAVENOUS at 05:38

## 2017-11-30 RX ADMIN — HEPARIN SODIUM 5000 UNITS: 5000 INJECTION, SOLUTION INTRAVENOUS; SUBCUTANEOUS at 05:38

## 2017-11-30 RX ADMIN — ACETAMINOPHEN 975 MG: 325 TABLET ORAL at 17:51

## 2017-11-30 RX ADMIN — MAGESIUM CITRATE 296 ML: 1.75 LIQUID ORAL at 14:48

## 2017-11-30 RX ADMIN — OXYCODONE HYDROCHLORIDE 20 MG: 10 TABLET ORAL at 14:47

## 2017-11-30 RX ADMIN — HEPARIN SODIUM 5000 UNITS: 5000 INJECTION, SOLUTION INTRAVENOUS; SUBCUTANEOUS at 21:11

## 2017-11-30 RX ADMIN — GABAPENTIN 400 MG: 300 CAPSULE ORAL at 21:10

## 2017-11-30 RX ADMIN — KETOROLAC TROMETHAMINE 15 MG: 30 INJECTION, SOLUTION INTRAMUSCULAR at 02:45

## 2017-11-30 RX ADMIN — DEXAMETHASONE 4 MG: 4 TABLET ORAL at 14:47

## 2017-11-30 RX ADMIN — Medication 1 TABLET: at 17:51

## 2017-11-30 RX ADMIN — DRONABINOL 2.5 MG: 2.5 CAPSULE ORAL at 17:51

## 2017-11-30 RX ADMIN — MIRTAZAPINE 15 MG: 15 TABLET, FILM COATED ORAL at 21:10

## 2017-11-30 RX ADMIN — METHYLNALTREXONE BROMIDE 8 MG: 12 INJECTION, SOLUTION SUBCUTANEOUS at 10:23

## 2017-11-30 NOTE — MALNUTRITION/BMI
This medical record reflects one or more clinical indicators suggestive of malnutrition  Please indicate the one diagnosis below which you feel best reflects the clinical picture  Malnutrition Findings:   chronic illness  other severe protein calorie malnutrition  Related to inadequate energy intake as evidenced by severe muscle mass depletion, protruding clavicle-prominent bone and severe edema (+4) per nsg assessment  BMI Findings:  19-24 9    Body mass index is 23 51 kg/m²  See Nutrition note dated 11/30/17 for additional details  Completed nutrition assessment is viewable in the nutrition documentation

## 2017-11-30 NOTE — PROGRESS NOTES
[facility] Internal Medicine Progress Note  Patient: Brent Waller 42 y o  male   MRN: [id number]  PCP: No primary care provider on file  Unit/Bed#: -01 Encounter: [id number]  Date Of Visit: 11/30/17    Assessment:    Principal Problem:    Edema  Active Problems:    Lung cancer (HCC)    Shortness of breath    Opiate dependence, continuous (HCC)      Plan:    · Edema: This is multifactorial in nature: This is likely secondary to his hypoalbuminemia  Continue with Lasix although I did tell the wife this is only going to be limited in its efficacy  Will add compression stockings  Discussed with palliative care as well  Patient also keeps his legs down all day as well  I did reiterate to him as did pad of care that he needs to keep his legs elevated  Patient is also on steroids for which we will decrease the dose as well  · Continuous opiate dependence secondary to metastatic lung cancer  They may need to consider a pain pump as an outpatient  Continue with current regimen  The wife is reluctant and does not want to increase the patient's fentanyl 200 mcg  We will keep at 75 mcg for now  Will also do some oxycodone for breakthrough pain  · Anxiety:  Ativan as needed  · Lung cancer: Follow up as an outpatient with Dr Zina Jones  · Nausea:  Continue with Reglan as needed  · Constipation: This could likely be opiate induced  I will give the patient some magnesium citrate  The patient also has relistor  · Ambulatory dysfunction:  I do believe the patient may benefit from a hospital bed secondary to terminal lung cancer with significant edema and difficulty ambulating secondary to intractable pain and his terminal lung cancer  VTE Pharmacologic Prophylaxis:   Pharmacologic: Enoxaparin (Lovenox)  Mechanical VTE Prophylaxis in Place: Yes    Patient Centered Rounds: I have performed bedside rounds with nursing staff today      Discussions with Specialists or Other Care Team Provider:  Discussed with the palliative Care this morning    Education and Discussions with Family / Patient:  Discussed with the wife at the bedside    Time Spent for Care: 20 minutes  More than 50% of total time spent on counseling and coordination of care as described above  Current Length of Stay: 2 day(s)    Current Patient Status: Inpatient   Certification Statement: The patient will continue to require additional inpatient hospital stay due to Having intractable pain as well as edema    Discharge Plan:  Hopeful discharge in 24-48 hours    Code Status: Level 1 - Full Code      Subjective:   Patient seen examined  Still states he has not had a adequate bowel movement    Objective:     Vitals:   Temp (24hrs), Av 8 °F (36 6 °C), Min:97 7 °F (36 5 °C), Max:97 9 °F (36 6 °C)    HR:  [106-123] 106  Resp:  [18-20] 18  BP: ()/(45-71) 109/71  SpO2:  [92 %-95 %] 95 %  Body mass index is 23 51 kg/m²  Input and Output Summary (last 24 hours):        Intake/Output Summary (Last 24 hours) at 17 1309  Last data filed at 17 1207   Gross per 24 hour   Intake                0 ml   Output             1300 ml   Net            -1300 ml       Physical Exam:     General Appearance:    Alert, cooperative, no distress, appears stated age                               Lungs:     Clear to auscultation bilaterally, respirations unlabored       Heart:    Regular rate and rhythm, S1 and S2 normal, no murmur, rub    or gallop   Abdomen:     Soft, non-tender, bowel sounds active all four quadrants,     no masses, no organomegaly           Extremities: 3+ bilateral edema                       Additional Data:     Labs:      Results from last 7 days  Lab Units 17  0545  17  1155   WBC Thousand/uL 20 99*  < > 12 72*   HEMOGLOBIN g/dL 10 1*  < > 10 8*   HEMATOCRIT % 32 1*  < > 35 2*   PLATELETS Thousands/uL 333  < > 410*   NEUTROS PCT %  --   --  86*   LYMPHS PCT %  --   --  10*   MONOS PCT %  --   --  4   EOS PCT %  --   --  0   < > = values in this interval not displayed  Results from last 7 days  Lab Units 11/30/17  0545  11/28/17  1155   SODIUM mmol/L 135*  < > 138   POTASSIUM mmol/L 4 8  < > 4 5   CHLORIDE mmol/L 96*  < > 98*   CO2 mmol/L 25  < > 26   BUN mg/dL 49*  < > 30*   CREATININE mg/dL 1 12  < > 1 08   CALCIUM mg/dL 9 4  < > 9 7   TOTAL PROTEIN g/dL  --   --  7 2   BILIRUBIN TOTAL mg/dL  --   --  1 50*   ALK PHOS U/L  --   --  695*   ALT U/L  --   --  111*   AST U/L  --   --  163*   GLUCOSE RANDOM mg/dL 114  < > 84   < > = values in this interval not displayed  Results from last 7 days  Lab Units 11/28/17  1155   INR  1 27*       * I Have Reviewed All Lab Data Listed Above  * Additional Pertinent Lab Tests Reviewed: [facility]        Recent Cultures (last 7 days):           Last 24 Hours Medication List:     acetaminophen 975 mg Oral Q8H   dexamethasone 4 mg Oral Daily   dronabinol 2 5 mg Oral BID before lunch/dinner   fentaNYL 1 patch Transdermal Q72H   furosemide 40 mg Intravenous TID (diuretic)   gabapentin 400 mg Oral HS   heparin (porcine) 5,000 Units Subcutaneous Q8H [facility]   lactulose 30 g Oral Daily   magnesium citrate 296 mL Oral Once   methylnaltrexone 8 mg Subcutaneous Every Other Day   mirtazapine 15 mg Oral HS   nicotine 1 patch Transdermal Q24H   pantoprazole 40 mg Oral Daily   senna-docusate sodium 1 tablet Oral BID        Today, Patient Was Seen By: Marialuisa Recinos MD    ** Please Note: Dragon 360 Dictation voice to text software may have been used in the creation of this document   **

## 2017-11-30 NOTE — CONSULTS
Palliative Care Consultation    Reason for consult: Pain management  Requesting clinician:  Adam Cam    Advanced Care Planning    Advanced Directives: Patient does not have advance directive - would like information  Health Care Directive on file? Health Care Agent:      Comments:   POLST forms: There is no POLST (Physician orders for life-sustaining treatment) form on file for this patient  Code status: Level 1 - Full Code    Palliative Assessment     Functional assessment:  ECOG:  Symptomatic; in bed >50% of the day  Prognosis:  poor    Decisional Capacity: questionable  Patient's understanding of illness:  fair   Patient goals of care:  Pain control    Chief Complaint  Stage IV adenocarcinoma of the lung    History obtained from: chart review and the patient  HPI:  Camacho Garvin is a very pleasant though unfortunate 56-year-old gentleman with stage IV adenocarcinoma of the lung diagnosed in 2015 treated with chemotherapy and immunotherapy under care of Dr Lisa Srinivasan who was admitted 2 days ago due to increase in pain as well as increasing lower extremity edema  Recent CT scans show some progression of disease in the right hemithorax  Ultrasound of lower extremity on November 17th fails to reveal any deep vein thrombosis  He has been followed by palliative care, Dr Ashvin Moreira in the outpatient setting  He had been receiving fentanyl patches 75 mcg in addition to Oxy IR 20 mg every 4 hours as needed  He was also recently started on dexamethasone for pain, markedly positive rheumatoid factor and poor appetite  He was also started on Neurontin for neuropathic pain  At the time of my interview and examination this morning patient was a poor historian  He was sitting on the edge of the bed with his head down and elbows on knees complaining of 10/10 pain  He was unable to tell me if any of his p r n  pain medication has worked for him  Notably, he has not received any p r n  medication since 5:00 p m  yesterday  Yesterday he did receive 90 mg of oxycodone orally and 2 mg of intravenous Dilaudid in addition to his 75 mcg Duragesic patch  Yesterday evening he did have some hypotension with systolic blood pressures as low as 95  He is somewhat somnolent this morning, taking fiber more seconds to answer some of my questions  He was not interested in any further conversation but requests for the to urinary catheter be taken out  Prior Hospitalizations:  multiple    Palliative Care Social History    Social History: Living situation Lives with wife and children  Baptism/Cultural/Spiritual: not applicable     Vitals: Blood pressure 111/67, pulse (!) 111, temperature 97 8 °F (36 6 °C), temperature source Oral, resp  rate 20, height 6' 4" (1 93 m), weight 87 6 kg (193 lb 2 oz), SpO2 94 %  ,Body mass index is 23 51 kg/m²    SPO2 RA Rest    Flowsheet Row ED to Hosp-Admission (Current) from 11/28/2017 in [address] [place] [address] 2nd Floor Med Surg Unit   SpO2  94 %   SpO2 Activity  At Rest   O2 Device  None (Room air)   O2 Flow Rate  2 L/min        I&O:   Intake/Output Summary (Last 24 hours) at 11/30/17 0848  Last data filed at 11/30/17 0146   Gross per 24 hour   Intake                0 ml   Output              550 ml   Net             -550 ml       Historical Information   Past Medical History:   Diagnosis Date    Anxiety     Continuous opioid dependence ([facility] [address] )     GERD (gastroesophageal reflux disease)     Lung cancer (HCC)     B/L Lung CA stage 4     Past Surgical History:   Procedure Laterality Date    PORTACATH PLACEMENT       Social History   History   Alcohol Use No     History   Drug Use    Types: Marijuana     Comment: daily smoker     History   Smoking Status    Former Smoker    Quit date: 12/1/2015   Smokeless Tobacco    Never Used     Family History   Problem Relation Age of Onset    No Known Problems Mother     No Known Problems Father        Meds/Allergies   Allergies   Allergen Reactions    Zofran [Ondansetron] GI Intolerance       Meds:  Current Facility-Administered Medications   Medication Dose Route Frequency Provider Last Rate Last Dose    acetaminophen (TYLENOL) tablet 975 mg  975 mg Oral Q8H Mel Ramos PA-C   975 mg at 11/30/17 0246    ALPRAZolam Reina Starch) tablet 0 5 mg  0 5 mg Oral TID PRN Antione Rainey MD   0 5 mg at 11/29/17 1812    dexamethasone (PF) (DECADRON) injection 4 mg  4 mg Intravenous [facility] Antione Rainey MD   4 mg at 11/30/17 0538    dronabinol (MARINOL) capsule 2 5 mg  2 5 mg Oral BID before lunch/dinner Antione Rainey MD   2 5 mg at 11/29/17 1616    fentaNYL (DURAGESIC) 75 mcg/hr TD 72 hr patch 1 patch  1 patch Transdermal Q72H Rebecca Orta PA-C   Stopped at 11/28/17 2141    furosemide (LASIX) injection 40 mg  40 mg Intravenous BID (diuretic) Lien Ramos PA-C   40 mg at 11/29/17 1616    gabapentin (NEURONTIN) capsule 400 mg  400 mg Oral HS Lien Ramos PA-C   400 mg at 11/29/17 2125    heparin (porcine) subcutaneous injection 5,000 Units  5,000 Units Subcutaneous [facility] Rebecca Orta PA-C   5,000 Units at 11/30/17 0538    HYDROmorphone (DILAUDID) 1 mg/mL injection 1 mg  1 mg Intravenous Q3H PRN Antione Rainey MD   1 mg at 11/29/17 1617    lactulose 20 g/30 mL oral solution 30 g  30 g Oral Daily Mel Ramos PA-C        methylnaltrexone (RELISTOR) subcutaneous injection 8 mg  8 mg Subcutaneous Every Other Day Mel Ramos PA-C   8 mg at 11/28/17 2021    metoclopramide (REGLAN) injection 10 mg  10 mg Intravenous Q6H PRN Antione Rainey MD        mirtazapine (REMERON) tablet 15 mg  15 mg Oral HS Lien Ramos PA-C   15 mg at 11/29/17 2125    nicotine (NICODERM CQ) 21 mg/24 hr TD 24 hr patch 1 patch  1 patch Transdermal Q24H Rebecca Orta PA-C        oxyCODONE (ROXICODONE) immediate release tablet 10 mg  10 mg Oral Q4H PRN Rebecca Orta PA-C   10 mg at 11/29/17 0919    oxyCODONE (ROXICODONE) immediate release tablet 20 mg  20 mg Oral Q4H PRN Lien Nelson EDWINA Ramos   20 mg at 11/29/17 1523    pantoprazole (PROTONIX) EC tablet 40 mg  40 mg Oral Daily Lito Muñoz PA-C   40 mg at 11/29/17 2828    senna-docusate sodium (SENOKOT S) 8 6-50 mg per tablet 1 tablet  1 tablet Oral BID Lito Muñoz PA-C   1 tablet at 11/29/17 1812    sodium phosphate-biphosphate (FLEET) enema 1 enema  1 enema Rectal Daily PRN Lito Muñoz PA-C          Prescriptions Prior to Admission   Medication    acetaminophen (TYLENOL) 500 mg tablet    dexamethasone (DECADRON) 4 mg tablet    diazepam (VALIUM) 10 mg tablet    diazepam (VALIUM) 5 mg tablet    fentaNYL (DURAGESIC) 75 mcg/hr    furosemide (LASIX) 40 mg tablet    gabapentin (NEURONTIN) 400 mg capsule    glycerin, adult, (glycerin adult) 2 g rectal suppository    lactulose 20 g/30 mL    mirtazapine (REMERON) 15 mg tablet    nicotine (NICODERM CQ) 21 mg/24 hr TD 24 hr patch    OXYCODONE HCL PO    OXYCODONE HCL PO    pantoprazole (PROTONIX) 40 mg tablet         Physical Exam:       General Appearance:    Cachectic and chronically ill-appearing   Head:    Normocephalic, without obvious abnormality, atraumatic                   Lungs:     Clear on the left, poor air movement on right, respirations unlabored   Heart:    Regular, tachycardic without murmur       Extremities:  Tender 3+ pitting edema         Labs:    Results from last 7 days  Lab Units 11/30/17  0545 11/29/17  0731 11/28/17  1155 11/26/17  1112   WBC Thousand/uL 20 99* 15 15* 12 72* 11 90*   HEMOGLOBIN g/dL 10 1* 10 0* 10 8* 10 8*   HEMATOCRIT % 32 1* 32 9* 35 2* 35 1*   PLATELETS Thousands/uL 333 364 410* 466*   NEUTROS PCT %  --   --  86*  --    LYMPHS PCT %  --   --  10*  --    LYMPHO PCT %  --   --   --  18   MONOS PCT %  --   --  4  --    MONO PCT MAN %  --   --   --  1*   EOS PCT %  --   --  0  --        Results from last 7 days  Lab Units 11/30/17  0545 11/29/17  0731 11/28/17  1155 11/26/17  1112   SODIUM mmol/L 135* 136 138 140   POTASSIUM mmol/L 4 8 4 6 4 5 4 3   CHLORIDE mmol/L 96* 97* 98* 99*   CO2 mmol/L 25 28 26 32   BUN mg/dL 49* 39* 30* 20   CREATININE mg/dL 1 12 1 00 1 08 0 85   CALCIUM mg/dL 9 4 9 1 9 7 9 4   TOTAL PROTEIN g/dL  --   --  7 2 7 2   BILIRUBIN TOTAL mg/dL  --   --  1 50* 0 70   ALK PHOS U/L  --   --  695* 554*   ALT U/L  --   --  111* 114*   AST U/L  --   --  163* 135*   GLUCOSE RANDOM mg/dL 114 87 84 79     Lab Results   Component Value Date    TROPONINI <0 02 09/16/2015    TROPONINI <0 02 09/16/2015    TROPONINI <0 02 09/15/2015    CKTOTAL 94 09/15/2015       Results from last 7 days  Lab Units 11/28/17  1155   INR  1 27*     Lab Results   Component Value Date    BLOODCX No Growth After 5 Days  10/01/2017    BLOODCX No Growth After 5 Days  04/28/2017    BLOODCX No Growth After 5 Days  04/28/2017       Imaging:  Results for orders placed during the hospital encounter of 11/28/17   XR chest 1 view portable    Narrative CHEST      INDICATION: Shortness of breath  Lung carcinoma  COMPARISON:  CT 11/26/2017  VIEWS:   AP frontal; 1 image    FINDINGS:    Right chest wall port catheter is present  The cardiomediastinal silhouette is stable  Near complete opacification of the right hemithorax is noted consistent with combination of mass, atelectasis, and pleural effusion  Osseous structures are age appropriate  Impression Near complete opacification of the right hemithorax representing combination of mass, atelectasis, and pleural effusion  Workstation performed: [id number]       Results for orders placed during the hospital encounter of 10/26/17   XR chest 2 views    Narrative CHEST - DUAL ENERGY    INDICATION:  Chest pain    COMPARISON:  October 1, 2017    VIEWS:  PA (including soft tissue/bone algorithms) and lateral projections    IMAGES:  4    FINDINGS:  Right-sided line is seen with tip in the right atrium    There is a mass in the right lung which abuts the mediastinal border    This is unchanged from the previous study  No new consolidation  Visualized osseous structures appear within normal limits for the patient's age  Impression No new consolidation seen  No congestion seen  Right lung mass, unchanged from the previous study of October 1, 2017      Workstation performed: [id number]         Admitting Diagnosis:   Principal Problem:    Edema  Active Problems:    Lung cancer ([address] )    Shortness of breath    Opiate dependence, continuous (HCC)  Resolved Problems:    * No resolved hospital problems  *      Plan  Chronic cancer-related pain-patient had more than 160 oral morphine equivalents given as p r n  is between midnight and 5:00 p m  on the 29th, but none since then  It would be reasonable to increase his Duragesic to 100 mcg patch and continue on oral oxycodone 20 mg every 4 hours as needed for breakthrough  Bilateral lower extremity edema is likely multifactorial, contributed to by Neurontin, dexamethasone, hypoalbuminemia and leg dependency  I have advised patient to avoid leg dependency and elevate legs above heart when able  I will discuss with primary team regarding compression stockings and/or Ace wraps  I would consider repeat venous ultrasound to assure there is no DVT given his high risk  I will discuss with his primary palliative care specialist consideration for reducing steroids and/or gabapentin if it is felt the potential side effects outweigh the benefits of these medications  60 minutes were spent with patient today including review of records and interview and examination of patient  I will discuss further with primary team and primary palliative care physician, Dr Lopez [facility]      Corinne Grams, MD  11/30/2017,8:48 AM

## 2017-11-30 NOTE — PROGRESS NOTES
Called by nursing as patient is still in pain and pulling at his port-had to access it multiple times this evening  Will give patient dose of IV toradol at this time given continued hypotension this evening, place of fall precautions, and place in mitts  Will continue to monitor

## 2017-11-30 NOTE — PLAN OF CARE
Problem: DISCHARGE PLANNING - CARE MANAGEMENT  Goal: Discharge to post-acute care or home with appropriate resources  INTERVENTIONS:  - Conduct assessment to determine patient/family and health care team treatment goals, and need for post-acute services based on payer coverage, community resources, and patient preferences, and barriers to discharge  - Address psychosocial, clinical, and financial barriers to discharge as identified in assessment in conjunction with the patient/family and health care team  - Arrange appropriate level of post-acute services according to patients   needs and preference and payer coverage in collaboration with the physician and health care team  - Communicate with and update the patient/family, physician, and health care team regarding progress on the discharge plan  - Arrange appropriate transportation to post-acute venues  Outcome: Progressing  LOS: 2 CCM attempted to meet with pt at bedside  Pt requested CM Department follow up another day as he is in pain and not feeling well today  CCM reported she would notify nurse and CM department would follow up tomorrow  CCM notified nurse  Pt has no other needs at this time

## 2017-11-30 NOTE — SOCIAL WORK
LOS: 2 CCM attempted to meet with pt at bedside  Pt requested CM Department follow up another day as he is in pain and not feeling well today  CCM reported she would notify nurse and CM department would follow up tomorrow  CCM notified nurse  Pt has no other needs at this time

## 2017-11-30 NOTE — PROGRESS NOTES
Pt c/o in pain  States david his pain is a 10/10  Pt is slow to respond  BP 88/48 and HR is 110  Pt has R basilar coarse crackle in his lungs with +4 pitting edema B/L LE  SLIM on call was made aware  SLIM at bedside  0750pm  11/29/17      =========================================      No orders placed  SLIM on call called again  Pt is still in a 10/10 pain  Requested Aqua K pad to help pts pain  Will continue to monitor pt    11/29/17  0841pm    =======================================    Aqua K pad was obtained  Pt is lying comfortably in bed with legs elevated  Will continue to monitor  Lupe Rendon RN  11:23 PM  11/29/17      =========================================    Pt was found sitting at the edge of bed with port deaccessed  Pt was educated not to sit on the side of the bed alone, since he is such a high fall risk  Pt was also told that if he needs any medicine it must go through the port access  Pt complaining of pain and the he needs to urinate  Pt c/o multiple times during night to urinate but did not go when given urinal  ICU RN reaccessed port at this time  Pt lying comfortably in bed  Will continue to monitor  Lupe Rendon RN  12:00AM  11/30/17      =======================================    Pt was found again sitting at the edge of bed with port deaccessed  Pt has been educated multiple times not to sit at the edge of bed because he is very weak and an extremely high fall risk  Pt states he does not remember removing the port access line  On call SLIM was made aware of the situation  Pt is still too hypotensive to give narcotics  One time order of Toradol was ordered  Orders to also reaccess port to give pain medicine  MITT restraints were also ordered  Will apply when port is reaccessed  Pt was repositioned/given water/snacks/applied aqua K pad  He is lying comfortably  Will continue to monitor pt       Lupe Rendon RN  [id number]  11/30/17 ================================        Pt continues to call out in pain  Requests IV to be inserted so he can get his pain medicine as soon as possible  IV inserted  Pain medication admininistered  MITTS applied  Explained to pt that he is pulling out his access and his telemetry on accident and he does not remembering that he does it  He understands and is agreeable to put the MITTS on to prevent from reaccessing multiple times  Radha Arana RN  3:00 AM  11/30/17      =============================================      Pt repeatedly pulled off MITTS  Pt was educated on how important it is not to pull out his IV access  MITTS D/C at this time    0330AM  11/30/17      ========================================     Pts blood pressure is 110/62  On call SLIM made aware  Pain medicine administered  Radha Arana RN  5:34 AM  11/30/17  =====================================    Pt is sitting at the edge of the bed  Pt more oriented at this time  Will continue to monitor       Radha Arana RN  7:18 AM  11/30/17

## 2017-11-30 NOTE — SOCIAL WORK
LOS: 2 SLIM requested CCM meet with pt's wife at bedside  SLIM reported pt and wife declined hospice and will remain with palliative care  CCM met with pt's wife at bedside  Pt's wife reported [place] with her  Pt lives in 2 story home that has 3 steps to enter  Pt uses cane to ambulate  Pt bathes self and receives assistance with other ADL's from wife  Pt has no hx of rehab or HHC  Pt uses [facility]  Pt does not have POA  Pt does not drive and wife transports to appointments  CCM and pt's wife discussed discharge plan  Pt's wife agreed to nurse and would like aide if possible  CCM reported she would send referrals  CCM also provided information for [facility]  Pt's wife is open to provider  Pt's wife requested [employer]  CCM reported she would contact [employer] to request setup and explained eligibility would be determined by [employer]  Pt's wife requested hospital bed, 3 in 1 commode and oxygen testing  CCM reported she would follow up with SLIM and nurse  CCM spoke with SLIM and received script for hospital bed and 3 in 1 commode  CCM requested nurse complete oxygen testing  CCM spoke with Ewelina Mccullough to make him aware  CCM to send referral  CCM sent Remi  referrals  CCM left message for MARGY Dugan to call back regarding meals on wheels referral  Pt has no needs at this time  CM Department to follow up with pt's wife, Gale Cardenas [phone] with updates

## 2017-11-30 NOTE — PLAN OF CARE
DISCHARGE PLANNING     Discharge to home or other facility with appropriate resources Not Progressing        HEMATOLOGIC - ADULT     Maintains hematologic stability Not Progressing        Knowledge Deficit     Patient/family/caregiver demonstrates understanding of disease process, treatment plan, medications, and discharge instructions Not Progressing        METABOLIC, FLUID AND ELECTROLYTES - ADULT     Electrolytes maintained within normal limits Not Progressing     Fluid balance maintained Not Progressing        Nutrition/Hydration-ADULT     Nutrient/Hydration intake appropriate for improving, restoring or maintaining nutritional needs Not Progressing        PAIN - ADULT     Verbalizes/displays adequate comfort level or baseline comfort level Not Progressing        Potential for Falls     Patient will remain free of falls Not Progressing        Prexisting or High Potential for Compromised Skin Integrity     Skin integrity is maintained or improved Not Progressing        SAFETY ADULT     Maintain or return to baseline ADL function Not Progressing     Maintain or return mobility status to optimal level Not Progressing        SAFETY,RESTRAINT: NV/NON-SELF DESTRUCTIVE BEHAVIOR     Remains free of harm/injury (restraint for non violent/non self-detsructive behavior) Not Progressing     Returns to optimal restraint-free functioning Not Progressing        SKIN/TISSUE INTEGRITY - ADULT     Skin integrity remains intact Not Progressing

## 2017-11-30 NOTE — PLAN OF CARE
Problem: DISCHARGE PLANNING - CARE MANAGEMENT  Goal: Discharge to post-acute care or home with appropriate resources  INTERVENTIONS:  - Conduct assessment to determine patient/family and health care team treatment goals, and need for post-acute services based on payer coverage, community resources, and patient preferences, and barriers to discharge  - Address psychosocial, clinical, and financial barriers to discharge as identified in assessment in conjunction with the patient/family and health care team  - Arrange appropriate level of post-acute services according to patients   needs and preference and payer coverage in collaboration with the physician and health care team  - Communicate with and update the patient/family, physician, and health care team regarding progress on the discharge plan  - Arrange appropriate transportation to post-acute venues   Outcome: Progressing  LOS: 2 [facility] requested CCM meet with pt's wife at bedside  SLIM reported pt and wife declined hospice and will remain with palliative care  CCM met with pt's wife at bedside  Pt's wife reported he lives in [facility] with her  Pt lives in 2 story home that has 3 steps to enter  Pt uses cane to ambulate  Pt bathes self and receives assistance with other ADL's from wife  Pt has no hx of rehab or HHC  Pt uses [facility]  Pt does not have POA  Pt does not drive and wife transports to appointments  CCM and pt's wife discussed discharge plan  Pt's wife agreed to nurse and would like aide if possible  CCM reported she would send referrals  CCM also provided information for [facility]  Pt's wife is open to provider  Pt's wife requested [employer]  CCM reported she would contact [employer] to request setup and explained eligibility would be determined by [employer]  Pt's wife requested hospital bed, 3 in 1 commode and oxygen testing  CCM reported she would follow up with SLIM and nurse   CCM spoke with SLIM and received script for hospital bed and 3 in 1 chandra  CCM requested nurse complete oxygen testing  CCM spoke with Alannah Iniguez to make him aware  CCM to send referral  CCM sent [address] referrals  CCM left message for MARGY Herrera to call back regarding [employer] wheels referral  Pt has no needs at this time  CM Department to follow up with pt's wife, Carleen Leon [phone] with updates

## 2017-11-30 NOTE — PROGRESS NOTES
Found patients fentanyl patch, on floor unable to reapply, flushed patch with charge nurse (HERIBERTO), called Dr Mel Moscoso about rescheduling the dose and applied new patch to right deltoid

## 2017-12-01 ENCOUNTER — HOSPITAL ENCOUNTER (OUTPATIENT)
Dept: INFUSION CENTER | Facility: CLINIC | Age: 37
Discharge: HOME | End: 2017-12-01
Payer: COMMERCIAL

## 2017-12-01 LAB
ANION GAP SERPL CALCULATED.3IONS-SCNC: 13 MMOL/L (ref 4–13)
BUN SERPL-MCNC: 50 MG/DL (ref 5–25)
CALCIUM SERPL-MCNC: 9.7 MG/DL (ref 8.3–10.1)
CHLORIDE SERPL-SCNC: 97 MMOL/L (ref 100–108)
CO2 SERPL-SCNC: 26 MMOL/L (ref 21–32)
CREAT SERPL-MCNC: 1.02 MG/DL (ref 0.6–1.3)
ERYTHROCYTE [DISTWIDTH] IN BLOOD BY AUTOMATED COUNT: 22.7 % (ref 11.6–15.1)
GFR SERPL CREATININE-BSD FRML MDRD: 107 ML/MIN/1.73SQ M
GLUCOSE SERPL-MCNC: 97 MG/DL (ref 65–140)
HCT VFR BLD AUTO: 34 % (ref 36.5–49.3)
HGB BLD-MCNC: 10.5 G/DL (ref 12–17)
MCH RBC QN AUTO: 18.2 PG (ref 26.8–34.3)
MCHC RBC AUTO-ENTMCNC: 30.9 G/DL (ref 31.4–37.4)
MCV RBC AUTO: 59 FL (ref 82–98)
PLATELET # BLD AUTO: 353 THOUSANDS/UL (ref 149–390)
PMV BLD AUTO: 9.8 FL (ref 8.9–12.7)
POTASSIUM SERPL-SCNC: 4.7 MMOL/L (ref 3.5–5.3)
RBC # BLD AUTO: 5.76 MILLION/UL (ref 3.88–5.62)
SODIUM SERPL-SCNC: 136 MMOL/L (ref 136–145)
WBC # BLD AUTO: 22.02 THOUSAND/UL (ref 4.31–10.16)

## 2017-12-01 PROCEDURE — 92610 EVALUATE SWALLOWING FUNCTION: CPT

## 2017-12-01 PROCEDURE — 85027 COMPLETE CBC AUTOMATED: CPT | Performed by: FAMILY MEDICINE

## 2017-12-01 PROCEDURE — 80048 BASIC METABOLIC PNL TOTAL CA: CPT | Performed by: FAMILY MEDICINE

## 2017-12-01 RX ORDER — GABAPENTIN 250 MG/5ML
400 SOLUTION ORAL
Status: DISCONTINUED | OUTPATIENT
Start: 2017-12-01 | End: 2017-12-04 | Stop reason: HOSPADM

## 2017-12-01 RX ORDER — POLYETHYLENE GLYCOL 3350 17 G/17G
17 POWDER, FOR SOLUTION ORAL 2 TIMES DAILY
Status: DISCONTINUED | OUTPATIENT
Start: 2017-12-01 | End: 2017-12-04 | Stop reason: HOSPADM

## 2017-12-01 RX ORDER — ONDANSETRON 4 MG/1
4 TABLET, ORALLY DISINTEGRATING ORAL ONCE
Status: COMPLETED | OUTPATIENT
Start: 2017-12-01 | End: 2017-12-01

## 2017-12-01 RX ORDER — FENTANYL 100 UG/H
100 PATCH TRANSDERMAL
Status: DISCONTINUED | OUTPATIENT
Start: 2017-12-01 | End: 2017-12-04 | Stop reason: HOSPADM

## 2017-12-01 RX ORDER — FUROSEMIDE 10 MG/ML
40 INJECTION INTRAMUSCULAR; INTRAVENOUS
Status: DISCONTINUED | OUTPATIENT
Start: 2017-12-01 | End: 2017-12-04 | Stop reason: HOSPADM

## 2017-12-01 RX ADMIN — ACETAMINOPHEN 975 MG: 325 TABLET ORAL at 02:11

## 2017-12-01 RX ADMIN — ONDANSETRON 4 MG: 4 TABLET, ORALLY DISINTEGRATING ORAL at 19:26

## 2017-12-01 RX ADMIN — ACETAMINOPHEN 975 MG: 325 TABLET ORAL at 09:16

## 2017-12-01 RX ADMIN — HYDROMORPHONE HYDROCHLORIDE 1 MG: 1 INJECTION, SOLUTION INTRAMUSCULAR; INTRAVENOUS; SUBCUTANEOUS at 16:03

## 2017-12-01 RX ADMIN — LACTULOSE 30 G: 20 SOLUTION ORAL at 09:15

## 2017-12-01 RX ADMIN — NICOTINE 1 PATCH: 21 PATCH, EXTENDED RELEASE TRANSDERMAL at 16:42

## 2017-12-01 RX ADMIN — Medication 1 TABLET: at 09:16

## 2017-12-01 RX ADMIN — PANTOPRAZOLE SODIUM 40 MG: 40 TABLET, DELAYED RELEASE ORAL at 09:16

## 2017-12-01 RX ADMIN — FENTANYL 100 MCG: 100 PATCH, EXTENDED RELEASE TRANSDERMAL at 12:08

## 2017-12-01 RX ADMIN — HEPARIN SODIUM 5000 UNITS: 5000 INJECTION, SOLUTION INTRAVENOUS; SUBCUTANEOUS at 06:03

## 2017-12-01 RX ADMIN — OXYCODONE HYDROCHLORIDE 20 MG: 10 TABLET ORAL at 17:27

## 2017-12-01 RX ADMIN — HYDROMORPHONE HYDROCHLORIDE 1 MG: 1 INJECTION, SOLUTION INTRAMUSCULAR; INTRAVENOUS; SUBCUTANEOUS at 12:03

## 2017-12-01 RX ADMIN — POLYETHYLENE GLYCOL 3350 17 G: 17 POWDER, FOR SOLUTION ORAL at 12:08

## 2017-12-01 RX ADMIN — HEPARIN SODIUM 5000 UNITS: 5000 INJECTION, SOLUTION INTRAVENOUS; SUBCUTANEOUS at 14:00

## 2017-12-01 RX ADMIN — OXYCODONE HYDROCHLORIDE 10 MG: 10 TABLET ORAL at 09:15

## 2017-12-01 RX ADMIN — HEPARIN SODIUM 5000 UNITS: 5000 INJECTION, SOLUTION INTRAVENOUS; SUBCUTANEOUS at 22:37

## 2017-12-01 RX ADMIN — POLYETHYLENE GLYCOL 3350 17 G: 17 POWDER, FOR SOLUTION ORAL at 16:54

## 2017-12-01 RX ADMIN — OXYCODONE HYDROCHLORIDE 20 MG: 10 TABLET ORAL at 22:37

## 2017-12-01 RX ADMIN — FUROSEMIDE 40 MG: 10 INJECTION, SOLUTION INTRAMUSCULAR; INTRAVENOUS at 06:03

## 2017-12-01 RX ADMIN — DEXAMETHASONE 4 MG: 4 TABLET ORAL at 09:16

## 2017-12-01 NOTE — SOCIAL WORK
LOS:3 CM called PA & Enrollment  office [phone] to begin the enrollment process for [employer] and [facility]  Pt will receive;  _a physician certification form  _Information from [employer]  _A phone call to set up home visit once forms are received from Dr Celso Daniel pt can call [phone] for application information  Pt has no other needs at this time  CM department to follow pt through discharge process

## 2017-12-01 NOTE — PROGRESS NOTES
[facility] Progress Note  Patient: Gilbert Casarez 42 y o  male   MRN: [id number]  PCP: No primary care provider on file  Unit/Bed#: -[facility] Encounter: [id number]  Date Of Visit: 12/01/17    Assessment:    Principal Problem:    Edema  Active Problems:    Lung cancer (HCC)    Shortness of breath    Opiate dependence, continuous (HCC)      Plan:    · Edema: This is likely secondary to his metastatic cancer/hypoalbuminemia  Compression stockings were ordered  I did have on Lasix 40 mg IV q 8 hours  I will decrease to q 12 hours  I did tell the wife this is not could have too much benefit secondary to the reason for his edema  He is 3rd spacing  I did also tell her not to anticipate the edema to get significantly improved  I also reiterated to the patient that he needs to keep his legs elevated  He has just been sitting up in the chair hunched over  · Metastatic lung cancer  Patient has stage IV lung cancer  Patient follows up with Rachel Aguirre as an outpatient  I did have palliative Care see the patient here  They recommended Oncology see the patient here so at least we have a plan of care on discharge  The patient was receiving immunotherapy before  · Continuous opiate dependence  The wife was agreeable to let me increase the patient's fentanyl 200 mcg patch  · Opioid induced constipation  Patient did receive well as store here  Will put the patient on a bowel regimen  He has been having bowel movements now  · Shortness of breath  The patient shortness of breath has improved  · Anxiety:  Will do Ativan as needed  · Nausea:  Continue with Reglan  The patient has no complaints of that at this time  · Ambulatory dysfunction  The patient may benefit from a hospital bed at home  I did give a prescription to case management  I do believe the patient does have terminal lung cancer with significant edema with ambulatory dysfunction and would benefit from that    · Question of dysphagia: We will get a speech evaluation  · Discontinue Remeron  The wife states he is not on that medication  VTE Pharmacologic Prophylaxis:   Pharmacologic: Enoxaparin (Lovenox)  Mechanical VTE Prophylaxis in Place: Yes    Patient Centered Rounds: I have performed bedside rounds with nursing staff today  Discussions with Specialists or Other Care Team Provider:  Discussed with palliative care    Education and Discussions with Family / Patient:  Discussed with the wife at the bedside    Time Spent for Care:  35 minutes  More than 50% of total time spent on counseling and coordination of care as described above  Time was spent in speaking to the wife and formulating a plan of care as well as speaking to specialists involved in the care    Current Length of Stay: 3 day(s)    Current Patient Status: Inpatient   Certification Statement: The patient will continue to require additional inpatient hospital stay due to Still having intractable pain with significant edema    Discharge Plan:  Hopeful discharge     Code Status: Level 1 - Full Code      Subjective:   Patient seen examined  He is still hunched over  Wife is still concerned about his edema  They are also concerned that he is not swallowing as well  I did speak to the nurse  We will get a speech evaluation    Objective:     Vitals:   Temp (24hrs), Av 2 °F (36 8 °C), Min:98 1 °F (36 7 °C), Max:98 3 °F (36 8 °C)    HR:  [100-113] 108  Resp:  [19-20] 20  BP: ()/(57-73) 96/57  SpO2:  [90 %-93 %] 90 %  Body mass index is 23 51 kg/m²  Input and Output Summary (last 24 hours):        Intake/Output Summary (Last 24 hours) at 17 1109  Last data filed at 17 1001   Gross per 24 hour   Intake              180 ml   Output             1625 ml   Net            -1445 ml       Physical Exam:     General Appearance:    Alert, cooperative, no distress, appears stated age                               Lungs:     Clear to auscultation bilaterally, respirations unlabored       Heart:    Regular rate and rhythm, S1 and S2 normal, no murmur, rub    or gallop   Abdomen:     Soft, non-tender, bowel sounds active all four quadrants,     no masses, no organomegaly           Extremities:   3+ edema     Additional Data:     Labs:      Results from last 7 days  Lab Units 12/01/17  0444  11/28/17  1155   WBC Thousand/uL 22 02*  < > 12 72*   HEMOGLOBIN g/dL 10 5*  < > 10 8*   HEMATOCRIT % 34 0*  < > 35 2*   PLATELETS Thousands/uL 353  < > 410*   NEUTROS PCT %  --   --  86*   LYMPHS PCT %  --   --  10*   MONOS PCT %  --   --  4   EOS PCT %  --   --  0   < > = values in this interval not displayed  Results from last 7 days  Lab Units 12/01/17  0444 11/28/17  1155   SODIUM mmol/L 136  < > 138   POTASSIUM mmol/L 4 7  < > 4 5   CHLORIDE mmol/L 97*  < > 98*   CO2 mmol/L 26  < > 26   BUN mg/dL 50*  < > 30*   CREATININE mg/dL 1 02  < > 1 08   CALCIUM mg/dL 9 7  < > 9 7   TOTAL PROTEIN g/dL  --   --  7 2   BILIRUBIN TOTAL mg/dL  --   --  1 50*   ALK PHOS U/L  --   --  695*   ALT U/L  --   --  111*   AST U/L  --   --  163*   GLUCOSE RANDOM mg/dL 97  < > 84   < > = values in this interval not displayed  Results from last 7 days  Lab Units 11/28/17  1155   INR  1 27*       * I Have Reviewed All Lab Data Listed Above  * Additional Pertinent Lab Tests Reviewed:  All [facility] Admission Reviewed        Recent Cultures (last 7 days):           Last 24 Hours Medication List:     acetaminophen 975 mg Oral Q8H   dexamethasone 4 mg Oral Daily   fentaNYL 100 mcg Transdermal Q72H   furosemide 40 mg Intravenous BID (diuretic)   gabapentin 400 mg Oral HS   heparin (porcine) 5,000 Units Subcutaneous Q8H [address]   lactulose 30 g Oral Daily   methylnaltrexone 8 mg Subcutaneous Every Other Day   nicotine 1 patch Transdermal Q24H   pantoprazole 40 mg Oral Daily   senna-docusate sodium 1 tablet Oral BID        Today, Patient Was Seen By: Errol Cassidy MD    ** Please Note: [employer] Dictation voice to text software may have been used in the creation of this document   **

## 2017-12-01 NOTE — PROGRESS NOTES
Pt awake, sitting on BSC hunched over with head in palms  Wife at bedside, she states this is normal for him  Pt very weak, moderate-max assist when ambulating or repositioning  When asked, pt reported pain level 9/10  Medicated with PO oxycodone as ordered  PO meds crushed in applesauce, pt still had difficulty swallowing, swallowing delayed  Dr Jacklyn Yarbrough made aware, Speech consulted  Pt has delayed responses, very rarely says anything  Will continue to monitor

## 2017-12-01 NOTE — PLAN OF CARE
Problem: DISCHARGE PLANNING - CARE MANAGEMENT  Goal: Discharge to post-acute care or home with appropriate resources  INTERVENTIONS:  - Conduct assessment to determine patient/family and health care team treatment goals, and need for post-acute services based on payer coverage, community resources, and patient preferences, and barriers to discharge  - Address psychosocial, clinical, and financial barriers to discharge as identified in assessment in conjunction with the patient/family and health care team  - Arrange appropriate level of post-acute services according to patients   needs and preference and payer coverage in collaboration with the physician and health care team  - Communicate with and update the patient/family, physician, and health care team regarding progress on the discharge plan  - Arrange appropriate transportation to post-acute venues   Outcome: Progressing  LOS:3 CM called PA & Enrollment  office [phone] to begin the enrollment process for [employer] and [employer]  Pt will receive;  _a physician certification form  _Information from [employer]  _A phone call to set up home visit once forms are received from Dr Galloway Portal pt can call [phone] for application information  Pt has no other needs at this time  CM department to follow pt through discharge process

## 2017-12-01 NOTE — PROGRESS NOTES
Progress Note - Ross Roche 42 y o  male MRN: [id number]  Unit/Bed#: -01 Encounter: [id number]    Subjective:   Patient was accompanied by his wife, Elmer Rocha this morning  She did most of the talking  I attempted multiple times to redirect questions to Maryanne Brownlee but he was slow to answer and she answered for him  She made it clear that she believes he can fight this cancer  She does not want to hear stage IV or that there is no cure for this cancer because she believes that it can be cured  She states that his tumor has shrunk with proper treatment in the past and the reason his cancer has advanced is because he has not been following through with the appropriate treatment  She is adamant that she does not want him overmedicated with narcotics because she wants him to be able to think clearly  She wants him to be able to experience the pain and be able to talk about it  She states that he has always been a very verbal person and lately he has not been able to verbalize his thoughts and feelings  She states that at home, the only medication she was giving him was the fentanyl and oxycodone  He was not getting the Neurontin and Remeron  I did not specifically ask about the dexamethasone or furosemide  Maryanne Brownlee did not enunciate a number to associate his current level of pain  All other ROS are negative  Objective:   Vitals: Blood pressure 96/57, pulse (!) 108, temperature 98 1 °F (36 7 °C), temperature source Oral, resp  rate 20, height 6' 4" (1 93 m), weight 87 6 kg (193 lb 2 oz), SpO2 90 %  ,Body mass index is 23 51 kg/m²    SPO2 RA Rest    Flowsheet Row ED to Hosp-Admission (Current) from 11/28/2017 in [facility] 2nd Floor Med Surg Unit   SpO2  90 %   SpO2 Activity  At Rest   O2 Device  None (Room air)   O2 Flow Rate  2 L/min        I&O:   Intake/Output Summary (Last 24 hours) at 12/01/17 1022  Last data filed at 12/01/17 1001   Gross per 24 hour   Intake              180 ml   Output 1625 ml   Net            -1445 ml         Physical Exam:       General Appearance:  Sitting on the edge of the bed with his head in his elbow resting on the bedside table  Periodically he would lift his head and his wife would feed him some eggs  There is persistent 3+ lower extremity pitting edema, compression stockings are in place  Patient was able to stand with my assistance and turned to sit on the bedside commode, but it took considerable effort on my part                                               Invasive Devices     Peripheral Intravenous Line            Peripheral IV 11/30/17 Left Antecubital 1 day                      Social History  reviewed  Family History   Problem Relation Age of Onset    No Known Problems Mother     No Known Problems Father     reviewed    Meds:  Current Facility-Administered Medications   Medication Dose Route Frequency Provider Last Rate Last Dose    acetaminophen (TYLENOL) tablet 975 mg  975 mg Oral Q8H Mel Ramos PA-C   975 mg at 12/01/17 0458    ALPRAZolam (XANAX) tablet 0 5 mg  0 5 mg Oral TID PRN Oleg Mayfield MD   0 5 mg at 11/29/17 1812    dexamethasone (DECADRON) tablet 4 mg  4 mg Oral Daily Oleg Mayfield MD   4 mg at 12/01/17 0916    fentaNYL (DURAGESIC) 75 mcg/hr TD 72 hr patch 1 patch  1 patch Transdermal Q72H Oleg Mayfield MD   1 patch at 11/30/17 1109    furosemide (LASIX) injection 40 mg  40 mg Intravenous TID (diuretic) Oleg Mayfield MD   40 mg at 12/01/17 0603    heparin (porcine) subcutaneous injection 5,000 Units  5,000 Units Subcutaneous [facility] Jacky Cummins PA-C   5,000 Units at 12/01/17 0603    HYDROmorphone (DILAUDID) 1 mg/mL injection 1 mg  1 mg Intravenous Q3H PRN Oleg Mayfield MD   1 mg at 11/29/17 1617    lactulose 20 g/30 mL oral solution 30 g  30 g Oral Daily Jacky Cummins PA-C   30 g at 12/01/17 0915    methylnaltrexone (RELISTOR) subcutaneous injection 8 mg  8 mg Subcutaneous Every Other Day Jacky Cummins PA-C   8 mg at 11/30/17 1023    metoclopramide (REGLAN) injection 10 mg  10 mg Intravenous Q6H PRN Oleg Mayfield MD   10 mg at 11/30/17 1039    nicotine (NICODERM CQ) 21 mg/24 hr TD 24 hr patch 1 patch  1 patch Transdermal Q24H Julio Delay, PA-C   1 patch at 11/30/17 1753    oxyCODONE (ROXICODONE) immediate release tablet 10 mg  10 mg Oral Q4H PRN Julio Delay, PA-C   10 mg at 12/01/17 0915    oxyCODONE (ROXICODONE) immediate release tablet 20 mg  20 mg Oral Q4H PRN Julio Delay, PA-C   20 mg at 11/30/17 2349    pantoprazole (PROTONIX) EC tablet 40 mg  40 mg Oral Daily Julio Delay, PA-C   40 mg at 12/01/17 5058    senna-docusate sodium (SENOKOT S) 8 6-50 mg per tablet 1 tablet  1 tablet Oral BID Jacky Delay, PA-C   1 tablet at 12/01/17 3500    sodium phosphate-biphosphate (FLEET) enema 1 enema  1 enema Rectal Daily PRN Julio Delay, PA-C          Prescriptions Prior to Admission   Medication    acetaminophen (TYLENOL) 500 mg tablet    dexamethasone (DECADRON) 4 mg tablet    diazepam (VALIUM) 10 mg tablet    diazepam (VALIUM) 5 mg tablet    fentaNYL (DURAGESIC) 75 mcg/hr    furosemide (LASIX) 40 mg tablet    gabapentin (NEURONTIN) 400 mg capsule    glycerin, adult, (glycerin adult) 2 g rectal suppository    lactulose 20 g/30 mL    mirtazapine (REMERON) 15 mg tablet    nicotine (NICODERM CQ) 21 mg/24 hr TD 24 hr patch    OXYCODONE HCL PO    OXYCODONE HCL PO    pantoprazole (PROTONIX) 40 mg tablet       Labs:    Results from last 7 days  Lab Units 12/01/17  0444 11/30/17  0545 11/29/17  0731 11/28/17  1155 11/26/17  1112   WBC Thousand/uL 22 02* 20 99* 15 15* 12 72* 11 90*   HEMOGLOBIN g/dL 10 5* 10 1* 10 0* 10 8* 10 8*   HEMATOCRIT % 34 0* 32 1* 32 9* 35 2* 35 1*   PLATELETS Thousands/uL 353 333 364 410* 466*   NEUTROS PCT %  --   --   --  86*  --    LYMPHS PCT %  --   --   --  10*  --    LYMPHO PCT %  --   --   --   --  18   MONOS PCT %  --   --   --  4  --    MONO PCT MAN %  --   --   -- --  1*   EOS PCT %  --   --   --  0  --        Results from last 7 days  Lab Units 12/01/17  0444 11/30/17  0545 11/29/17  0731 11/28/17  1155 11/26/17  1112   SODIUM mmol/L 136 135* 136 138 140   POTASSIUM mmol/L 4 7 4 8 4 6 4 5 4 3   CHLORIDE mmol/L 97* 96* 97* 98* 99*   CO2 mmol/L 26 25 28 26 32   BUN mg/dL 50* 49* 39* 30* 20   CREATININE mg/dL 1 02 1 12 1 00 1 08 0 85   CALCIUM mg/dL 9 7 9 4 9 1 9 7 9 4   TOTAL PROTEIN g/dL  --   --   --  7 2 7 2   BILIRUBIN TOTAL mg/dL  --   --   --  1 50* 0 70   ALK PHOS U/L  --   --   --  695* 554*   ALT U/L  --   --   --  111* 114*   AST U/L  --   --   --  163* 135*   GLUCOSE RANDOM mg/dL 97 114 87 84 79     Lab Results   Component Value Date    TROPONINI <0 02 09/16/2015    TROPONINI <0 02 09/16/2015    TROPONINI <0 02 09/15/2015    CKTOTAL 94 09/15/2015       Results from last 7 days  Lab Units 11/28/17  1155   INR  1 27*     Lab Results   Component Value Date    BLOODCX No Growth After 5 Days  10/01/2017    BLOODCX No Growth After 5 Days  04/28/2017    BLOODCX No Growth After 5 Days  04/28/2017       Imaging:  Results for orders placed during the hospital encounter of 11/28/17   XR chest 1 view portable    Narrative CHEST      INDICATION: Shortness of breath  Lung carcinoma  COMPARISON:  CT 11/26/2017  VIEWS:   AP frontal; 1 image    FINDINGS:    Right chest wall port catheter is present  The cardiomediastinal silhouette is stable  Near complete opacification of the right hemithorax is noted consistent with combination of mass, atelectasis, and pleural effusion  Osseous structures are age appropriate  Impression Near complete opacification of the right hemithorax representing combination of mass, atelectasis, and pleural effusion        Workstation performed: [id number]       Results for orders placed during the hospital encounter of 10/26/17   XR chest 2 views    Narrative CHEST - DUAL ENERGY    INDICATION:  Chest pain    COMPARISON:  October 1, 2017    VIEWS:  PA (including soft tissue/bone algorithms) and lateral projections    IMAGES:  4    FINDINGS:  Right-sided line is seen with tip in the right atrium    There is a mass in the right lung which abuts the mediastinal border  This is unchanged from the previous study  No new consolidation  Visualized osseous structures appear within normal limits for the patient's age  Impression No new consolidation seen  No congestion seen  Right lung mass, unchanged from the previous study of October 1, 2017      Workstation performed: [id number]         VTE Pharmacologic Prophylaxis: Heparin      Code Status:   Level 1 - Full Code    Assessment:  Principal Problem:    Edema  Active Problems:    Lung cancer ([address] )    Shortness of breath    Opiate dependence, continuous ([facility] [address] )  Resolved Problems:    * No resolved hospital problems  *      Plan:  · I had a lengthy discussion with Leisa Lee and his wife regarding the management of his pain, the side effects of narcotics, tolerance, dependence, side effects and pharmacokinetics  We discussed the emotional and anticipatory distress that can occur with short-acting narcotics such as oxycodone  I discussed my recommendation to increase the Duragesic to 100 mcg and monitor his response  We discussed did discontinuation of the Neurontin as this could potentially have caused some sedation as it was introduced to him this hospitalization  ·   · We also discussed the realities of stage IV cancer and the medical goals which would be to treat the disease, but unfortunately cure is highly unlikely  ·   · I have reached out to his primary Palliative Care Physician to discuss further and await a call back at this time  I have discussed my concerns with Dr Soto Later  Patient may also benefit from inpatient oncology consultation         Alphonso Benites MD  12/1/2017,10:22 AM

## 2017-12-01 NOTE — PROGRESS NOTES
Pt's wife left, stated she will be back later this afternoon  Bed alarm applied  Reinforced importance of using call bell for assistance

## 2017-12-01 NOTE — SPEECH THERAPY NOTE
Speech-Language Pathology Bedside Swallow Evaluation        Patient Name: Brent Waller    Today's Date: 12/1/2017     Problem List  Patient Active Problem List   Diagnosis    Adenocarcinoma of right lung, stage 4 (HCC)    Anxiety    Arthralgia    Pain of metastatic malignancy    Continuous opioid dependence ([address] )    Decrease in appetite    Depression    Insomnia    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)    Nausea    Neuropathic pain syndrome (non-herpetic)    Elevated rheumatoid factor    Under care of palliative care physician    Abdominal pain    Back pain    Chest pain    Bright red blood per rectum    Lung cancer (HCC)    Edema    Shortness of breath    Opiate dependence, continuous ([address] )       Past Medical History  Past Medical History:   Diagnosis Date    Anxiety     Continuous opioid dependence ([address] )     GERD (gastroesophageal reflux disease)     Lung cancer (HCC)     B/L Lung CA stage 4       Past Surgical History  Past Surgical History:   Procedure Laterality Date    PORTACATH PLACEMENT           Current Medical Status  Pt is a 42 y o  male who [facility] on 11/28 with severe edema 2/2 metastatic lung cancer and hypoalbuminemia  He had been tolerating a Regular/Thin Liquid diet without difficulty until earlier today, when GÉNESIS Gallo witnessed him orally holding food and medications without any attempt to actually swallow them  ST was consulted to evaluate safety for PO intake  At the time of this evaluation, the pt c/o "severe" pain (RN aware, see progress note from today 16:45), but states that he is thirsty and is requesting ice water  Imaging Studies:  CXR 11/28 Near complete opacification of the right hemithorax representing combination of mass, atelectasis, and pleural effusion        Swallow Information   Current Risks for Dysphagia & Aspiration: AMS     Current Symptoms/Concerns: oral holding of PO intake    Current Diet: regular diet and thin liquids      Baseline Diet: regular diet and thin liquids      Baseline Assessment   Behavior/Cognition: alert, decreased attention, oriented to person but no response to questions re: place, time, or situation    Speech/Language Status: able to participate in conversation and able to follow commands    Patient Positioning: upright in bed       Swallow Mechanism Exam   Facial: symmetrical  Labial: WFL  Lingual: WFL  Velum: symmetrical  Mandible: adequate ROM  Dentition: adequate  Vocal quality:clear/adequate   Volitional Cough: strong/productive       Consistencies Assessed and Performance   Consistencies Administered: thin liquids; pt refused all other offered PO   Specific materials administered included ice water  Oral Stage: WFL    Pharyngeal Stage: WFL    Swallow Mechanics:  Swallowing initiation appeared prompt  Laryngeal rise was palpated and judged to be within functional limits  No coughing, throat clearing, change in vocal quality or respirator status noted  Esophageal Concerns: none reported      Summary   Limited evaluation 2/2 pt refusal, however, pt p/w grossly functional oral and pharyngeal swallow function with oral motor evaluation and with thin liquid trials  Unable to assess with other textures due to pt refusal  Suspect that pt's swallow function fluctuates with mental status      Risk for Aspiration: Low with assessed textures    Recommendations: if pt continues to have unusual behavior with PO, consider downgrade to Dysphagia 1/Puree and Thin Liquids; otherwise, continue Regular diet and Thin liquids     Recommended Form of Meds: whole with liquid; crush as needed     Aspiration precautions and compensatory swallowing strategies: upright posture, only feed when fully alert, slow rate of feeding and small bites/sips    Results Reviewed with: GÉNESIS     Dysphagia Goals: Patient will tolerate the safest and least restrictive diet without overt s/sx aspiration x100%    Frequency of treatment: 1-3x/wk      Speech Therapy Prognosis   Prognosis: Fair    Prognosis Considerations: medical status and medically fragile status      Plan  If pt continues to display oral holding or other unusual behavior with PO intake, consider downgrade to Dysphagia 1/Puree and Thin Liquids  Otherwise, continue current Regular/Thin Liquid diet  ST will continue to follow for ongoing assessment

## 2017-12-01 NOTE — PROGRESS NOTES
Pt still reports 10/10 unrelieved pain "all over"  Pt refusing to elevate B/L LE  Swelling appears worse in B/L LE from  AM assessment  Pt continuing to sit on edge of bed hunched over  Attempted to educate pt on importance of elevating legs, pt ignores and asks for "5 more minutes"  /72 manually at this time  Dr Kevan Clement made aware, will hold 1600 dose of IV Lasix

## 2017-12-02 ENCOUNTER — APPOINTMENT (INPATIENT)
Dept: RADIOLOGY | Facility: HOSPITAL | Age: 37
DRG: 136 | End: 2017-12-02
Payer: COMMERCIAL

## 2017-12-02 ENCOUNTER — APPOINTMENT (INPATIENT)
Dept: CT IMAGING | Facility: HOSPITAL | Age: 37
DRG: 136 | End: 2017-12-02
Payer: COMMERCIAL

## 2017-12-02 LAB
ANION GAP SERPL CALCULATED.3IONS-SCNC: 12 MMOL/L (ref 4–13)
BUN SERPL-MCNC: 47 MG/DL (ref 5–25)
CALCIUM SERPL-MCNC: 9.7 MG/DL (ref 8.3–10.1)
CHLORIDE SERPL-SCNC: 98 MMOL/L (ref 100–108)
CO2 SERPL-SCNC: 27 MMOL/L (ref 21–32)
CREAT SERPL-MCNC: 0.96 MG/DL (ref 0.6–1.3)
ERYTHROCYTE [DISTWIDTH] IN BLOOD BY AUTOMATED COUNT: 22.6 % (ref 11.6–15.1)
GFR SERPL CREATININE-BSD FRML MDRD: 115 ML/MIN/1.73SQ M
GLUCOSE SERPL-MCNC: 97 MG/DL (ref 65–140)
HCT VFR BLD AUTO: 32.3 % (ref 36.5–49.3)
HGB BLD-MCNC: 10.1 G/DL (ref 12–17)
MCH RBC QN AUTO: 18.6 PG (ref 26.8–34.3)
MCHC RBC AUTO-ENTMCNC: 31.3 G/DL (ref 31.4–37.4)
MCV RBC AUTO: 60 FL (ref 82–98)
PLATELET # BLD AUTO: 299 THOUSANDS/UL (ref 149–390)
PMV BLD AUTO: 9.1 FL (ref 8.9–12.7)
POTASSIUM SERPL-SCNC: 4.7 MMOL/L (ref 3.5–5.3)
RBC # BLD AUTO: 5.43 MILLION/UL (ref 3.88–5.62)
SODIUM SERPL-SCNC: 137 MMOL/L (ref 136–145)
WBC # BLD AUTO: 21.13 THOUSAND/UL (ref 4.31–10.16)

## 2017-12-02 PROCEDURE — 72125 CT NECK SPINE W/O DYE: CPT

## 2017-12-02 PROCEDURE — 73521 X-RAY EXAM HIPS BI 2 VIEWS: CPT

## 2017-12-02 PROCEDURE — 72220 X-RAY EXAM SACRUM TAILBONE: CPT

## 2017-12-02 PROCEDURE — 85027 COMPLETE CBC AUTOMATED: CPT | Performed by: FAMILY MEDICINE

## 2017-12-02 PROCEDURE — 80048 BASIC METABOLIC PNL TOTAL CA: CPT | Performed by: FAMILY MEDICINE

## 2017-12-02 PROCEDURE — 70450 CT HEAD/BRAIN W/O DYE: CPT

## 2017-12-02 RX ADMIN — ACETAMINOPHEN 975 MG: 325 TABLET ORAL at 02:40

## 2017-12-02 RX ADMIN — FUROSEMIDE 40 MG: 10 INJECTION, SOLUTION INTRAMUSCULAR; INTRAVENOUS at 08:18

## 2017-12-02 RX ADMIN — ALPRAZOLAM 0.5 MG: 0.5 TABLET ORAL at 22:00

## 2017-12-02 RX ADMIN — OXYCODONE HYDROCHLORIDE 20 MG: 10 TABLET ORAL at 08:18

## 2017-12-02 RX ADMIN — OXYCODONE HYDROCHLORIDE 20 MG: 10 TABLET ORAL at 18:45

## 2017-12-02 RX ADMIN — ACETAMINOPHEN 975 MG: 325 TABLET ORAL at 11:30

## 2017-12-02 RX ADMIN — HEPARIN SODIUM 5000 UNITS: 5000 INJECTION, SOLUTION INTRAVENOUS; SUBCUTANEOUS at 13:18

## 2017-12-02 RX ADMIN — ACETAMINOPHEN 975 MG: 325 TABLET ORAL at 17:09

## 2017-12-02 RX ADMIN — POLYETHYLENE GLYCOL 3350 17 G: 17 POWDER, FOR SOLUTION ORAL at 17:11

## 2017-12-02 RX ADMIN — HEPARIN SODIUM 5000 UNITS: 5000 INJECTION, SOLUTION INTRAVENOUS; SUBCUTANEOUS at 21:58

## 2017-12-02 RX ADMIN — FUROSEMIDE 40 MG: 10 INJECTION, SOLUTION INTRAMUSCULAR; INTRAVENOUS at 17:17

## 2017-12-02 RX ADMIN — OXYCODONE HYDROCHLORIDE 20 MG: 10 TABLET ORAL at 13:19

## 2017-12-02 RX ADMIN — DEXAMETHASONE 4 MG: 4 TABLET ORAL at 08:18

## 2017-12-02 RX ADMIN — OXYCODONE HYDROCHLORIDE 10 MG: 10 TABLET ORAL at 23:34

## 2017-12-02 RX ADMIN — LACTULOSE 30 G: 20 SOLUTION ORAL at 08:17

## 2017-12-02 RX ADMIN — GABAPENTIN 400 MG: 250 SOLUTION ORAL at 21:57

## 2017-12-02 RX ADMIN — NICOTINE 1 PATCH: 21 PATCH, EXTENDED RELEASE TRANSDERMAL at 17:14

## 2017-12-02 RX ADMIN — POLYETHYLENE GLYCOL 3350 17 G: 17 POWDER, FOR SOLUTION ORAL at 08:18

## 2017-12-02 RX ADMIN — PANTOPRAZOLE SODIUM 40 MG: 40 TABLET, DELAYED RELEASE ORAL at 08:18

## 2017-12-02 NOTE — CONSULTS
Patient: Any Kirby  Patient MRN: [id number]  Service date: 12/1/2017  Attending Physician:       CHIEF COMPLAIN  Chief Complaint   Patient presents with    Pain     Pt presents to ER via EMS from home with c/o's uncontrolled pain  Pt reports severe pain in from "waist down" & excerbated with movement  Pt is stage IV lung ca  Chronically ill appearing adult presents in no distress  Heme / Oncology history:  Any Kirby is a 42 y o  male with metastatic lung adenocarcinoma  - diagnosed in 5/2015; started with a trial, and dropped off due to non-compliance  - started opdivo / immunotherapy from 2016, stopped in 5/2016 after 3 treatments due to side effects    - last evaluation by oncology 11/16, about to restart opdivo  HISTORY OF PRESENT ILLNESS:  Any Kirby is a 42 y o  male who presents with lower extremities edema and worsening pain; receiving lasix  Pt has no complains  Discussed with nurse, I was informed pt has been " down " for the past 3 days and sitting at the bed edge without communicating with staff; he has not eating lunch / diner  PAST MEDICAL HISTORY:   has a past medical history of Anxiety; Continuous opioid dependence ([facility] [address] ); GERD (gastroesophageal reflux disease); and Lung cancer ([facility] [address] )  PAST SURGICAL HISTORY:   has a past surgical history that includes Portacath placement  CURRENT MEDICATIONS  Scheduled Meds:  acetaminophen 975 mg Oral Q8H   dexamethasone 4 mg Oral Daily   fentaNYL 100 mcg Transdermal Q72H   furosemide 40 mg Intravenous BID (diuretic)   gabapentin 400 mg Oral HS   heparin (porcine) 5,000 Units Subcutaneous Q8H [address]   lactulose 30 g Oral Daily   nicotine 1 patch Transdermal Q24H   pantoprazole 40 mg Oral Daily   polyethylene glycol 17 g Oral BID     Continuous Infusions:   PRN Meds:  ALPRAZolam    HYDROmorphone    metoclopramide    oxyCODONE    oxyCODONE    sodium phosphate-biphosphate    SOCIAL HISTORY:   reports that he quit smoking about 2 years ago  He has never used smokeless tobacco  He reports that he uses drugs, including Marijuana  He reports that he does not drink alcohol  FAMILY HISTORY:  family history includes No Known Problems in his father and mother  ALLERGIES:  is allergic to zofran [ondansetron]  REVIEW OF SYSTEMS:  Please note that a 14-point review of systems was performed to include Constitutional, HEENT, Respiratory, CVS, GI, , Musculoskeletal, Integumentary, Neurologic, Rheumatologic, Endocrinologic, Psychiatric, Lymphatic, and Hematologic/Oncologic systems were reviewed and are negative unless otherwise stated in HPI  Positive and negative findings pertinent to this evaluation are incorporated into the history of present illness  PHYSICAL EXAMINATION:  Vital Signs: Temp:  [98 1 °F (36 7 °C)-98 2 °F (36 8 °C)] 98 2 °F (36 8 °C)  HR:  [100-113] 105  Resp:  [19-20] 20  BP: ()/(57-73) 100/72  Body mass index is 23 51 kg/m²  Body surface area is 2 18 meters squared  Constitutional: Alert and oriented     HEENT: Anicteric, PERRLA  Chest: decreased breathing sound bilaterally  CVS: Regularly regular rhythm  Abdomen: Soft, nontender, nondistended  Bowel sounds positive X 4  No palpable organomegaly  Extremities:  +++edema  Integumentary: No obvious rashes or bruises  Musculoskeletal: No obvious bony or joint deformities  Lymph Node Survey: No palpable preauricular, submandibular, cervical, supraclavicular, axillary, epitrochlear or inguinal lymphadenopathy      LABS:    Results from last 7 days  Lab Units 12/01/17 0444 11/30/17  0545 11/29/17  0731 11/28/17  1155 11/26/17  1112   WBC Thousand/uL 22 02* 20 99* 15 15* 12 72* 11 90*   HEMATOCRIT % 34 0* 32 1* 32 9* 35 2* 35 1*   PLATELETS Thousands/uL 353 333 364 410* 466*   NEUTROS PCT %  --   --   --  86*  --    MONOS PCT %  --   --   --  4  --    MONO PCT MAN %  --   --   --   --  1*       Results from last 7 days  Lab Units 12/01/17 0444 11/30/17  0545 11/29/17  0731   SODIUM mmol/L 136 135* 136   POTASSIUM mmol/L 4 7 4 8 4 6   CHLORIDE mmol/L 97* 96* 97*   CO2 mmol/L 26 25 28   BUN mg/dL 50* 49* 39*       Results from last 7 days  Lab Units 11/28/17  1155 11/26/17  1112   BILIRUBIN TOTAL mg/dL 1 50* 0 70   ALK PHOS U/L 695* 554*   ALT U/L 111* 114*   AST U/L 163* 135*       Results from last 7 days  Lab Units 11/28/17  1155   INR  1 27*   PTT seconds 63*       Final Result      XR chest 1 view portable   Final Result      Near complete opacification of the right hemithorax representing combination of mass, atelectasis, and pleural effusion  Workstation performed: [id number]             PROBLEM LIST:  Patient Active Problem List   Diagnosis    Adenocarcinoma of right lung, stage 4 (HCC)    Anxiety    Arthralgia    Pain of metastatic malignancy    Continuous opioid dependence ([address] )    Decrease in appetite    Depression    Insomnia    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)    Nausea    Neuropathic pain syndrome (non-herpetic)    Elevated rheumatoid factor    Under care of palliative care physician    Abdominal pain    Back pain    Chest pain    Bright red blood per rectum    Lung cancer ([address] )    Edema    Shortness of breath    Opiate dependence, continuous (HCC)       ASSESSMENT/PLAN:  Negra Ricardo is a 42 y o  male with:    1) lung adenocarcinoma : stage iv, high tumor burden  About to restart opdivo and now hospitalized; I have updated the primary oncology physician and physician assistant  Pt will be re-valuated after discharge regarding when to restart the treatment  2) supportive care per palliative care team     3) N/V : pt reported to me that zofran used to work well; however it is now labelled as " allergic to zofran " due to vomiting and no typical skin rash, bronchospasm like allergic reactions  It is not clear to me whether he is really allergic to zofran and we decided to try zofran once with 4 mg po    If tolerates well, pt can continue with zofran and hope this will better control his symptoms and help for nutrition status  4) ? On dexamethasone : consider lower dose for better control of lower ext edema       Jo Marsh MD  Oncology

## 2017-12-02 NOTE — RAPID RESPONSE
Progress Note - Rapid Response   Deidra Velasquez 42 y o  male MRN: [id number]    Time Called ( Time): 3470  Room#: 755  CLXVWMH Time ( Time): 2880  Event End Time ( Time): 0302  MEWS score at time of Rapid Response: 4  Primary reason for call: Other unwitnessed fall  Interventions:  Airway/Breathing:  CXR and No Intervention  Circulation: N/A  Other Treatments: N/A       Assessment:   1  Unwitnessed fall    Plan:   · Neuro checks q 4 hours, fall precautions already iniaited prior to fall, CT head and c spine, XR hips bilateral with XR sacrum and coccyx, up with assistance       HPI/Chief Complaint (Background/Situation):   Deidra Velasquez is a 42y o  year old male who presents with PMHx of stage IV adenoca of the lung, chronic opiate dependence, and +4 bilateral LE pitting edema had unwitnessed fall  Nurse was previously in the room with the patient when he left the room to attend to another patient  Pt was last seen sitting on the couch comfortably  Nurse stated that he heard the patient let out a moan of pain and found the patient lying on the floor on his stomach/right side  Pt denied LOC or hitting his head  States that he landed on his buttocks and notes pain in that area  Denies decreased muscle strength and numbness/tingling in LE bilaterally  Denies any other systemic sx at this time  As per nursing staff patient is at baseline mentation wise  Pt placed in c spine collar and transitioned to stretcher to go to CT via backboard      Historical Information   Past Medical History:   Diagnosis Date    Anxiety     Continuous opioid dependence ([facility] [address] )     GERD (gastroesophageal reflux disease)     Lung cancer (HCC)     B/L Lung CA stage 4     Past Surgical History:   Procedure Laterality Date    PORTACATH PLACEMENT       Social History   History   Alcohol Use No     History   Drug Use    Types: Marijuana     Comment: daily smoker     History   Smoking Status    Former Smoker    Quit date: 12/1/2015   Smokeless Tobacco    Never Used     Family History:   Family History   Problem Relation Age of Onset    No Known Problems Mother     No Known Problems Father        Meds/Allergies     acetaminophen 975 mg Oral Q8H   dexamethasone 4 mg Oral Daily   fentaNYL 100 mcg Transdermal Q72H   furosemide 40 mg Intravenous BID (diuretic)   gabapentin 400 mg Oral HS   heparin (porcine) 5,000 Units Subcutaneous Q8H [facility]   lactulose 30 g Oral Daily   nicotine 1 patch Transdermal Q24H   pantoprazole 40 mg Oral Daily   polyethylene glycol 17 g Oral BID            Allergies   Allergen Reactions    Zofran [Ondansetron] GI Intolerance       ROS: General ROS: positive for  - buttock pain bilaterally  Negative for: numbness/tingling, LOC, muscle weakness bilaterally, headache  All other ROS negative    Physical Exam:  Gen:A & O lying on the floor on his right side  HEENT:Head normocephalic atraumatic  Neck:No acute abnormality noted  Chest: No acute abnormality noted  Cor:No acute abnormality noted  Abd:No acute abnormality noted  Ext:+4 pitting edema LE bilaterally, full sensation LE bilaterally, muscle strength 5/5 LE bilaterally  No tenderness noted LE bilaterally  Neuro:A &O x 3  Skin:Skin integrity intact      Intake/Output Summary (Last 24 hours) at 12/02/17 0320  Last data filed at 12/01/17 2204   Gross per 24 hour   Intake              300 ml   Output              875 ml   Net             -575 ml       Respiratory    Lab Data (Last 4 hours)    None         O2/Vent Data (Last 4 hours)    None              Invasive Devices     Peripheral Intravenous Line            Peripheral IV 11/30/17 Left Antecubital 2 days                DIAGNOSTIC DATA:    Lab: I have personally reviewed pertinent lab results     CBC:     Results from last 7 days  Lab Units 12/01/17  0444   WBC Thousand/uL 22 02*   HEMOGLOBIN g/dL 10 5*   HEMATOCRIT % 34 0*   PLATELETS Thousands/uL 353     CMP:     Results from last 7 days  Lab Units 12/01/17  0444 11/30/17  0545 11/29/17  0731 11/28/17  1155 11/26/17  1112   SODIUM mmol/L 136 135* 136 138 140   POTASSIUM mmol/L 4 7 4 8 4 6 4 5 4 3   CHLORIDE mmol/L 97* 96* 97* 98* 99*   CO2 mmol/L 26 25 28 26 32   BUN mg/dL 50* 49* 39* 30* 20   CREATININE mg/dL 1 02 1 12 1 00 1 08 0 85   CALCIUM mg/dL 9 7 9 4 9 1 9 7 9 4   TOTAL PROTEIN g/dL  --   --   --  7 2 7 2   BILIRUBIN TOTAL mg/dL  --   --   --  1 50* 0 70   ALK PHOS U/L  --   --   --  695* 554*   ALT U/L  --   --   --  111* 114*   AST U/L  --   --   --  163* 135*   GLUCOSE RANDOM mg/dL 97 114 87 84 79     PT/INR:   No results found for: PT, INR,   Magnesium: No components found for: MAG,   Phosphorous: No results found for: PHOS    Microbiology:  Lab Results   Component Value Date    BLOODCX No Growth After 5 Days  10/01/2017    BLOODCX No Growth After 5 Days  04/28/2017    BLOODCX No Growth After 5 Days  04/28/2017         OUTCOME:   Other: CT scan then stayed in room  Family notified of transfer: no  Family member contacted: called spouse with no answer x 2  Code Status: Level 1 - Full Code  Critical Care Time: Total Critical Care time spent 20 minutes excluding procedures, teaching and family updates

## 2017-12-02 NOTE — SOCIAL WORK
Family requesting rolling walker  Rx obtained from Dr Max Jordan from [facility] notified  Will fax rx to [facility] to deliver to home along with the hospital bed

## 2017-12-02 NOTE — PLAN OF CARE
Problem: DISCHARGE PLANNING - CARE MANAGEMENT  Goal: Discharge to post-acute care or home with appropriate resources  INTERVENTIONS:  - Conduct assessment to determine patient/family and health care team treatment goals, and need for post-acute services based on payer coverage, community resources, and patient preferences, and barriers to discharge  - Address psychosocial, clinical, and financial barriers to discharge as identified in assessment in conjunction with the patient/family and health care team  - Arrange appropriate level of post-acute services according to patients   needs and preference and payer coverage in collaboration with the physician and health care team  - Communicate with and update the patient/family, physician, and health care team regarding progress on the discharge plan  - Arrange appropriate transportation to post-acute venues   Outcome: Progressing  Family requesting rolling walker  Rx obtained from Dr Severo Teague from [facility] notified  Will fax rx to [facility] to deliver to home along with the hospital bed

## 2017-12-02 NOTE — PROGRESS NOTES
Spoke with Wife, Srinivasan Stock, and made aware that we are moving the patient to room 208 once the room is ready  Wife understands

## 2017-12-02 NOTE — PROGRESS NOTES
Patient continues to remain uncooperative and noncompliant  Patient re-educated about sitting on the edge of the bed  "Call, Don't fall" agreement reviewed with patient  Patient alert and oriented  X4  Will continue to monitor  12/2/2017 0015  ========================================      Patient educated about sitting on the edge of the bed  Patient states "Hmm"  Patient alert and oriented  Patient noncompliant  Bed alarm in place  Will continue to monitor

## 2017-12-02 NOTE — PROGRESS NOTES
Progress Note - Lazara Wallace 42 y o  male MRN: [id number]  Unit/Bed#: -01 Encounter: [id number]    Subjective:   Leisa Lee is a bit more talkative today but he is still quite a difficult historian  He rates his pain at a 10-12  He has not had any p r n  narcotic since 10:00 p m  last night  Yesterday, he received 3 doses of oxycodone for a total of 50 mg and 2 doses of intravenous Dilaudid for a total of 2 mg  Duragesic was increased from  mcg at noon yesterday  He is unable to tell me if there is any change in his pain  He does state that he is anxious to get home and get started on cancer treatment  He states that he never had cancer treatment in the past   I reminded him that he had been on Opti Vo according to the notes  His bowels are moving, he feels ready to have a bowel movement at this time  Nausea has improved  All other ROS are negative  Objective:   Vitals: Blood pressure 130/85, pulse (!) 106, temperature 97 9 °F (36 6 °C), temperature source Oral, resp  rate 18, height 6' 4" (1 93 m), weight 87 6 kg (193 lb 2 oz), SpO2 96 %  ,Body mass index is 23 51 kg/m²  SPO2 RA Rest    Flowsheet Row ED to Hosp-Admission (Current) from 11/28/2017 in [address] [place] [address] 2nd Floor Med Surg Unit   SpO2  96 %   SpO2 Activity  At Rest   O2 Device  None (Room air)   O2 Flow Rate  2 L/min        I&O:   Intake/Output Summary (Last 24 hours) at 12/02/17 0892  Last data filed at 12/02/17 0212   Gross per 24 hour   Intake              300 ml   Output             1225 ml   Net             -925 ml         Physical Exam:       General Appearance:    Fairly alert, more interactive, still resting elbows on knees for most of the conversation but he does raise his head make eye contact periodically                         Lungs:     Diminished breath sounds on the right, respirations unlabored   Heart:    Regular rate and rhythm, S1 and S2 normal, no murmur, rub   or gallop       Extremities:  Bilateral 3+ pitting edema           Invasive Devices     Peripheral Intravenous Line            Peripheral IV 11/30/17 Left Antecubital 2 days                      Social History  reviewed  Family History   Problem Relation Age of Onset    No Known Problems Mother     No Known Problems Father     reviewed    Meds:  Current Facility-Administered Medications   Medication Dose Route Frequency Provider Last Rate Last Dose    acetaminophen (TYLENOL) tablet 975 mg  975 mg Oral Q8H Mel Ramos PA-C   975 mg at 12/02/17 0240    ALPRAZolam Abad Acevedo) tablet 0 5 mg  0 5 mg Oral TID PRN Cole Garduno MD   0 5 mg at 11/29/17 1812    dexamethasone (DECADRON) tablet 4 mg  4 mg Oral Daily Cole Garduno MD   4 mg at 12/02/17 0818    fentaNYL (DURAGESIC) 100 mcg/hr TD 72 hr patch 100 mcg  100 mcg Transdermal Q72H Cole Garduno MD   100 mcg at 12/01/17 1208    furosemide (LASIX) injection 40 mg  40 mg Intravenous BID (diuretic) Cole Garduno MD   40 mg at 12/02/17 0818    gabapentin (NEURONTIN) oral solution 400 mg  400 mg Oral HS Cole Garduno MD        heparin (porcine) subcutaneous injection 5,000 Units  5,000 Units Subcutaneous [facility] Heike Shanks PA-C   5,000 Units at 12/01/17 2237    HYDROmorphone (DILAUDID) 1 mg/mL injection 1 mg  1 mg Intravenous Q3H PRN Cole Garduno MD   1 mg at 12/01/17 1603    lactulose 20 g/30 mL oral solution 30 g  30 g Oral Daily Chris Ramos PA-C   30 g at 12/02/17 0817    metoclopramide (REGLAN) injection 10 mg  10 mg Intravenous Q6H PRN Cole Garduno MD   10 mg at 11/30/17 1039    nicotine (NICODERM CQ) 21 mg/24 hr TD 24 hr patch 1 patch  1 patch Transdermal Q24H Heike Shanks PA-C   1 patch at 12/01/17 1642    oxyCODONE (ROXICODONE) immediate release tablet 10 mg  10 mg Oral Q4H PRN Heike Sahnks PA-C   10 mg at 12/01/17 0915    oxyCODONE (ROXICODONE) immediate release tablet 20 mg  20 mg Oral Q4H PRN Heike Shanks PA-C   20 mg at 12/02/17 0818    pantoprazole (PROTONIX) EC tablet 40 mg 40 mg Oral Daily Nathaly Arredondo PA-C   40 mg at 12/02/17 0818    polyethylene glycol (MIRALAX) packet 17 g  17 g Oral BID Fabien Snider MD   17 g at 12/02/17 0818    sodium phosphate-biphosphate (FLEET) enema 1 enema  1 enema Rectal Daily PRN Nathaly Arredondo PA-C          Prescriptions Prior to Admission   Medication    acetaminophen (TYLENOL) 500 mg tablet    dexamethasone (DECADRON) 4 mg tablet    diazepam (VALIUM) 10 mg tablet    diazepam (VALIUM) 5 mg tablet    fentaNYL (DURAGESIC) 75 mcg/hr    furosemide (LASIX) 40 mg tablet    gabapentin (NEURONTIN) 400 mg capsule    glycerin, adult, (glycerin adult) 2 g rectal suppository    lactulose 20 g/30 mL    mirtazapine (REMERON) 15 mg tablet    nicotine (NICODERM CQ) 21 mg/24 hr TD 24 hr patch    OXYCODONE HCL PO    OXYCODONE HCL PO    pantoprazole (PROTONIX) 40 mg tablet       Labs:    Results from last 7 days  Lab Units 12/02/17 0539 12/01/17 0444 11/30/17 0545  11/28/17  1155 11/26/17  1112   WBC Thousand/uL 21 13* 22 02* 20 99*  < > 12 72* 11 90*   HEMOGLOBIN g/dL 10 1* 10 5* 10 1*  < > 10 8* 10 8*   HEMATOCRIT % 32 3* 34 0* 32 1*  < > 35 2* 35 1*   PLATELETS Thousands/uL 299 353 333  < > 410* 466*   NEUTROS PCT %  --   --   --   --  86*  --    LYMPHS PCT %  --   --   --   --  10*  --    LYMPHO PCT %  --   --   --   --   --  18   MONOS PCT %  --   --   --   --  4  --    MONO PCT MAN %  --   --   --   --   --  1*   EOS PCT %  --   --   --   --  0  --    < > = values in this interval not displayed      Results from last 7 days  Lab Units 12/02/17 0539 12/01/17 0444 11/30/17 0545  11/28/17  1155 11/26/17  1112   SODIUM mmol/L 137 136 135*  < > 138 140   POTASSIUM mmol/L 4 7 4 7 4 8  < > 4 5 4 3   CHLORIDE mmol/L 98* 97* 96*  < > 98* 99*   CO2 mmol/L 27 26 25  < > 26 32   BUN mg/dL 47* 50* 49*  < > 30* 20   CREATININE mg/dL 0 96 1 02 1 12  < > 1 08 0 85   CALCIUM mg/dL 9 7 9 7 9 4  < > 9 7 9 4   TOTAL PROTEIN g/dL  --   --   --   --  7 2 7 2 BILIRUBIN TOTAL mg/dL  --   --   --   --  1 50* 0 70   ALK PHOS U/L  --   --   --   --  695* 554*   ALT U/L  --   --   --   --  111* 114*   AST U/L  --   --   --   --  163* 135*   GLUCOSE RANDOM mg/dL 97 97 114  < > 84 79   < > = values in this interval not displayed  Lab Results   Component Value Date    TROPONINI <0 02 09/16/2015    TROPONINI <0 02 09/16/2015    TROPONINI <0 02 09/15/2015    CKTOTAL 94 09/15/2015       Results from last 7 days  Lab Units 11/28/17  1155   INR  1 27*     Lab Results   Component Value Date    BLOODCX No Growth After 5 Days  10/01/2017    BLOODCX No Growth After 5 Days  04/28/2017    BLOODCX No Growth After 5 Days  04/28/2017       Imaging:  Results for orders placed during the hospital encounter of 11/28/17   XR chest 1 view portable    Narrative CHEST      INDICATION: Shortness of breath  Lung carcinoma  COMPARISON:  CT 11/26/2017  VIEWS:   AP frontal; 1 image    FINDINGS:    Right chest wall port catheter is present  The cardiomediastinal silhouette is stable  Near complete opacification of the right hemithorax is noted consistent with combination of mass, atelectasis, and pleural effusion  Osseous structures are age appropriate  Impression Near complete opacification of the right hemithorax representing combination of mass, atelectasis, and pleural effusion  Workstation performed: [id number]       Results for orders placed during the hospital encounter of 10/26/17   XR chest 2 views    Narrative CHEST - DUAL ENERGY    INDICATION:  Chest pain    COMPARISON:  October 1, 2017    VIEWS:  PA (including soft tissue/bone algorithms) and lateral projections    IMAGES:  4    FINDINGS:  Right-sided line is seen with tip in the right atrium    There is a mass in the right lung which abuts the mediastinal border  This is unchanged from the previous study  No new consolidation  Visualized osseous structures appear within normal limits for the patient's age  Impression No new consolidation seen  No congestion seen  Right lung mass, unchanged from the previous study of October 1, 2017      Workstation performed: [id number]         VTE Pharmacologic Prophylaxis: Heparin      Code Status:   Level 1 - Full Code    Assessment:  Principal Problem:    Edema  Active Problems:    Lung cancer ([facility] [address] )    Shortness of breath    Opiate dependence, continuous ([facility] [address] )  Resolved Problems:    * No resolved hospital problems  *      Plan:  · Stage IV adenocarcinoma of the lung-patient has received more than 100 oral morphine equivalence in addition to the increased Duragesic from  mcg  Blood pressures have been on the low side  I recommend discontinuing the IV Dilaudid, continue with oxycodone 10-20 mg every 4 hours as needed, consider increasing Duragesic to 125 mcg  I agree with decreased dose of Decadron  Neurontin was discontinued and then resumed and refused by patient, I will discuss further with primary team   Case was discussed at length yesterday with Dr Jenniffer Leo  Patient's compliance with oncology follow-up has been a major barrier to his care  Patient encouraged to make outpatient follow-up with Oncology in keeping with his goals of care to fight this cancer        Queta Luo MD  12/2/2017,8:28 AM

## 2017-12-02 NOTE — PLAN OF CARE
Problem: Prexisting or High Potential for Compromised Skin Integrity  Goal: Skin integrity is maintained or improved  INTERVENTIONS:  - Identify patients at risk for skin breakdown  - Assess and monitor skin integrity  - Assess and monitor nutrition and hydration status  - Monitor labs (i e  albumin)  - Assess for incontinence   - Turn and reposition patient  - Assist with mobility/ambulation  - Relieve pressure over bony prominences  - Avoid friction and shearing  - Provide appropriate hygiene as needed including keeping skin clean and dry  - Evaluate need for skin moisturizer/barrier cream  - Collaborate with interdisciplinary team (i e  Nutrition, Rehabilitation, etc )   - Patient/family teaching   Outcome: Progressing      Problem: Potential for Falls  Goal: Patient will remain free of falls  INTERVENTIONS:  - Assess patient frequently for physical needs  -  Identify cognitive and physical deficits and behaviors that affect risk of falls  -  Bertrand fall precautions as indicated by assessment   - Educate patient/family on patient safety including physical limitations  - Instruct patient to call for assistance with activity based on assessment  - Modify environment to reduce risk of injury  - Consider OT/PT consult to assist with strengthening/mobility   Outcome: Progressing      Problem: Nutrition/Hydration-ADULT  Goal: Nutrient/Hydration intake appropriate for improving, restoring or maintaining nutritional needs  Monitor and assess patient's nutrition/hydration status for malnutrition (ex- brittle hair, bruises, dry skin, pale skin and conjunctiva, muscle wasting, smooth red tongue, and disorientation)  Collaborate with interdisciplinary team and initiate plan and interventions as ordered  Monitor patient's weight and dietary intake as ordered or per policy  Utilize nutrition screening tool and intervene per policy   Determine patient's food preferences and provide high-protein, high-caloric foods as appropriate       INTERVENTIONS:  - Monitor oral intake, urinary output, labs, and treatment plans  - Assess nutrition and hydration status and recommend course of action  - Evaluate amount of meals eaten  - Assist patient with eating if necessary   - Allow adequate time for meals  - Recommend/ encourage appropriate diets, oral nutritional supplements, and vitamin/mineral supplements  - Order, calculate, and assess calorie counts as needed  - Recommend, monitor, and adjust tube feedings and TPN/PPN based on assessed needs  - Assess need for intravenous fluids  - Provide specific nutrition/hydration education as appropriate  - Include patient/family/caregiver in decisions related to nutrition   Outcome: Progressing      Problem: PAIN - ADULT  Goal: Verbalizes/displays adequate comfort level or baseline comfort level  Interventions:  - Encourage patient to monitor pain and request assistance  - Assess pain using appropriate pain scale  - Administer analgesics based on type and severity of pain and evaluate response  - Implement non-pharmacological measures as appropriate and evaluate response  - Consider cultural and social influences on pain and pain management  - Notify physician/advanced practitioner if interventions unsuccessful or patient reports new pain   Outcome: Progressing      Problem: SAFETY ADULT  Goal: Maintain or return to baseline ADL function  INTERVENTIONS:  -  Assess patient's ability to carry out ADLs; assess patient's baseline for ADL function and identify physical deficits which impact ability to perform ADLs (bathing, care of mouth/teeth, toileting, grooming, dressing, etc )  - Assess/evaluate cause of self-care deficits   - Assess range of motion  - Assess patient's mobility; develop plan if impaired  - Assess patient's need for assistive devices and provide as appropriate  - Encourage maximum independence but intervene and supervise when necessary  ¯ Involve family in performance of ADLs  ¯ Assess for home care needs following discharge   ¯ Request OT consult to assist with ADL evaluation and planning for discharge  ¯ Provide patient education as appropriate   Outcome: Progressing    Goal: Maintain or return mobility status to optimal level  INTERVENTIONS:  - Assess patient's baseline mobility status (ambulation, transfers, stairs, etc )    - Identify cognitive and physical deficits and behaviors that affect mobility  - Identify mobility aids required to assist with transfers and/or ambulation (gait belt, sit-to-stand, lift, walker, cane, etc )  - Center fall precautions as indicated by assessment  - Record patient progress and toleration of activity level on Mobility SBAR; progress patient to next Phase/Stage  - Instruct patient to call for assistance with activity based on assessment  - Request Rehabilitation consult to assist with strengthening/weightbearing, etc    Outcome: Progressing      Problem: DISCHARGE PLANNING  Goal: Discharge to home or other facility with appropriate resources  INTERVENTIONS:  - Identify barriers to discharge w/patient and caregiver  - Arrange for needed discharge resources and transportation as appropriate  - Identify discharge learning needs (meds, wound care, etc )  - Arrange for interpretive services to assist at discharge as needed  - Refer to Case Management Department for coordinating discharge planning if the patient needs post-hospital services based on physician/advanced practitioner order or complex needs related to functional status, cognitive ability, or social support system   Outcome: Progressing      Problem: Knowledge Deficit  Goal: Patient/family/caregiver demonstrates understanding of disease process, treatment plan, medications, and discharge instructions  Complete learning assessment and assess knowledge base    Interventions:  - Provide teaching at level of understanding  - Provide teaching via preferred learning methods   Outcome: Progressing      Problem: METABOLIC, FLUID AND ELECTROLYTES - ADULT  Goal: Electrolytes maintained within normal limits  INTERVENTIONS:  - Monitor labs and assess patient for signs and symptoms of electrolyte imbalances  - Administer electrolyte replacement as ordered  - Monitor response to electrolyte replacements, including repeat lab results as appropriate  - Instruct patient on fluid and nutrition as appropriate   Outcome: Progressing    Goal: Fluid balance maintained  INTERVENTIONS:  - Monitor labs and assess for signs and symptoms of volume excess or deficit  - Monitor I/O and WT  - Instruct patient on fluid and nutrition as appropriate   Outcome: Progressing      Problem: SKIN/TISSUE INTEGRITY - ADULT  Goal: Skin integrity remains intact  INTERVENTIONS  - Identify patients at risk for skin breakdown  - Assess and monitor skin integrity  - Assess and monitor nutrition and hydration status  - Monitor labs (i e  albumin)  - Assess for incontinence   - Turn and reposition patient  - Assist with mobility/ambulation  - Relieve pressure over bony prominences  - Avoid friction and shearing  - Provide appropriate hygiene as needed including keeping skin clean and dry  - Evaluate need for skin moisturizer/barrier cream  - Collaborate with interdisciplinary team (i e  Nutrition, Rehabilitation, etc )   - Patient/family teaching   Outcome: Progressing      Problem: HEMATOLOGIC - ADULT  Goal: Maintains hematologic stability  INTERVENTIONS  - Assess for signs and symptoms of bleeding or hemorrhage  - Monitor labs  - Administer supportive blood products/factors as ordered and appropriate   Outcome: Progressing      Problem: DISCHARGE PLANNING - CARE MANAGEMENT  Goal: Discharge to post-acute care or home with appropriate resources  INTERVENTIONS:  - Conduct assessment to determine patient/family and health care team treatment goals, and need for post-acute services based on payer coverage, community resources, and patient preferences, and barriers to discharge  - Address psychosocial, clinical, and financial barriers to discharge as identified in assessment in conjunction with the patient/family and health care team  - Arrange appropriate level of post-acute services according to patients   needs and preference and payer coverage in collaboration with the physician and health care team  - Communicate with and update the patient/family, physician, and health care team regarding progress on the discharge plan  - Arrange appropriate transportation to post-acute venues   Outcome: Progressing

## 2017-12-02 NOTE — PROGRESS NOTES
[facility] Progress Note  Patient: Sallie Puentes 42 y o  male   MRN: [id number]  PCP: No primary care provider on file  Unit/Bed#: [facility] Encounter: [id number]  Date Of Visit: 12/02/17    Assessment/Plan:    Principal Problem:    Edema  Active Problems:    Lung cancer (HCC)    Shortness of breath    Opiate dependence, continuous ([address] )    Present on Admission:   Lung cancer (HCC)   Edema   Shortness of breath   Opiate dependence, continuous (HCC)      · Bilateral lower extremities edema  Patient is again up in the chair  He had a fall earlier  Although he is quite  lethargic, he is complaining of pain  Continue with IV diuretics  Again encouraged on keeping his legs elevated  He is significantly Mal-nourished  · Status post unwitnessed fall  X-rays unremarkable  Patient does not ambulate a whole lot  Continue with PT/OT  · Metastatic stage IV lung cancer  Follow up with Oncology on discharge  He has not been really compliant with his treatments since diagnosis  Patient resume treatment on outpatient basis again as I reviewed the oncology consult  Palliative care team on board as well  · Anxiety-continue E p r n  Ativan  · Nausea-continue with current treatment-antiemetics  · Obstipation secondary to narcotics  On bowel regimen  · Leukocytosis-on steroids  Continue to monitor  · Anemia of chronic disease  Hemoglobin stable  Has low MCV      VTE Pharmacologic Prophylaxis:   Pharmacologic: Enoxaparin (Lovenox)  Mechanical VTE Prophylaxis in Place: Yes    Patient Centered Rounds: I have performed bedside rounds with nursing staff today      Discussions with Specialists or Other Care Team Provider:  Yes    Education and Discussions with Family / Patient:  Yes    Current Length of Stay: 4 day(s)    Current Patient Status: Inpatient   Certification Statement: The patient will continue to require additional inpatient hospital stay due to Leg edema/fall/ambulatory dysfunction/lung cancer    Discharge Plan:  Short-term rehab versus home with home health services    Code Status: Level 1 - Full Code      Subjective:   Events noted  Patient had a fall which was unwitnessed  Although he is quite lethargic and up in the chair  He is complaining of pain although he could not tell me where exactly  He denies any abdominal pain to me  Objective:     Vitals:   Temp (24hrs), Av °F (36 7 °C), Min:97 9 °F (36 6 °C), Max:98 2 °F (36 8 °C)    HR:  [102-106] 106  Resp:  [18-20] 18  BP: (100-130)/(62-85) 130/85  SpO2:  [91 %-96 %] 96 %  Body mass index is 23 51 kg/m²  Input and Output Summary (last 24 hours): Intake/Output Summary (Last 24 hours) at 17 [id number]  Last data filed at 17 7818   Gross per 24 hour   Intake              300 ml   Output             1225 ml   Net             -925 ml           Physical Exam:     Vital signs are reviewed as above  Constitutional:   42years old male who looks much older than his age  He is up in the chair and lethargic    Eyes:  Conjunctivae are pale   HENT:  Lips are pretty dry   Neck: Neck is supple  Cardiac: I did not hear any rubs or gallop  Patient appears to be in sinus rhythm  Respiratory: Patient not in significant respiratory distress  Air entry in general decreased especially at the base  GI: Abdomen is soft  It is grossly nontender  Neurologic:  Patient is quite lethargic  Psychiatric:  Appears depressed to   Musculoskeletal   He is moving his arms    He has a very easily visible muscle tendons on his chest/arms and upper back with wasting of muscles    Extremities:  He has 4+ bilateral lower extremities edema     Additional Data:     Labs:      Results from last 7 days  Lab Units 17  0539  17  1155   WBC Thousand/uL 21 13*  < > 12 72*   HEMOGLOBIN g/dL 10 1*  < > 10 8*   HEMATOCRIT % 32 3*  < > 35 2*   PLATELETS Thousands/uL 299  < > 410*   NEUTROS PCT %  --   --  86*   LYMPHS PCT %  --   --  10*   MONOS PCT % --   --  4   EOS PCT %  --   --  0   < > = values in this interval not displayed  Results from last 7 days  Lab Units 12/02/17  0539  11/28/17  1155   SODIUM mmol/L 137  < > 138   POTASSIUM mmol/L 4 7  < > 4 5   CHLORIDE mmol/L 98*  < > 98*   CO2 mmol/L 27  < > 26   BUN mg/dL 47*  < > 30*   CREATININE mg/dL 0 96  < > 1 08   CALCIUM mg/dL 9 7  < > 9 7   TOTAL PROTEIN g/dL  --   --  7 2   BILIRUBIN TOTAL mg/dL  --   --  1 50*   ALK PHOS U/L  --   --  695*   ALT U/L  --   --  111*   AST U/L  --   --  163*   GLUCOSE RANDOM mg/dL 97  < > 84   < > = values in this interval not displayed  Results from last 7 days  Lab Units 11/28/17  1155   INR  1 27*       * I Have Reviewed All Lab Data Listed Above  * Additional Pertinent Lab Tests Reviewed: All Labs Within Last 24 Hours Reviewed      Recent Cultures (last 7 days):           Last 24 Hours Medication List:     acetaminophen 975 mg Oral Q8H   dexamethasone 4 mg Oral Daily   fentaNYL 100 mcg Transdermal Q72H   furosemide 40 mg Intravenous BID (diuretic)   gabapentin 400 mg Oral HS   heparin (porcine) 5,000 Units Subcutaneous Q8H [address]   lactulose 30 g Oral Daily   nicotine 1 patch Transdermal Q24H   pantoprazole 40 mg Oral Daily   polyethylene glycol 17 g Oral BID        Today, Patient Was Seen By: Eugenia Mcclain MD    ** Please Note: Dragon 360 Dictation voice to text software may have been used in the creation of this document   **

## 2017-12-02 NOTE — PROGRESS NOTES
Rapid response called on patient for an unwitnessed fall  Nurse states that the patient was sitting on the couch when he left the room for another patient when he heard the patient call out in pain  Noted the patient to be lying on the floor on his stomach/right side  Pt is alert and oriented at his baseline  Denies LOC or hitting his head  States that he landed on his buttocks  Admits to buttock pain at this time  Pt placed in a c spine collar with backboard-placed on a stretcher and taken to CT scan  Ordered CT head and c spine and XR hips bilaterally with sacrum and coccyx  Pt denies any other systemic sx at this time  Fall precautions were already in place at the time of fall  Patient is extremely noncompliant with treatment and refuses to listen to instruction even though he is alert and oriented x 4  Educated extensively by hospital supervisor regarding safety

## 2017-12-03 LAB
ANION GAP SERPL CALCULATED.3IONS-SCNC: 10 MMOL/L (ref 4–13)
BUN SERPL-MCNC: 52 MG/DL (ref 5–25)
CALCIUM SERPL-MCNC: 9.3 MG/DL (ref 8.3–10.1)
CHLORIDE SERPL-SCNC: 98 MMOL/L (ref 100–108)
CO2 SERPL-SCNC: 28 MMOL/L (ref 21–32)
CREAT SERPL-MCNC: 0.99 MG/DL (ref 0.6–1.3)
ERYTHROCYTE [DISTWIDTH] IN BLOOD BY AUTOMATED COUNT: 22.3 % (ref 11.6–15.1)
GFR SERPL CREATININE-BSD FRML MDRD: 110 ML/MIN/1.73SQ M
GLUCOSE SERPL-MCNC: 97 MG/DL (ref 65–140)
HCT VFR BLD AUTO: 32.5 % (ref 36.5–49.3)
HGB BLD-MCNC: 10.2 G/DL (ref 12–17)
MCH RBC QN AUTO: 18.5 PG (ref 26.8–34.3)
MCHC RBC AUTO-ENTMCNC: 31.4 G/DL (ref 31.4–37.4)
MCV RBC AUTO: 59 FL (ref 82–98)
PLATELET # BLD AUTO: 282 THOUSANDS/UL (ref 149–390)
PMV BLD AUTO: 9.4 FL (ref 8.9–12.7)
POTASSIUM SERPL-SCNC: 4.6 MMOL/L (ref 3.5–5.3)
RBC # BLD AUTO: 5.51 MILLION/UL (ref 3.88–5.62)
SODIUM SERPL-SCNC: 136 MMOL/L (ref 136–145)
WBC # BLD AUTO: 15.73 THOUSAND/UL (ref 4.31–10.16)

## 2017-12-03 PROCEDURE — 80048 BASIC METABOLIC PNL TOTAL CA: CPT | Performed by: INTERNAL MEDICINE

## 2017-12-03 PROCEDURE — 85027 COMPLETE CBC AUTOMATED: CPT | Performed by: INTERNAL MEDICINE

## 2017-12-03 RX ADMIN — DEXAMETHASONE 4 MG: 4 TABLET ORAL at 10:41

## 2017-12-03 RX ADMIN — HYDROMORPHONE HYDROCHLORIDE 1 MG: 1 INJECTION, SOLUTION INTRAMUSCULAR; INTRAVENOUS; SUBCUTANEOUS at 08:33

## 2017-12-03 RX ADMIN — NICOTINE 1 PATCH: 21 PATCH, EXTENDED RELEASE TRANSDERMAL at 17:12

## 2017-12-03 RX ADMIN — Medication 10 ML: at 02:37

## 2017-12-03 RX ADMIN — GABAPENTIN 400 MG: 250 SOLUTION ORAL at 22:27

## 2017-12-03 RX ADMIN — POLYETHYLENE GLYCOL 3350 17 G: 17 POWDER, FOR SOLUTION ORAL at 10:41

## 2017-12-03 RX ADMIN — HYDROMORPHONE HYDROCHLORIDE 1 MG: 1 INJECTION, SOLUTION INTRAMUSCULAR; INTRAVENOUS; SUBCUTANEOUS at 13:17

## 2017-12-03 RX ADMIN — HYDROMORPHONE HYDROCHLORIDE 1 MG: 1 INJECTION, SOLUTION INTRAMUSCULAR; INTRAVENOUS; SUBCUTANEOUS at 18:07

## 2017-12-03 RX ADMIN — HYDROMORPHONE HYDROCHLORIDE 1 MG: 1 INJECTION, SOLUTION INTRAMUSCULAR; INTRAVENOUS; SUBCUTANEOUS at 14:53

## 2017-12-03 RX ADMIN — METHYLNALTREXONE BROMIDE 12 MG: 12 INJECTION, SOLUTION SUBCUTANEOUS at 13:16

## 2017-12-03 RX ADMIN — HYDROMORPHONE HYDROCHLORIDE 1 MG: 1 INJECTION, SOLUTION INTRAMUSCULAR; INTRAVENOUS; SUBCUTANEOUS at 21:06

## 2017-12-03 RX ADMIN — HEPARIN SODIUM 5000 UNITS: 5000 INJECTION, SOLUTION INTRAVENOUS; SUBCUTANEOUS at 14:27

## 2017-12-03 RX ADMIN — HEPARIN SODIUM 5000 UNITS: 5000 INJECTION, SOLUTION INTRAVENOUS; SUBCUTANEOUS at 05:23

## 2017-12-03 RX ADMIN — ACETAMINOPHEN 975 MG: 325 TABLET ORAL at 10:40

## 2017-12-03 RX ADMIN — PANTOPRAZOLE SODIUM 40 MG: 40 TABLET, DELAYED RELEASE ORAL at 10:41

## 2017-12-03 RX ADMIN — OXYCODONE HYDROCHLORIDE 20 MG: 10 TABLET ORAL at 08:16

## 2017-12-03 RX ADMIN — HEPARIN SODIUM 5000 UNITS: 5000 INJECTION, SOLUTION INTRAVENOUS; SUBCUTANEOUS at 22:22

## 2017-12-03 RX ADMIN — ACETAMINOPHEN 975 MG: 325 TABLET ORAL at 02:36

## 2017-12-03 RX ADMIN — ALPRAZOLAM 0.5 MG: 0.5 TABLET ORAL at 10:45

## 2017-12-03 RX ADMIN — OXYCODONE HYDROCHLORIDE 10 MG: 10 TABLET ORAL at 16:14

## 2017-12-03 RX ADMIN — OXYCODONE HYDROCHLORIDE 10 MG: 10 TABLET ORAL at 10:41

## 2017-12-03 RX ADMIN — METOCLOPRAMIDE 10 MG: 5 INJECTION, SOLUTION INTRAMUSCULAR; INTRAVENOUS at 14:27

## 2017-12-03 RX ADMIN — LACTULOSE 30 G: 20 SOLUTION ORAL at 10:41

## 2017-12-03 NOTE — PROGRESS NOTES
Placed in nonviolent restraints at this time to prevent fall as patient is unable to adhere to direction to avoid injury and is continuously hanging off of his bed

## 2017-12-03 NOTE — PROGRESS NOTES
[facility] Progress Note  Patient: Brent Waller 42 y o  male   MRN: [id number]  PCP: No primary care provider on file  Unit/Bed#: [facility] Encounter: [id number]  Date Of Visit: 17    Assessment:    Principal Problem:    Edema  Active Problems:    Lung cancer (HCC)    Shortness of breath    Opiate dependence, continuous (HCC)      Plan:    · Bilateral lower extremity edema: This is likely secondary to his cancer  He is still complaining of pain  We did increase the patient's fentanyl  To 100 mcg  Palliative Care is following the patient  Patient does not have stockings on this time  They are trying to order the size for the patient  He needs extra large  · Continuous opiate dependence:  Continue with current regimen  · Nausea:  Continue with Zofran  · stage IV metastatic lung cancer:  Patient will need to follow up with his primary oncologist as an outpatient  · Anemia chronic disease  Hemoglobin is stable  · Leukocytosis: This is likely secondary to the steroids  · Anxiety:  Continue with Ativan as needed  ·       VTE Pharmacologic Prophylaxis:   Pharmacologic: Enoxaparin (Lovenox)  Mechanical VTE Prophylaxis in Place: Yes    Patient Centered Rounds: I have performed bedside rounds with nursing staff today  Education and Discussions with Family / Patient:  Discussed with the with    Time Spent for Care: 20 minutes  More than 50% of total time spent on counseling and coordination of care as described above  Current Length of Stay: 5 day(s)    Current Patient Status: Inpatient   Certification Statement: The patient will continue to require additional inpatient hospital stay due to Having intractable pain    Discharge Plan:  Hopeful discharge in the next 1-2 days    Code Status: Level 1 - Full Code      Subjective:   Patient seen and examined  Still complains of pain    Still has difficulty with bowel movement    Objective:     Vitals:   Temp (24hrs), Av 1 °F (36 7 °C), Min:98 °F (36 7 °C), Max:98 1 °F (36 7 °C)    HR:  [109-114] 109  Resp:  [19] 19  BP: (100-115)/(68-77) 100/68  SpO2:  [91 %-95 %] 95 %  Body mass index is 23 51 kg/m²  Input and Output Summary (last 24 hours): Intake/Output Summary (Last 24 hours) at 12/03/17 1031  Last data filed at 12/02/17 2201   Gross per 24 hour   Intake              120 ml   Output              650 ml   Net             -530 ml       Physical Exam:     General Appearance:    Alert, cooperative, no distress, appears stated age                               Lungs:     Clear to auscultation bilaterally, respirations unlabored       Heart:    Regular rate and rhythm, S1 and S2 normal, no murmur, rub    or gallop   Abdomen:     Soft, non-tender, bowel sounds active all four quadrants,     no masses, no organomegaly           Extremities:   Extremities normal, atraumatic, no cyanosis or edema                       Additional Data:     Labs:      Results from last 7 days  Lab Units 12/03/17  0601  11/28/17  1155   WBC Thousand/uL 15 73*  < > 12 72*   HEMOGLOBIN g/dL 10 2*  < > 10 8*   HEMATOCRIT % 32 5*  < > 35 2*   PLATELETS Thousands/uL 282  < > 410*   NEUTROS PCT %  --   --  86*   LYMPHS PCT %  --   --  10*   MONOS PCT %  --   --  4   EOS PCT %  --   --  0   < > = values in this interval not displayed  Results from last 7 days  Lab Units 12/03/17  0601  11/28/17  1155   SODIUM mmol/L 136  < > 138   POTASSIUM mmol/L 4 6  < > 4 5   CHLORIDE mmol/L 98*  < > 98*   CO2 mmol/L 28  < > 26   BUN mg/dL 52*  < > 30*   CREATININE mg/dL 0 99  < > 1 08   CALCIUM mg/dL 9 3  < > 9 7   TOTAL PROTEIN g/dL  --   --  7 2   BILIRUBIN TOTAL mg/dL  --   --  1 50*   ALK PHOS U/L  --   --  695*   ALT U/L  --   --  111*   AST U/L  --   --  163*   GLUCOSE RANDOM mg/dL 97  < > 84   < > = values in this interval not displayed  Results from last 7 days  Lab Units 11/28/17  1155   INR  1 27*       * I Have Reviewed All Lab Data Listed Above    * Additional Pertinent Lab Tests Reviewed: [facility]    *    Recent Cultures (last 7 days):           Last 24 Hours Medication List:     acetaminophen 975 mg Oral Q8H   dexamethasone 4 mg Oral Daily   fentaNYL 100 mcg Transdermal Q72H   furosemide 40 mg Intravenous BID (diuretic)   gabapentin 400 mg Oral HS   heparin (porcine) 5,000 Units Subcutaneous Q8H [address]   lactulose 30 g Oral Daily   nicotine 1 patch Transdermal Q24H   pantoprazole 40 mg Oral Daily   polyethylene glycol 17 g Oral BID        Today, Patient Was Seen By: Min Blankenship MD    ** Please Note: Dragon 360 Dictation voice to text software may have been used in the creation of this document   **

## 2017-12-03 NOTE — PROGRESS NOTES
Patient restrained in bed by lap belt and mitts for safety to prevent patient from getting out of bed and falling  Patient keeps taking off mitts somehow  Mitts reapplied and patient reoriented to reason as to why

## 2017-12-04 VITALS
HEIGHT: 76 IN | RESPIRATION RATE: 19 BRPM | OXYGEN SATURATION: 97 % | WEIGHT: 193.12 LBS | BODY MASS INDEX: 23.52 KG/M2 | DIASTOLIC BLOOD PRESSURE: 75 MMHG | TEMPERATURE: 97.8 F | HEART RATE: 118 BPM | SYSTOLIC BLOOD PRESSURE: 107 MMHG

## 2017-12-04 PROBLEM — R06.02 SHORTNESS OF BREATH: Status: RESOLVED | Noted: 2017-11-28 | Resolved: 2017-12-04

## 2017-12-04 RX ORDER — POLYETHYLENE GLYCOL 3350 17 G/17G
17 POWDER, FOR SOLUTION ORAL 2 TIMES DAILY
Qty: 14 EACH | Refills: 0 | COMMUNITY
Start: 2017-12-04

## 2017-12-04 RX ORDER — FENTANYL 100 UG/H
1 PATCH TRANSDERMAL
Qty: 3 PATCH | Refills: 0 | Status: SHIPPED | OUTPATIENT
Start: 2017-12-04 | End: 2017-12-14

## 2017-12-04 RX ORDER — OXYCODONE HYDROCHLORIDE 20 MG/1
20 TABLET ORAL EVERY 4 HOURS PRN
Qty: 30 TABLET | Refills: 0 | Status: SHIPPED | OUTPATIENT
Start: 2017-12-04 | End: 2017-12-14

## 2017-12-04 RX ORDER — LACTULOSE 20 G/30ML
30 SOLUTION ORAL DAILY
Qty: 946 ML | Refills: 0 | Status: SHIPPED | OUTPATIENT
Start: 2017-12-04

## 2017-12-04 RX ADMIN — PANTOPRAZOLE SODIUM 40 MG: 40 TABLET, DELAYED RELEASE ORAL at 09:18

## 2017-12-04 RX ADMIN — DEXAMETHASONE 4 MG: 4 TABLET ORAL at 09:17

## 2017-12-04 RX ADMIN — POLYETHYLENE GLYCOL 3350 17 G: 17 POWDER, FOR SOLUTION ORAL at 09:19

## 2017-12-04 RX ADMIN — HYDROMORPHONE HYDROCHLORIDE 1 MG: 1 INJECTION, SOLUTION INTRAMUSCULAR; INTRAVENOUS; SUBCUTANEOUS at 01:04

## 2017-12-04 RX ADMIN — ACETAMINOPHEN 975 MG: 325 TABLET ORAL at 01:04

## 2017-12-04 RX ADMIN — LACTULOSE 30 G: 20 SOLUTION ORAL at 09:19

## 2017-12-04 RX ADMIN — HYDROMORPHONE HYDROCHLORIDE 1 MG: 1 INJECTION, SOLUTION INTRAMUSCULAR; INTRAVENOUS; SUBCUTANEOUS at 04:32

## 2017-12-04 RX ADMIN — ACETAMINOPHEN 975 MG: 325 TABLET ORAL at 09:30

## 2017-12-04 RX ADMIN — HEPARIN SODIUM 5000 UNITS: 5000 INJECTION, SOLUTION INTRAVENOUS; SUBCUTANEOUS at 06:09

## 2017-12-04 RX ADMIN — HYDROMORPHONE HYDROCHLORIDE 1 MG: 1 INJECTION, SOLUTION INTRAMUSCULAR; INTRAVENOUS; SUBCUTANEOUS at 09:19

## 2017-12-04 NOTE — PROGRESS NOTES
Pt very restless and continuously requests to use bathroom  Assisting pt with bedside commode for safety  Dinner tray arrived around 1900  Offering food and drink to pt about every 30mins  Will continue to encourage to eat and drink

## 2017-12-04 NOTE — PLAN OF CARE
DISCHARGE PLANNING     Discharge to home or other facility with appropriate resources Progressing        DISCHARGE PLANNING - CARE MANAGEMENT     Discharge to post-acute care or home with appropriate resources Progressing        HEMATOLOGIC - ADULT     Maintains hematologic stability Progressing        Knowledge Deficit     Patient/family/caregiver demonstrates understanding of disease process, treatment plan, medications, and discharge instructions Progressing        METABOLIC, FLUID AND ELECTROLYTES - ADULT     Electrolytes maintained within normal limits Progressing     Fluid balance maintained Progressing        Nutrition/Hydration-ADULT     Nutrient/Hydration intake appropriate for improving, restoring or maintaining nutritional needs Progressing        PAIN - ADULT     Verbalizes/displays adequate comfort level or baseline comfort level Progressing        Potential for Falls     Patient will remain free of falls Progressing        Prexisting or High Potential for Compromised Skin Integrity     Skin integrity is maintained or improved Progressing        SAFETY ADULT     Maintain or return to baseline ADL function Progressing     Maintain or return mobility status to optimal level Progressing        SKIN/TISSUE INTEGRITY - ADULT     Skin integrity remains intact Progressing

## 2017-12-04 NOTE — PROGRESS NOTES
PT becoming more agitated, reinforced safety policy with patient and 1:1  Will continue to monitor patient and behavioral needs

## 2017-12-04 NOTE — PROGRESS NOTES
Pt still denying food, but taking sips of juice, water, and tea periodically  Will pass off report to next sitter at 2300

## 2017-12-04 NOTE — SOCIAL WORK
CM spoke to pt and wife Eric Laguerre at bedside  Pt to be discharged home with wife transporting  Hospital bed and rolling walker was already delivered to the home by [facility]   KARI discussed [facility] services, but wife is refusing at this time  She has hired a full time 24hr aide through [employer] who will provide assistance in the home  KARI supplied phone number of the [employer] through [address] for when pt and family are ready

## 2017-12-04 NOTE — PROGRESS NOTES
Pt's O2 without oxygen at rest was 93%  With  2L oxygen at rest his 02 was 100%  Was not able to ambulate patient with or without oxygen  Pt states that he feels better with 2L oxygen on

## 2017-12-04 NOTE — DISCHARGE SUMMARY
Discharge Summary - [facility]    Patient Information: Elisabeth Valle 42 y o  male MRN: [id number]  Unit/Bed#: -01 Encounter: [id number]    Discharging Physician / Practitioner: Fabien Snider MD  PCP: No primary care provider on file  Admission Date: 11/28/2017  Discharge Date: 12/04/17    Reason for Admission:  Edema and pain    Discharge Diagnoses:   Severe protein calorie malnutrition  Principal Problem:    Edema  Active Problems:    Lung cancer ([facility] )    Opiate dependence, continuous ([facility] )  Resolved Problems:    Shortness of breath          Consultations During Hospital Stay:  · Palliative care  · Oncology    Procedures Performed:     · Vascular Dopplers which were negative  · X-rays of the hips sacrum as well as CT scan of cervical spine and the head after a fall which were all negative    Significant Findings / Test Results:     · None    Incidental Findings:   · None     Test Results Pending at Discharge (will require follow up): · None     Outpatient Tests Requested:  · None    Complications:  None    Hospital Course:     Elisabeth Valle is a 42 y o  male patient who originally presented to the hospital on 11/28/2017 due to shortness of breath and pain  History of presenting Arianna Ortega is a 42 y o  male who presents with pain  Patient has a history of stage IV lung cancer and was discharge from the hospital at [facility] couple weeks ago  Patient states while he was there he was put on some aggressive hydration and a lot of IV drips  Since he has been home he has been having difficulty ambulating and has pain when he ambulates secondary to his significant edema which she states was there since he was discharged last time  The patient has been having some shortness of breath as well  The shortness of breath has not been significantly worse    When asked about any pain in the chest area patient does have little bit pain but according to the family at the bedside that pain has not been any worse either  It has been tolerable  The patient right now has a lot of discomfort and states he has not had a bowel movement in 2 and half weeks  According to the wife at the bedside they did make some adjustments the patient's fentanyl on discharge last time  Patient was originally on 37 5 mcg but he was sent home on 100 mcg  Current leak he is on 75 mcg  I did also reach out to the patient's primary oncologist to discuss the case with him  I also reviewed the CT scan report with the wife  She is aware that his pulmonary lesion is enlarging    Hospital course: The patient was admitted for the above reasons  While the patient was here the wife was eventually agreeable to increase the fentanyl to 100 mcg  The patient was seen in consultation by palliative Care and we also spoke to the patient's primary palliative care specialist Dr Vicente Vernon  Unfortunately the patient can be noncompliant with his follow-up and treatments  While he was here the main concern was the edema per the wife  The patient does have advanced lung cancer and hypoalbuminemia which is likely the cause of his significant edema  They did try to explain to the wife that we are limited what we can do with that secondary to 3rd spacing but we did have him on IV Lasix here  He seemed to diurese a little bit and his edema got a little bit better  However the patient was told to keep his legs elevated but did not comply with that and was always sitting up in bed so this did not improve  We tried to also get compression stockings on the patient  We did not have any extra large in stock and were trying to get that prior to discharge  The patient also had some significant constipation and has these issue secondary to being on opioids  We did put the patient on some methylnaltrexone while he was here and put him on a bowel regimen and he is having some bowel movements    Initially when the patient came in we were considering whether the patient needed to go to [facility] for radiation but that was not necessary at this point  Patient should follow up with his primary oncologist and palliative care specialist as an outpatient  We did get the patient a hospital bed and bedside commode  We will see whether the patient needs oxygen at home  The wife like to take him home and she did get somebody at home to take care of him 24 hour  Condition at Discharge: fair     Discharge Day Visit / Exam:     Subjective:  Patient seen examined  Complaining of pain this morning but did not get his medications yet  Vitals: Blood Pressure: 107/75 (12/04/17 0700)  Pulse: (!) 118 (12/04/17 0700)  Temperature: 97 8 °F (36 6 °C) (12/04/17 0700)  Temp Source: Oral (12/04/17 0700)  Respirations: 19 (12/03/17 2300)  Height: 6' 4" (193 cm) (11/29/17 1519)  Weight - Scale: 87 6 kg (193 lb 2 oz) (11/29/17 1519)  SpO2: 97 % (12/04/17 0700)  Exam:   Physical Exam  (   General Appearance:    Alert, cooperative, no distress, appears stated age                               Lungs:     Clear to auscultation bilaterally, respirations unlabored       Heart:    Regular rate and rhythm, S1 and S2 normal, no murmur, rub    or gallop   Abdomen:     Soft, non-tender, bowel sounds active all four quadrants,     no masses, no organomegaly           Extremities:   3+ pedal edema   Discussion with Family:  Discussed with the wife at the bedside    Discharge instructions/Information to patient and family:   See after visit summary for information provided to patient and family  Provisions for Follow-Up Care:  See after visit summary for information related to follow-up care and any pertinent home health orders  Disposition:     Home    For Discharges to [facility]:   · Not Applicable to this Patient - Not Applicable to this Patient    Planned Readmission:  None anticipated     Discharge Statement:  I spent 35 minutes discharging the patient  This time was spent on the day of discharge  I had direct contact with the patient on the day of discharge  Greater than 50% of the total time was spent examining patient, answering all patient questions, arranging and discussing plan of care with patient as well as directly providing post-discharge instructions  Additional time then spent on discharge activities  Time was spent in doing the discharge as well as discharge paperwork and reviewing the hospital course and speaking to the wife    Discharge Medications:  See after visit summary for reconciled discharge medications provided to patient and family        ** Please Note: This note has been constructed using a voice recognition system **

## 2017-12-05 NOTE — CASE MANAGEMENT
Notification of Discharge  This is a Notification of Discharge from our facility [address]  Please be advised that this patient has been discharge from our facility  Below you will find the admission and discharge date and time including the patients disposition  PRESENTATION DATE: 11/28/2017 10:02 AM  IP ADMISSION DATE: 11/28/17 1319  DISCHARGE DATE: 12/4/2017  1:14 PM  DISPOSITION: [address] [place] [address] in the [facility] by Mikey Garay for 2017  Network Utilization Review Department  Phone: [phone]; Fax [phone]  ATTENTION: The Network Utilization Review Department is now centralized for our 7 Facilities  Make a note that we have a new phone and fax numbers for our Department  Please call with any questions or concerns to [phone] and carefully follow the prompts so that you are directed to the right person  All voicemails are confidential  Fax any determinations, approvals, denials, and requests for initial or continue stay review clinical to [phone]  Due to HIGH CALL volume, it would be easier if you could please send faxed requests to expedite your requests and in part, help us provide discharge notifications faster

## 2017-12-05 NOTE — CASE MANAGEMENT
[address] [facility] in the [facility] by Mikey Garay for 2017  Network Utilization Review Department  Phone: [phone]; Fax [phone]  ATTENTION: The Network Utilization Review Department is now centralized for our 7 Facilities  Make a note that we have a new phone and fax numbers for our Department  Please call with any questions or concerns to [phone] and carefully follow the prompts so that you are directed to the right person  All voicemails are confidential  Fax any determinations, approvals, denials, and requests for initial or continue stay review clinical to [phone]  Due to HIGH CALL volume, it would be easier if you could please send faxed requests to expedite your requests and in part, help us provide discharge notifications faster  Continued Stay Review    Date: 12/4    Vital Signs: /75   Pulse (!) 118   Temp 97 8 °F (36 6 °C) (Oral)   Resp 19   Ht 6' 4" (1 93 m)   Wt 87 6 kg (193 lb 2 oz)   SpO2 97%   BMI 23 51 kg/m²     Medications:   Scheduled Meds:   Continuous Infusions:   No current facility-administered medications for this encounter  PRN Meds:     Abnormal Labs/Diagnostic Results:none    Age/Sex: 42 y o  male     Assessment/Plan: Maricel man   Brent Waller is a 42 y o  male patient who originally presented to the hospital on 11/28/2017 due to shortness of breath and pain      History of presenting illness:Fili Valencia is a 42 y  o  male who presents with pain   Patient has a history of stage IV lung cancer and was discharge from the hospital at [facility] couple weeks ago  Irish Neal states while he was there he was put on some aggressive hydration and a lot of IV drips   Since he has been home he has been having difficulty ambulating and has pain when he ambulates secondary to his significant edema which she states was there since he was discharged last time   The patient has been having some shortness of breath as well   The shortness of breath has not been significantly worse   When asked about any pain in the chest area patient does have little bit pain but according to the family at the bedside that pain has not been any worse either   It has been tolerable   The patient right now has a lot of discomfort and states he has not had a bowel movement in 2 and half weeks   According to the wife at the bedside they did make some adjustments the patient's fentanyl on discharge last time   Patient was originally on 37 5 mcg but he was sent home on 100 mcg   Current leak he is on 75 mcg   I did also reach out to the patient's primary oncologist to discuss the case with him  Rudolph Bullock also reviewed the CT scan report with the wife  Yelena Rebolledo is aware that his pulmonary lesion is enlarging     Hospital course: The patient was admitted for the above reasons  While the patient was here the wife was eventually agreeable to increase the fentanyl to 100 mcg  The patient was seen in consultation by palliative Care and we also spoke to the patient's primary palliative care specialist Dr Majo Moreira  Unfortunately the patient can be noncompliant with his follow-up and treatments  While he was here the main concern was the edema per the wife  The patient does have advanced lung cancer and hypoalbuminemia which is likely the cause of his significant edema  They did try to explain to the wife that we are limited what we can do with that secondary to 3rd spacing but we did have him on IV Lasix here  He seemed to diurese a little bit and his edema got a little bit better  However the patient was told to keep his legs elevated but did not comply with that and was always sitting up in bed so this did not improve  We tried to also get compression stockings on the patient  We did not have any extra large in stock and were trying to get that prior to discharge   The patient also had some significant constipation and has these issue secondary to being on opioids  We did put the patient on some methylnaltrexone while he was here and put him on a bowel regimen and he is having some bowel movements  Initially when the patient came in we were considering whether the patient needed to go to [place] for radiation but that was not necessary at this point  Patient should follow up with his primary oncologist and palliative care specialist as an outpatient  We did get the patient a hospital bed and bedside commode  We will see whether the patient needs oxygen at home  The wife like to take him home and she did get somebody at home to take care of him 24 hour  Condition at Discharge: fair        Discharge Plan: Home     Continued Stay Review    Date: 12/3    Vital Signs: /75   Pulse (!) 118   Temp 97 8 °F (36 6 °C) (Oral)   Resp 19   Ht 6' 4" (1 93 m)   Wt 87 6 kg (193 lb 2 oz)   SpO2 97%   BMI 23 51 kg/m²     Medications:   Scheduled Meds:   Continuous Infusions:   No current facility-administered medications for this encounter     PRN Meds:     Abnormal Labs/Diagnostic Results:    Results from last 7 days  Lab Units 12/03/17  0601   11/28/17  1155   WBC Thousand/uL 15 73*  < > 12 72*   HEMOGLOBIN g/dL 10 2*  < > 10 8*   HEMATOCRIT % 32 5*  < > 35 2*   PLATELETS Thousands/uL 282  < > 410*   NEUTROS PCT %  --   --  86*   LYMPHS PCT %  --   --  10*   MONOS PCT %  --   --  4   EOS PCT %  --   --  0   < > = values in this interval not displayed      Results from last 7 days  Lab Units 12/03/17  0601   11/28/17  1155   SODIUM mmol/L 136  < > 138   POTASSIUM mmol/L 4 6  < > 4 5   CHLORIDE mmol/L 98*  < > 98*   CO2 mmol/L 28  < > 26   BUN mg/dL 52*  < > 30*   CREATININE mg/dL 0 99  < > 1 08   CALCIUM mg/dL 9 3  < > 9 7   TOTAL PROTEIN g/dL  --   --  7 2   BILIRUBIN TOTAL mg/dL  --   --  1 50*   ALK PHOS U/L  --   --  695*   ALT U/L  --   --  111*   AST U/L  --   --  163*   GLUCOSE RANDOM mg/dL 97  < > 84   < > = values in this interval not displayed  Age/Sex: 42 y o  male     Assessment/Plan:Assessment:     Principal Problem:    Edema  Active Problems:    Lung cancer (HCC)    Shortness of breath    Opiate dependence, continuous (HCC)   Plan:   · Bilateral lower extremity edema: This is likely secondary to his cancer  He is still complaining of pain  We did increase the patient's fentanyl  To 100 mcg  Palliative Care is following the patient  Patient does not have stockings on this time  They are trying to order the size for the patient  He needs extra large  · Continuous opiate dependence:  Continue with current regimen  · Nausea:  Continue with Zofran  · stage IV metastatic lung cancer:  Patient will need to follow up with his primary oncologist as an outpatient  · Anemia chronic disease  Hemoglobin is stable  · Leukocytosis: This is likely secondary to the steroids  · Anxiety:  Continue with Ativan as needed  ·    VTE Pharmacologic Prophylaxis:   Pharmacologic: Enoxaparin (Lovenox)  Mechanical VTE Prophylaxis in Place: Yes   Patient Centered Rounds: I have performed bedside rounds with nursing staff today    Education and Discussions with Family / Patient:  Discussed with the with   Time Spent for Care: 20 minutes    More than 50% of total time spent on counseling and coordination of care as described above    Current Length of Stay: 5 day(s)   Current Patient Status: Inpatient   Certification Statement: The patient will continue to require additional inpatient hospital stay due to Having intractable pain   Discharge Plan:  Hopeful discharge in the next 1-2 days

## 2017-12-07 ENCOUNTER — GENERIC CONVERSION - ENCOUNTER (OUTPATIENT)
Dept: OTHER | Facility: OTHER | Age: 37
End: 2017-12-07

## 2017-12-11 ENCOUNTER — GENERIC CONVERSION - ENCOUNTER (OUTPATIENT)
Dept: OTHER | Facility: OTHER | Age: 37
End: 2017-12-11

## 2017-12-15 ENCOUNTER — HOSPITAL ENCOUNTER (OUTPATIENT)
Dept: INFUSION CENTER | Facility: CLINIC | Age: 37
Discharge: HOME | End: 2017-12-15
Payer: COMMERCIAL

## 2018-01-03 NOTE — PROGRESS NOTES
Assessment   1  Elevated d-dimer (790 92) (R79 89)   2  Shortness of breath (786 05) (R06 02)   3  Adenocarcinoma of right lung (162 9) (C34 91)    Discussion/Summary   Discussion Summary:    In summary, this is a 75-year-old male history of advanced adenocarcinoma of the lung as outlined  was recently hospitalized from 10/27-11/6 for pain management  He had biopsy of the liver to confirm metastatic adenocarcinoma and send off molecular testing  imaging during hospitalization showed new large mass in the majority of the anteromedial right upper lobe, measuring 9 0 x 14 5 x 14 6 cm  mass is inseparable from the mediastinum, and compresses the hilum  There is masslike consolidation in the right middle lobe, which may tumor versus post obstructive atelectasis  Pre-existing calcified masses in the posterior aspect of the right upper lobe appear unchanged  New 5 mm  apical nodule  Also, multiple new ill-defined hypodense masses; largest measuring 4 8 x 3 4  Right adrenal mass appears increased, measuring approximately 11 0 x 7 5 cm --- previously 10 5 x 6 0 cm  Increased nodular morphology of the left adrenal gland suspicious for metastatic disease  the above with the patient  Discussed that his disease is very extensive and his prognosis is very poor  has history of non compliance with therapy which has contributed to his extensive disease progression  saw palliative care today as well  He was started on Lasix by Dr Mayito Darling for his significant bilateral leg edema  will have STAT CTA of the chest due to worsening SOB  call with plan after CTA report is done  Counseling Documentation With Imm: The patient, patient's family was counseled regarding diagnostic results,-- instructions for management,-- patient and family education,-- impressions  total time of encounter was 40 minutes  Goals and Barriers: The patient has the current Goals: Stat CTA   The patent has the current Barriers: History of non compliance with cancer treatment  Medication SE Review and Pt Understands Tx: The treatment plan was reviewed with the patient/guardian  The patient/guardian understands and agrees with the treatment plan      History of Present Illness   HPI: September 2015- patient presented with worsening right scapular pain over 3-4 weeks  CT chest showed a 16 x 13 x 12 cm lesion in the right upper lobe extending to the hilum  Indeterminate right adrenal invasion measuring 2 5 cm is noted  Biopsy of the pulmonary mass Showed a poorly differentiated carcinoma, TTF-1 and CK 7 positive  P  40, negative  Ultimately this was felt to represent a poorly differentiated adenocarcinoma  2016â CT showed continued left upper lobe mass and 4 x 4 0 5 cm compared with the prior study of December, 12 5 x 5 5 cm  Patient was started on Opdivo 3 mg/kg IV every 2 weeks  2016âpatient developed significant bilateral hand arthropathy with erythema, swelling  Rheumatoid factor strongly positive  Opdivo suspended  Steroids therapy initiated  2016- started with increased cutaneous nodules similar to prior rheumatoid nodules  Treated with steroids with resolution  2016- started to have increase in pain symptoms, paraesthesias head to toe  Seems to respond well to oxycodone  Previous Therapy:    October 2015 - He was on [id number], but was taken off study for noncompliance he had more than 6 weeks of no shows  Cycle # 3 was 1/8/16  [id number] 1200mg and Carboplatin AUC 6 with Neulasta support day 2   2016â Patient was started on Opdivo 3 mg/kg IV every 2 weeks  Interval History: worsening SOB, pain in chest  edema       Review of Systems   Complete-Male:      Constitutional: feeling poorly  Cardiovascular: as noted in HPI-- and-- no palpitations  Respiratory: as noted in HPI  Gastrointestinal: as noted in HPI        Genitourinary: No complaints of dysuria, no incontinence, no hesitancy, no nocturia, no genital lesion, no testicular pain  Musculoskeletal: as noted in HPI  Integumentary: No complaints of skin rash or skin lesions, no itching, no skin wound, no dry skin  Neurological: No compliants of headache, no confusion, no convulsions, no numbness or tingling, no dizziness or fainting, no limb weakness, no difficulty walking  Psychiatric: no anxiety-- and-- no depression  Endocrine: feelings of weakness, but-- no muscle weakness  Hematologic/Lymphatic: No complaints of swollen glands, no swollen glands in the neck, does not bleed easily, no easy bruising  Active Problems   1  Adenocarcinoma of right lung (162 9) (C34 91)   2  Adrenal mass (255 9) (E27 9)   3  Anxiety (300 00) (F41 9)   4  Arthralgia, unspecified joint (719 40) (M25 50)   5  Cancer related pain (338 3) (G89 3)   6  Constipation (564 00) (K59 00)   7  Continuous opioid dependence (304 01) (F11 20)   8  Decreased appetite (783 0) (R63 0)   9  Decreased libido (799 81) (R68 82)   10  Depression (311) (F32 9)   11  GERD (gastroesophageal reflux disease) (530 81) (K21 9)   12  Insomnia (780 52) (G47 00)   13  Irritable mood (799 22) (R45 4)   14  Leg swelling (729 81) (M79 89)   15  Metastasis to adrenal gland (198 7) (C79 70)   16  Nausea (787 02) (R11 0)   17  Neuropathic pain (729 2) (M79 2)   18  Rheumatoid factor positive (795 79) (R76 8)   19  Shortness of breath (786 05) (R06 02)   20  Sleeping difficulty (780 50) (G47 9)   21  Swelling (782 3) (R60 9)   22  Under care of palliative care specialist (V66 7) (Z51 5)    Past Medical History   1  History of Chemotherapy-induced nausea (787 02,E933 1) (R11 0,T45  1X5A)   2  History of Epigastric pain (789 06) (R10 13)   3  History of gastritis (V12 79) (Z87 19)   4  History of Hospital discharge follow-up (V67 59) (Z09)   5  History of Subcutaneous nodule (782 2) (R22 9)    Surgical History   Surgical History Reviewed: The surgical history was reviewed and updated today  Family History   Mother    1  No pertinent family history  Father    2  No pertinent family history  Family History Reviewed: The family history was reviewed and updated today  Social History    · Current some day smoker (305 1) (F17 200)   · Five children   · Former consumption of alcohol (V11 3) (Z87 818)   ·    · Occupation  Social History Reviewed: The social history was reviewed and updated today  The social history was reviewed and is unchanged  Current Meds    1  Dexamethasone 4 MG Oral Tablet; Take 1 tablet daily; Therapy: 59LAJ0946 to (Evaluate:49Qsf3523)  Requested for: 98CVT0921; Last     Rx:16Nov2017 Ordered   2  DiazePAM 5 MG Oral Tablet; 1 tab in am and 2 tabs in pm;     Therapy: 89IXO2472 to (Evaluate:46Dtg8707); Last Rx:16Nov2017 Ordered   3  FentaNYL 75 MCG/HR Transdermal Patch 72 Hour; APPLY 1 PATCH EVERY 3 DAYS; Therapy: 24GGF3078 to (Evaluate:81Ilp5839); Last Rx:16Nov2017 Ordered   4  Furosemide 40 MG Oral Tablet; TAKE 1 TABLET BY MOUTH DAILY AS NEEDED FOR LEG     SWELLING; Therapy: 19EVC8161 to (Evaluate:48Pxs1673)  Requested for: 49FXD4145; Last     Rx:16Nov2017 Ordered   5  Gabapentin 400 MG Oral Capsule; TAKE 1 CAPSULE AT BEDTIME; Therapy: 98SVP2882 to (Evaluate:50Sxc6773)  Requested for: 09IMM0512; Last     Rx:16Nov2017 Ordered   6  Lactulose 20 GM/30ML Oral Solution; 15-30 mL by mouth daily  May take up to 3 extra     doses as needed to prevent constipation; Therapy: 52XWH8273 to ([phone])  Requested for: 07OWZ3412; Last     Rx:09Oct2017 Ordered   7  Mirtazapine 15 MG Oral Tablet; TAKE 1 TABLET AT BEDTIME; Therapy: (Recorded:29Bvl9942) to Recorded   8  Nicotine 21 MG/24HR Transdermal Patch 24 Hour; APPLY 1 PATCH Daily; Therapy: 34Wad8828 to (Evaluate:13Oct2017)  Requested for: 99Cjq5747; Last     Rx:77Vcl9948 Ordered   9  OxyCODONE HCl - 20 MG Oral Tablet; TAKE 1 TABLET Every 3 hours PRN breakthrough     pain;      Therapy: 76QYA9509 to (Evaluate:39Dxl7518); Last Rx:16Nov2017 Ordered   10  Pantoprazole Sodium 40 MG Oral Tablet Delayed Release; TAKE 1 TABLET DAILY 30      MINUTES BEFORE BREAKFAST; Therapy: 35RAQ5664 to (Evaluate:38Eiq0287)  Requested for: 28WLR6504; Last      Rx:29Jun2017 Ordered   11  Tylenol Extra Strength 500 MG Oral Tablet; TAKE 2 TABLET 3 times daily; Therapy: 43RNY4704 to (Evaluate:95Qvi7754)  Requested for: 97BRD3897; Last      Rx:16Nov2017 Ordered  Medication List Reviewed: The medication list was reviewed and updated today  Allergies   1  Nicotine Transdermal System 21-14-7 MG/24HR KIT   2  Ondansetron HCl TABS    Vitals   Vital Signs    Recorded: 88ALJ8429 02:19PM   Temperature 97 7 F   Heart Rate 105   Respiration 20   Systolic 992   Diastolic 62   Height 6 ft 3 in   Weight 200 lb 3 2 oz   BMI Calculated 25 02   BSA Calculated 2 2   O2 Saturation 92   Pain Scale 10     Physical Exam        Constitutional      General appearance: No acute distress, well appearing and well nourished  Eyes      Conjunctiva and lids: No swelling, erythema, or discharge  Ears, Nose, Mouth, and Throat      External inspection of ears and nose: Normal        Oropharynx: Normal with no erythema, edema, exudate or lesions  Pulmonary      Respiratory effort: No increased work of breathing or signs of respiratory distress  Auscultation of lungs: Abnormal   Auscultation of the lungs revealed decreased breath sounds diffusely  Cardiovascular      Auscultation of heart: Normal rate and rhythm, normal S1 and S2, without murmurs  Examination of extremities for edema and/or varicosities: Abnormal  -- pitting edema 3+  Abdomen      Abdomen: Non-tender, no masses  Lymphatic      Palpation of lymph nodes in neck: No lymphadenopathy  Musculoskeletal      Gait and station: Abnormal  -- in wheelchair  Neurologic      Cranial nerves: Cranial nerves 2-12 intact  Psychiatric      Orientation to person, place and time: Normal        Mood and affect: Normal           Results/Data   CTA CHEST PE STUDY 20ORC7078 03:25PM Doris Labrum      Test Name Result Flag Reference   CTA CHEST PE STUDY (Report)     CTA - CHEST WITH IV CONTRAST - PULMONARY ANGIOGRAM           INDICATION: 70-year-old man with known adenocarcinoma the right lung with hepatic and adrenal metastases and mediastinal involvement  Now presenting with shortness of breath and elevated d-dimer level  COMPARISON: CTA of the chest from October 26, 2017  TECHNIQUE: CTA examination of the chest was performed using angiographic technique according to a protocol specifically tailored to evaluate for pulmonary embolism  Reformatted images were created in axial, sagittal, and coronal planes  In addition,       coronal 3D MIP postprocessing was performed on the acquisition scanner  Radiation dose length product (DLP) for this visit: 460 31 mGy-cm   This examination, like all CT scans performed in the [facility], was performed utilizing techniques to minimize radiation dose exposure, including the use of iterative      reconstruction and automated exposure control  IV Contrast: 85 mL of iohexol (OMNIPAQUE)               FINDINGS:           PULMONARY ARTERIAL TREE: No evidence of pulmonary embolus  Prominent main pulmonary artery  Right pulmonary artery displaced and compressed by the patient's known large right-sided lung tumor  LUNGS: Large mass occupying most of the right lung, measuring at least 13 x 17 cm, inseparable from the right hilum and right side of the mediastinum with leftward mediastinal shift  Scattered calcifications in the superior portion of the mass  Subsegmental atelectasis in the base of the right lower lobe   Groundglass opacification throughout much of the left lung, at least a portion of which is probably an artifact of decreased lung volume due to compression by the contralateral mass  Coexisting pulmonary edema could have a similar appearance  Emphysematous changes with large right upper lobe bullae  PLEURA: Small right pleural effusion  HEART/AORTA: Heart displaced to the left of midline by the large right lung mass  Aorta normal in caliber  No aortic dissection  Normal branching of the aortic arch  MEDIASTINUM AND ANDREW: Probable invasion of the right side of the mediastinum by the large right lung tumor  Subcarinal lymphadenopathy  Esophagus unremarkable  Trachea and left mainstem bronchus normal  Compression of portions of the right upper lobe      bronchus, bronchus intermedius and right middle lobe bronchus  No convincing evidence of endobronchial tumor invasion  CHEST WALL AND LOWER NECK:    Normal            VISUALIZED STRUCTURES IN THE UPPER ABDOMEN: Hepatomegaly with multiple liver metastases  Large right adrenal metastasis again noted  OSSEOUS STRUCTURES: No acute fracture or destructive osseous lesion  IMPRESSION:           1  No evidence of pulmonary embolus  2  Large tumor occupying much of the right lung, little change since a CT from 10/26/2017  Probable mediastinal invasion  Compression of portions of the right upper lobe bronchus, bronchus intermedius and right middle lobe bronchus  3  Hepatic metastases  4  Large right adrenal metastasis  Workstation performed: [id number]           Signed by:      Jaqui Lentz MD      11/16/17        Signatures    Electronically signed by : Julio Barroso, [facility]; Dec 31 2017  8:13PM EST                       (Author)     Electronically signed by : YANCY Dhillon  Jan 2 2018  5:37PM EST

## 2018-01-09 NOTE — PROGRESS NOTES
Assessment    1  Adenocarcinoma of right lung (162 9) (C34 91)   2  Metastasis to adrenal gland (198 7) (C79 70)    Plan  Metastasis to adrenal gland    · Drink plenty of fluids ; Status:Complete;   Done: 21RSH9544   Ordered; For:Metastasis to adrenal gland; Ordered By:Jose A Jacinto;   · Follow-up visit in 2 weeks Evaluation and Treatment  Follow-up  Status: Hold For -  Scheduling  Requested for: 65VED2038   Ordered; For: Metastasis to adrenal gland; Ordered By: Judith Gates Performed:  Due: 39XZU6538   · (1) CBC/PLT/DIFF; Status:Active; Requested for:17Nov2016;    Perform:[facility]; RNY:09IXC9256;MYDZUVH; For:Metastasis to adrenal gland; Ordered By:Jose A Jacinto;   · (1) COMPREHENSIVE METABOLIC PANEL; Status:Active; Requested for:17Nov2016;    Perform:[facility]; EZF:43XUZ1552;YNRRYHU; For:Metastasis to adrenal gland; Ordered By:Jose A Jacinto;   · * CT CHEST ABDOMEN PELVIS W CONTRAST; Status:Need Information - Financial  Authorization; Requested for:17Nov2016;    Perform:[facility]; VPH:23VLW7878;LSNRFIG; For:Metastasis to adrenal gland; Ordered By:Jose A Jacinto;    Discussion/Summary  Discussion Summary:   In summary, this is a 43year old man with a history of lung cancer as outlined  She is clinically stable  He is having some increasing diffuse pains  He states that these are not bone pains, nor are they reminiscent of his initial presenting pain in the right scapular region  The significance of these symptoms is unclear  Nonetheless, repeat staging is certainly reasonable, given that his last imaging was in June 2016  We reviewed the results of his previous scan, compared to scans prior to that  Additionally, we reviewed questions revolving around potential treatment options that would be applicable in his disease is progressive  There are not significant data using immunotherapy after progression and discontinuation of previous immunotherapy    This could be done, but a concern would revolve around exacerbation of underlying rheumatoid symptoms  Additionally chemotherapy remains an alternative option  Perhaps repeat biopsy to evaluate actionable mutations is considered as well  I reviewed the above with the patient and his wife  They voiced understanding and agreement  I'll see him back in 2 weeks with repeat imaging and blood work just prior to that visit  Understands and agrees with treatment plan: The treatment plan was reviewed with the patient/guardian  The patient/guardian understands and agrees with the treatment plan   Counseling Documentation With Imm: The patient was counseled regarding diagnostic results, instructions for management, patient and family education, impressions  total time of encounter was 25 minutes  Chief Complaint  Chief Complaint Free Text Note Form: Follow-up regarding lung cancer  History of Present Illness  HPI: September 2015- patient presented with worsening right scapular pain over 3-4 weeks  CT chest showed a 16 x 13 x 12 cm lesion in the right upper lobe extending to the hilum  Indeterminate right adrenal invasion measuring 2 5 cm is noted  Biopsy of the pulmonary mass Showed a poorly differentiated carcinoma, TTF-1 and CK 7 positive  P  40, negative  Ultimately this was felt to represent a poorly differentiated adenocarcinoma  April 2016- CT showed continued left upper lobe mass and 4 x 4 0 5 cm compared with the prior study of December, 12 5 x 5 5 cm  Patient was started on Opdivo 3 mg/kg IV every 2 weeks  May 2016-patient developed significant bilateral hand arthropathy with erythema, swelling  Rheumatoid factor strongly positive  Opdivo suspended  Steroids therapy initiated  August 2016- started with increased cutaneous nodules similar to prior rheumatoid nodules  Treated with steroids with resolution     September/October 2016- started to have increase in pain symptoms, paraesthesias "head to toe " Seems to respond well to oxycodone  Previous Therapy:   October 2015 - He was on EE80972, but was taken off study for noncompliance he had more than 6 weeks of no shows  Cycle # 3 was 1/8/16  LLDJ4523J 1200mg and Carboplatin AUC 6 with Neulasta support day 2  April 2016- Patient was started on Opdivo 3 mg/kg IV every 2 weeks  Interval History: Past few months has had increased      Review of Systems  Complete-Male:   Constitutional: chills  Eyes: No complaints of eye pain, no red eyes, no discharge from eyes, no itchy eyes  ENT: no complaints of earache, no hearing loss, no nosebleeds, no nasal discharge, no sore throat, no hoarseness  Cardiovascular: No complaints of slow heart rate, no fast heart rate, no chest pain, no palpitations, no leg claudication, no lower extremity  Respiratory: shortness of breath during exertion    The patient presents with complaints of intermittent episodes of cough, described as non-productive  The patient presents with complaints of nausea (better with Zofran  )  The patient presents with complaints of constipation starting about 1-2 weeks ago  Genitourinary: No complaints of dysuria, no incontinence, no hesitancy, no nocturia, no genital lesion, no testicular pain  Musculoskeletal: as noted in HPI  Integumentary: No complaints of skin rash or skin lesions, no itching, no skin wound, no dry skin  Neurological: No compliants of headache, no confusion, no convulsions, no numbness or tingling, no dizziness or fainting, no limb weakness, no difficulty walking  Psychiatric: Is not suicidal, no sleep disturbances, no anxiety or depression, no change in personality, no emotional problems  Endocrine: No complaints of proptosis, no hot flashes, no muscle weakness, no erectile dysfunction, no deepening of the voice, no feelings of weakness  Hematologic/Lymphatic: No complaints of swollen glands, no swollen glands in the neck, does not bleed easily, no easy bruising        Active Problems    1  Adenocarcinoma of right lung (162 9) (C34 91)   2  Adrenal mass (255 9) (E27 9)   3  Anxiety (300 00) (F41 9)   4  Arthralgia, unspecified joint (719 40) (M25 50)   5  Cancer related pain (338 3) (G89 3)   6  Decreased appetite (783 0) (R63 0)   7  Depression (311) (F32 9)   8  Insomnia (780 52) (G47 00)   9  Nausea (787 02) (R11 0)   10  Neuropathic pain (729 2) (M79 2)   11  Rheumatoid factor positive (795 79) (R76 8)    Past Medical History    1  History of Chemotherapy-induced nausea (787 02,E933 1) (R11 0,T45  1X5A)   2  History of Epigastric pain (789 06) (R10 13)   3  History of gastritis (V12 79) (Z87 19)   4  History of Hospital discharge follow-up (V67 59) (Z09)   5  History of Subcutaneous nodule (782 2) (R22 9)   6  History of Swelling (782 3) (R60 9)    Family History  Mother    1  No pertinent family history  Father    2  No pertinent family history    Social History    · Current some day smoker (305 1) (F17 210)   · Five children   · Former consumption of alcohol (V11 3) (Z87 898)   ·    · Occupation    Current Meds   1  Etodolac 500 MG Oral Tablet; TAKE 1 TABLET TWICE DAILY WITH MEALS; Therapy: 41EJX4879 to (Evaluate:13May2017)  Requested for: 77VJQ8094; Last   Rx:14Nov2016 Ordered   2  FentaNYL 12 MCG/HR Transdermal Patch 72 Hour; APPLY 1 PATCH EVERY 3 DAYS,   together with 25mcg/hr patch for total of 37mcg/hr; Therapy: 04XLK2315 to (Evaluate:22Csa7103); Last Rx:14Nov2016 Ordered   3  FentaNYL 25 MCG/HR Transdermal Patch 72 Hour; APPLY 1 PATCH EVERY 3 DAYS,   together with 12mcg/hr patch for total of 37mcg/hr; Therapy: 91Bor6827 to (Evaluate:37Ack6789); Last Rx:14Nov2016 Ordered   4  Mirtazapine 15 MG Oral Tablet; TAKE 1 TABLET AT BEDTIME; Therapy: 84NCB0901 to (Evaluate:19Zag4293)  Requested for: 69XRZ5580; Last   Rx:14Nov2016 Ordered   5  Omeprazole 20 MG Oral Capsule Delayed Release; TAKE 1 CAPSULE DAILY 30 minutes   before eating;    Therapy: 27Apr2016 to (Evaluate:22Jan2017)  Requested for: 29Quu2861; Last   Rx:00Cwt9851 Ordered   6  OxyCODONE HCl - 15 MG Oral Tablet; Take 1 tablet up to 4 times a day as needed for   breakthrough pain; Therapy: 88FUC4482 to (Evaluate:29Xzq7979); Last Rx:14Nov2016 Ordered    Allergies    1  Nicotine Transdermal System 21-14-7 MG/24HR KIT   2  Ondansetron HCl TABS    Vitals  Vital Signs    Recorded: 89WJS8780 83:84CY   Systolic 684   Diastolic 72   Heart Rate 75   Respiration 18   Temperature 98 1 F   Pain Scale 0   O2 Saturation 98   Height 6 ft 3 4 in   Weight 181 lb    BMI Calculated 22 38   BSA Calculated 2 11     Physical Exam    Constitutional   General appearance: No acute distress, well appearing and well nourished  Eyes   Conjunctiva and lids: No swelling, erythema, or discharge  Ears, Nose, Mouth, and Throat   External inspection of ears and nose: Normal     Oropharynx: Normal with no erythema, edema, exudate or lesions  Pulmonary   Auscultation of lungs: Clear to auscultation, equal breath sounds bilaterally, no wheezes, no rales, no rhonci  Cardiovascular   Auscultation of heart: Normal rate and rhythm, normal S1 and S2, without murmurs  Examination of extremities for edema and/or varicosities: Normal     Abdomen   Abdomen: Non-tender, no masses  Liver and spleen: No hepatomegaly or splenomegaly  Lymphatic   Palpation of lymph nodes in neck: No lymphadenopathy  Musculoskeletal   Gait and station: Normal     Skin   Skin and subcutaneous tissue: Normal without rashes or lesions  Neurologic   Cranial nerves: Cranial nerves 2-12 intact  Psychiatric   Orientation to person, place and time: Normal          Future Appointments    Date/Time Provider Specialty Site   12/12/2016 08:40 AM YANCY Choudhury   [address]     Signatures   Electronically signed by : YANCY Snider ,DO; Nov 17 2016  3:05PM EST                       (Author)

## 2018-01-09 NOTE — CONSULTS
Chief Complaint  Follow up - hospital stay      History of Present Illness  Here with his wife Major Nash with stage IV lung cancer presents post hospital follow up  He continues to suffer with severe pain from his biopsy site  He is using fentanyl 75mcg patchs and oxy 20mg every 3 hours pretty much around the clock  His symptoms have inhibited his ability to sleep  He feels short of breath and wonders if he can get oxygen  THey have been calling EMS for it and he thinks it really helps  He has noted bloating in his abdomen and bilateral leg swelling  Review of Systems    Constitutional: feeling poorly, recent weight gain and feeling tired, but No fever or chills, feels well, no tiredness, no recent weight gain or weight loss  Eyes: No complaints of eye pain, no red eyes, no discharge from eyes, no itchy eyes  ENT: no complaints of earache, no hearing loss, no nosebleeds, no nasal discharge, no sore throat, no hoarseness  Cardiovascular: lower extremity edema, but No complaints of slow heart rate, no fast heart rate, no chest pain, no palpitations, no leg claudication, no lower extremity  Respiratory: shortness of breath, cough and shortness of breath during exertion, but No complaints of shortness of breath, no wheezing, no cough, no SOB on exertion, no orthopnea or PND  Gastrointestinal: constipation, but No complaints of abdominal pain, no constipation, no nausea or vomiting, no diarrhea or bloody stools  Genitourinary: No complaints of dysuria, no incontinence, no hesitancy, no nocturia, no genital lesion, no testicular pain  Musculoskeletal: arthralgias, limb pain and limb swelling, but No complaints of arthralgia, no myalgias, no joint swelling or stiffness, no limb pain or swelling  Integumentary: No complaints of skin rash or skin lesions, no itching, no skin wound, no dry skin     Neurological: difficulty walking, but No compliants of headache, no confusion, no convulsions, no numbness or tingling, no dizziness or fainting, no limb weakness, no difficulty walking  Psychiatric: anxiety and sleep disturbances, but Is not suicidal, no sleep disturbances, no anxiety or depression, no change in personality, no emotional problems  Endocrine: No complaints of proptosis, no hot flashes, no muscle weakness, no erectile dysfunction, no deepening of the voice, no feelings of weakness  Hematologic/Lymphatic: No complaints of swollen glands, no swollen glands in the neck, does not bleed easily, no easy bruising  Active Problems    1  Adenocarcinoma of right lung (162 9) (C34 91)   2  Adrenal mass (255 9) (E27 9)   3  Anxiety (300 00) (F41 9)   4  Arthralgia, unspecified joint (719 40) (M25 50)   5  Cancer related pain (338 3) (G89 3)   6  Constipation (564 00) (K59 00)   7  Continuous opioid dependence (304 01) (F11 20)   8  Decreased appetite (783 0) (R63 0)   9  Decreased libido (799 81) (R68 82)   10  Depression (311) (F32 9)   11  GERD (gastroesophageal reflux disease) (530 81) (K21 9)   12  Insomnia (780 52) (G47 00)   13  Irritable mood (799 22) (R45 4)   14  Metastasis to adrenal gland (198 7) (C79 70)   15  Nausea (787 02) (R11 0)   16  Neuropathic pain (729 2) (M79 2)   17  Rheumatoid factor positive (795 79) (R76 8)   18  Swelling (782 3) (R60 9)   19  Under care of palliative care specialist (V66 7) (Z51 5)    Past Medical History    · History of Chemotherapy-induced nausea (787 02,E933 1) (R11 0,T45  1X5A)   · History of Epigastric pain (789 06) (R10 13)   · History of gastritis (V12 79) (Z87 19)   · History of Hospital discharge follow-up (V67 59) (Z09)   · History of Subcutaneous nodule (782 2) (R22 9)    Family History    · No pertinent family history    · No pertinent family history    Social History    · Current some day smoker (305 1) (F17 200)   · Five children   · Former consumption of alcohol (V11 3) (Z87 898)   ·    · Occupation  The social history was reviewed and updated today  Current Meds   1  DiazePAM 5 MG Oral Tablet; 1 tab in am and 2 tabs in pm;   Therapy: 38UWM6144 to Recorded   2  FentaNYL 12 MCG/HR Transdermal Patch 72 Hour; APPLY 1 PATCH EVERY 3 DAYS; Therapy: 61JEW6660 to (Evaluate:08Nov2017); Last Rx:09Oct2017 Ordered   3  FentaNYL 75 MCG/HR Transdermal Patch 72 Hour; APPLY 1 PATCH EVERY 3 DAYS; Therapy: 97DFB7404 to (Riky Epstein) Recorded   4  Gabapentin 400 MG Oral Capsule; TAKE 1 CAPSULE AT BEDTIME; Therapy: 51KWV3928 to (Layne Christy)  Requested for: 63JTP4808; Last   Rx:01Jun2017 Ordered   5  Lactulose 20 GM/30ML Oral Solution; 15-30 mL by mouth daily  May take up to 3 extra   doses as needed to prevent constipation; Therapy: 98LQA7698 to ([phone])  Requested for: 76KJB5405; Last   Rx:09Oct2017 Ordered   6  Mirtazapine 15 MG Oral Tablet; TAKE 1 TABLET AT BEDTIME; Therapy: (Recorded:76Kqg8113) to Recorded   7  Naproxen TABS; Therapy: (CSWGPDXV:53ESO4855) to Recorded   8  Nicotine 21 MG/24HR Transdermal Patch 24 Hour; APPLY 1 PATCH Daily; Therapy: 19Xuw4471 to (Evaluate:13Oct2017)  Requested for: 64Tba8009; Last   Rx:35Nsf4726 Ordered   9  OxyCODONE HCl - 20 MG Oral Tablet; TAKE 1 TABLET Every 4 hours PRN; Therapy: 11ISS9017 to (Evaluate:62Kab8348) Recorded   10  Pantoprazole Sodium 40 MG Oral Tablet Delayed Release; TAKE 1 TABLET DAILY 30    MINUTES BEFORE BREAKFAST; Therapy: 32WUX8725 to (Evaluate:25Fip0784)  Requested for: 83MIF8638; Last    Rx:29Jun2017 Ordered    The medication list was reviewed and updated today  Allergies    1  Nicotine Transdermal System 21-14-7 MG/24HR KIT   2   Ondansetron HCl TABS    Vitals   Recorded: 62LTD4585 01:26PM Recorded: 97TUA3171 11:56AM   Heart Rate  123   Respiration  18   Systolic  742   Diastolic  70   Height  6 ft 3 in   Weight  201 lb    BMI Calculated  25 12   BSA Calculated  2 21   O2 Saturation 88      Physical Exam    Constitutional   General appearance: No acute distress, well appearing and well nourished  appears fatigued and short of breath  Eyes   Conjunctiva and lids: No swelling, erythema, or discharge  Pupils and irises: Equal, round and reactive to light  Ears, Nose, Mouth, and Throat   External inspection of ears and nose: Normal     Nasal mucosa, septum, and turbinates: Normal without edema or erythema  Oropharynx: Normal with no erythema, edema, exudate or lesions  Pulmonary   Respiratory effort: Abnormal     Auscultation of lungs: Abnormal   decreased breath sounds right side  Cardiovascular   Auscultation of heart: Normal rate and rhythm, normal S1 and S2, without murmurs  Examination of extremities for edema and/or varicosities: Abnormal   +++2 bilateral LE edema  Abdomen   Abdomen: Abnormal   ascites vs bloating  Musculoskeletal   Gait and station: Abnormal   in wheelchair  Skin   Skin and subcutaneous tissue: Normal without rashes or lesions  Neurologic   Cranial nerves: Cranial nerves 2-12 intact  Psychiatric   Orientation to person, place and time: Normal     Mood and affect: Normal          Assessment    1  Sleeping difficulty (780 50) (G47 9)   2  Leg swelling (729 81) (M79 89)   3  Adenocarcinoma of right lung (162 9) (C34 91)   4  Cancer related pain (338 3) (G89 3)   5  Insomnia (780 52) (G47 00)   6  Shortness of breath (786 05) (R06 02)    Plan  Adenocarcinoma of right lung    · Follow-up visit in 2 weeks Evaluation and Treatment  Follow-up  Status: Hold For -  Scheduling  Requested for: 51FXS2992   Ordered; For: Adenocarcinoma of right lung; Ordered By: Arthur Cedillo Performed:  Due: 04BLT3893  Anxiety, Sleeping difficulty    · DiazePAM 5 MG Oral Tablet; 1 tab in am and 2 tabs in pm   Rx By: Arthur Cedillo; Dispense: 30 Days ; #:90 Tablet;  Refill: 0; For: Anxiety, Sleeping difficulty; BRYCE = N; Print Rx  Arthralgia, unspecified joint, Cancer related pain    · FentaNYL 12 MCG/HR Transdermal Patch 72 Hour   Rx By: Dav Friday; Dispense: 30 Days ; #:10 Patch 72 Hour; Refill: 0; For: Arthralgia, unspecified joint, Cancer related pain; BRYCE = N; Print Rx   · From  OxyCODONE HCl - 20 MG Oral Tablet TAKE 1 TABLET Every 4 hours  PRN To OxyCODONE HCl - 20 MG Oral Tablet TAKE 1 TABLET Every 3 hours PRN  breakthrough pain   Rx By: Dav Friday; Dispense: 15 Days ; #:120 Tablet; Refill: 0; For: Arthralgia, unspecified joint, Cancer related pain; BRYCE = N; Print Rx   · FentaNYL 75 MCG/HR Transdermal Patch 72 Hour; APPLY 1 PATCH EVERY 3  DAYS   Rx By: Dav Friday; Dispense: 30 Days ; #:10 Patch 72 Hour; Refill: 0; For: Arthralgia, unspecified joint, Cancer related pain; BRYCE = N; Print Rx  Cancer related pain    · Tylenol Extra Strength 500 MG Oral Tablet; TAKE 2 TABLET 3 times daily   Rx By: Dav Friday; Dispense: 30 Days ; #:180 Tablet; Refill: 2; For: Cancer related pain; BRYCE = N; Verified Transmission to "2,10E+07"; Last Updated By: System, [employer]; 11/16/2017 12:54:03 PM  Cancer related pain, Decreased appetite, Rheumatoid factor positive    · Dexamethasone 4 MG Oral Tablet; Take 1 tablet daily   Rx By: Dav Friday; Dispense: 30 Days ; #:30 Tablet; Refill: 2; For: Cancer related pain, Decreased appetite, Rheumatoid factor positive; BRYCE = N; Verified Transmission to "2,10E+07"; Last Updated By: System, [employer]; 11/16/2017 12:24:21 PM  Leg swelling    · Furosemide 40 MG Oral Tablet; TAKE 1 TABLET BY MOUTH DAILY AS NEEDED  FOR LEG SWELLING   Rx By: Dav Friday; Dispense: 30 Days ; #:30 Tablet; Refill: 0; For: Leg swelling; BRYCE = N; Verified Transmission to "2,10E+07"; Last Updated By: System, [employer]; 11/16/2017 12:45:31 PM  Neuropathic pain    · Gabapentin 400 MG Oral Capsule; TAKE 1 CAPSULE AT BEDTIME   Rx By: Dav Friday; Dispense: 30 Days ; #:30 Capsule;  Refill: 5; For: Neuropathic pain; BRYCE = N; Verified Transmission to [address]; Last Updated By: System, [employer]; 11/16/2017 12:42:30 PM  Unlinked    · Naproxen TABS   Dispense: 0 Days ; #: Sufficient TABS; Refill: 0; BRYCE = N; Record; Last Updated By: Teresa Bazzi; 11/16/2017 12:09:02 PM    Discussion/Summary    Stage 4 lung cancer - needs to get into see med/onc ASAP  Current symptoms may even warrant another admission  I made arrangements for him to see med/onc today at [facility] - concern for worsening disease  Needs disease directed therapy ASAP  May need tap? resting pulse ox 92% and ambulatory pulse ox 88% - I communicated this to med/onc and they will work on getting him home oxygen  Leg swelling - dexamethasone decrease to 4mg daily  Stop all use of NSAIDS  Start lasix 40mg daily  Cancer pain - continue fentanyl 75mcg and use of oxy 20mg PRN  Continue use of adjuncts in the form of tylenol, gabapentin, valium    Anxiety/insomnia - take valium 5mg in AM and 10mg in PM    The patient, patient's family was counseled regarding  The patient has the current Goals: Treat cancer  The patent has the current Barriers: Non compliance  Patient is unable to Self-Care:   Possible side effects of new medications were reviewed with the patient/guardian today  The treatment plan was reviewed with the patient/guardian  The patient/guardian understands and agrees with the treatment plan     Self Referrals: No      Signatures   Electronically signed by :  YANCY Zamudio ; Nov 16 2017  1:31PM EST                       (Author)

## 2018-01-10 NOTE — PROGRESS NOTES
Assessment    1  Adenocarcinoma of right lung (162 9) (C34 91)   2  Subcutaneous nodule (782 2) (R22 9)    Plan  Adenocarcinoma of right lung    · OxyCODONE HCl - 5 MG Oral Tablet; TAKE 1 TO 2 TABLETS EVERY 4 HOURS  AS NEEDED FOR PAIN  For ongoing pain control   Rx By: Jenna Edwards; Dispense: 15 Days ; #:100 Tablet; Refill: 0; For: Adenocarcinoma of right lung; BRYCE = N; Print Rx   · Follow-up visit in 1 month Evaluation and Treatment  Follow-up  Status: Hold For -  Scheduling  Requested for: 25Apr2016   Ordered; For: Adenocarcinoma of right lung; Ordered By: Jenna Edwards Performed:  Due: 30Apr2016  Subcutaneous nodule    · US EXTREMITY SOFT TISSUE; Status:Hold For - Scheduling; Requested for:25Apr2016;    Perform:[facility] Radiology; Order Comments:extensor surfaces bilateral forearms; determine if cystic or solid lesions; Due:25Apr2017;Ordered; For:Subcutaneous nodule; Ordered By:Roney Zaragoza; Discussion/Summary  Discussion Summary:   In summary, this is a 57-year-old male who presented with right scapular pain  Core biopsy 9/18/16 of the 16 7cm mass identified poorly differentiated non-small carcinoma with areas of necrosis  No evidence of ALK gene rearrangement or mutation  No evidence of Ros-1 gene rearrangement  Onkosight testing 10/1/16 was consistent with adenocarcinoma  EGFR and KRAS mutations were negative  Brain MRI 9/16/15 identified: Normal MRI of the brain  No evidence of metastatic disease  Pet/ CT 10/2/15: Large right lung mass is again demonstrated, involving most of the right upper lung and perihilar region, with mass effect or direct involvement on the superior right lower lung and medial right middle lung  This mass extends to the pleural surface  This mass measures at least 13 2 x 15 4 x 19 5 cm  SUV measures 9 2  This is compatible with known malignancy  This mass has mass effect on central right lung bronchi   This mass also extends to the mediastinum and azygoesophageal recess  Mass effect on the SVC is also noted  Shift of the mediastinum to the left   9 x 2 3 cm right adrenal nodule demonstrates hypermetabolism, SUV   8 1, most compatible with a metastasis  He was on [id number], but was then off study now for noncompliance he had more than 6 weeks of no shows  Cycle # 3 was 1/8/16  [id number] 1200mg and Carboplatin AUC 6 with Neulasta support day 2  Taxol discontinued secondary to reaction  He was re-imaged 12/24/15 with CT C/A/P: There had been significant interval reduction in size within the mass involving the right upper lobe  Largest dimension of mass now measures 12 6 x 5 4 cm  Prior measurement 15 5 x 8 8 cm  Mass continues to encase the right bronchus and bronchus intermedius  Mass   exerts compressive effect on the SVC  SVC remains patent  CT C/A/P 4/9/16 showed a very favorable response to treatment with reduction in the primary lung mass from 13 cm to 4 6 cm; adrenal mass stable  He started Opdivo 4 /18/16  Overall very well tolerated  Will continue  Patient reports tenderness at site of extensor nodularity an on left, feels a tingling to C8 dermatome with pressure  Will obtain ultrasound to see if cystic or solid  He will see pain management/ palliative care this week,  Medication SE Review and Pt Understands Tx: Possible side effects of new medications were reviewed with the patient/guardian today  The treatment plan was reviewed with the patient/guardian  The patient/guardian understands and agrees with the treatment plan       Counseling Documentation With Imm: total time of encounter was 40 minutes and 30 minutes was spent counseling  History of Present Illness  HPI: September 2015- patient presented with worsening right scapular pain over 3-4 weeks  CT chest showed a 16 x 13 x 12 cm lesion in the right upper lobe extending to the hilum  Indeterminate right adrenal invasion measuring 2 5 cm is noted   Biopsy of the pulmonary mass Showed a poorly differentiated carcinoma, TTF-1 and CK 7 positive  P  40, negative  Ultimately this was felt to represent a poorly differentiated adenocarcinoma  He experienced a chemo reaction to Taxol  He was hospitalized overnight secondary to dehydration, and intractable nausea and vomiting  This was felt to be secondary to a viral GI illness that his wife also had  Previous Therapy:   He was on [id number], but wasa taken off study for noncompliance he had more than 6 weeks of no shows  Cycle # 3 was 1/8/16  [id number] 1200mg and Carboplatin AUC 6 with Neulasta support day 2  Current Therapy: Opdivo 3mg/kg IV every 2 weeks  # 1 4/18/16   Interval History: Had some nausea / upset stomach after 1st treatment  Found relief with peppermint  Symptoms lasted x 3 days but now states he 'feels like a million bucks ' Still having pain issues  Will be seeing pain management later this week  Review of Systems  Complete-Male:   Constitutional: No fever or chills, feels well, no tiredness, no recent weight gain or weight loss  Eyes: No complaints of eye pain, no red eyes, no discharge from eyes, no itchy eyes  ENT: no complaints of earache, no hearing loss, no nosebleeds, no nasal discharge, no sore throat, no hoarseness  Cardiovascular: No complaints of slow heart rate, no fast heart rate, no chest pain, no palpitations, no leg claudication, no lower extremity  Respiratory: No complaints of shortness of breath, no wheezing, no cough, no SOB on exertion, no orthopnea or PND  Gastrointestinal: No complaints of abdominal pain, no constipation, no nausea or vomiting, no diarrhea or bloody stools  Genitourinary: No complaints of dysuria, no incontinence, no hesitancy, no nocturia, no genital lesion, no testicular pain  Musculoskeletal: as noted in HPI  Integumentary: No complaints of skin rash or skin lesions, no itching, no skin wound, no dry skin  Neurological: as noted in HPI     Psychiatric: Is not suicidal, no sleep disturbances, no anxiety or depression, no change in personality, no emotional problems  Endocrine: No complaints of proptosis, no hot flashes, no muscle weakness, no erectile dysfunction, no deepening of the voice, no feelings of weakness  Hematologic/Lymphatic: No complaints of swollen glands, no swollen glands in the neck, does not bleed easily, no easy bruising  Active Problems    1  Acute pain (338 19) (R52)   2  Adenocarcinoma of right lung (162 9) (C34 91)   3  Adrenal mass (255 9) (E27 9)   4  Anxiety (300 00) (F41 9)   5  Cancer related pain (338 3) (G89 3)   6  Chemotherapy-induced nausea (787 02,E933 1) (R11 0,T45  1X5A)   7  Epigastric pain (789 06) (R10 13)   8  Gastritis (535 50) (K29 70)   9  Hospital discharge follow-up (V67 59) (Z09)    Family History  Mother    1  No pertinent family history  Father    2  No pertinent family history    Social History    · Current some day smoker (305 1) (F17 210)   · Former consumption of alcohol (V11 3) (P40 035)    Current Meds   1  OxyCODONE HCl - 5 MG Oral Tablet; TAKE 1 TO 2 TABLETS EVERY 4 HOURS AS   NEEDED FOR PAIN  For ongoing pain control; Therapy: 73SJY7412 to (Evaluate:31Hme2228); Last Rx:18Apr2016 Ordered   2  OxyCONTIN 20 MG Oral Tablet ER 12 Hour Abuse-Deterrent; TAKE 1 TABLET EVERY 12   HOURS DAILY for ONGOING PAIN CONTROL; Therapy: 39Bdx2174 to (Last Rx:72Vdj8078) Ordered    Allergies    1  No Known Drug Allergies    Vitals  Vital Signs [Data Includes: Current Encounter]    Recorded: 25Apr2016 09:18AM   Temperature 97 F   Heart Rate 83   Respiration 18   Systolic 954   Diastolic 80   Height 6 ft 4 in   Weight 165 lb 4 96 oz   BMI Calculated 20 12   BSA Calculated 2 04   O2 Saturation 99   Pain Scale 10     Physical Exam    Constitutional   General appearance: No acute distress, well appearing and well nourished  Eyes   Conjunctiva and lids: No swelling, erythema, or discharge      Pupils and irises: Equal, round and reactive to light  Ears, Nose, Mouth, and Throat   External inspection of ears and nose: Normal     Oropharynx: Normal with no erythema, edema, exudate or lesions  Pulmonary   Respiratory effort: No increased work of breathing or signs of respiratory distress  Auscultation of lungs: Clear to auscultation, equal breath sounds bilaterally, no wheezes, no rales, no rhonci  Cardiovascular   Auscultation of heart: Normal rate and rhythm, normal S1 and S2, without murmurs  trace swelling of right 3rd digit of hand/ toe without redness, warmth  Abdomen   Abdomen: Non-tender, no masses  Liver and spleen: No hepatomegaly or splenomegaly  Musculoskeletal   Gait and station: Normal   extensor surfaces Bilateral arms, firm nodularity without warmth, swelling, erythema  Psychiatric   Orientation to person, place and time: Normal     Mood and affect: Normal     Additional Exam:  No facial swelling  No fullness supraclavicular region  No abnormal cervical vasculature apparent  ECOG 1       Future Appointments    Date/Time Provider Specialty Site   05[facility]09[facility]2016 09:30 [facility]   04/27/2016 09:00 AM YANCY Vazquez  Palliative Care  [facility]     Signatures   Electronically signed by : Inga Guan [facility]; Apr 25 2016  9:45AM EST                       (Author)    Electronically signed by : Inga Guan [facility]; Apr 25 2016  9:45AM EST                       (Author)    Electronically signed by : YANCY Roche D ,DO;  Apr 25 2016  5:14PM EST

## 2018-01-11 NOTE — PROGRESS NOTES
Assessment    1  Arthralgia, unspecified joint (719 40) (M25 50)   2  Adrenal mass (255 9) (E27 9)   3  Adenocarcinoma of right lung (162 9) (C34 91)   4  Continuous opioid dependence (304 01) (F11 20)   5  Irritable mood (799 22) (R45 4)    Plan  Adenocarcinoma of right lung, Cancer related pain, Neuropathic pain, Rheumatoid factor  positive    · FentaNYL 50 MCG/HR Transdermal Patch 72 Hour   Rx By: Elisa Chacon; Dispense: 30 Days ; #:10 Patch 72 Hour; Refill: 0; For: Adenocarcinoma of right lung, Cancer related pain, Neuropathic pain, Rheumatoid factor positive; BRYCE = N; Print Rx  Arthralgia, unspecified joint, Cancer related pain    · Ibuprofen 600 MG Oral Tablet; take 1 tablet by mouth every 8 hours as needed for  pain   Rx By: Elisa Chacon; Dispense: 30 Days ; #:90 Tablet; Refill: 2; For: Arthralgia, unspecified joint, Cancer related pain; BRYCE = N; Verified Transmission to [address] [place] [address]; Last Updated By: System, [employer]; 8/21/2017 9:55:39 AM   · OxyCODONE HCl - 10 MG Oral Tablet; 1 tab PO every 4 hours as needed for  breatkthrough pain   Rx By: Elisa Chacon; Dispense: 30 Days ; #:180 Tablet; Refill: 0; For: Arthralgia, unspecified joint, Cancer related pain; BRYCE = N; Print Rx  Cancer related pain, Continuous opioid dependence    · FentaNYL 12 MCG/HR Transdermal Patch 72 Hour; APPLY 1 PATCH EVERY 3  DAYS   Rx By: Elisa Chacon; Dispense: 15 Days ; #:5 Patch 72 Hour; Refill: 0; For: Cancer related pain, Continuous opioid dependence; BRYCE = N; Print Rx   · FentaNYL 25 MCG/HR Transdermal Patch 72 Hour; APPLY 1 PATCH EVERY 3  DAYS   Rx By: Elisa Chacon; Dispense: 15 Days ; #:5 Patch 72 Hour; Refill: 0; For: Cancer related pain, Continuous opioid dependence; BRYCE = N; Print Rx  Cancer related pain, Nausea    · Dexamethasone 4 MG Oral Tablet   Rx By: Elisa Chacon; Dispense: 15 Days ; #:20 Tablet;  Refill: 0; For: Cancer related pain, Nausea; BRYCE = N; [facility] To: [facility]    Discussion/Summary  Discussion Summary:   Pains/Arthralgias - Fentanyl 37mcg patch issued at patient request to titrate down due to moodiness  Understands that he needs to committ to this dose for 2 weeks with follow up afterwards  Some oxy script issued    May continue to use motrin if off steriods and other anti-inflammatories  On PPI daily  Lung cancer - encouraged not to delay decision making in regard to cancer care long  No decision is a decision not to treat and may shorten his life expectancy  Goals and Barriers: The patient has the current Goals: Pain control  The patent has the current Barriers: Copays  Patient's Capacity to Self-Care: Patient is able to Self-Care  Medication SE Review and Pt Understands Tx: Possible side effects of new medications were reviewed with the patient/guardian today  The treatment plan was reviewed with the patient/guardian  The patient/guardian understands and agrees with the treatment plan       Self Referrals:   Self Referrals: No      Chief Complaint  Chief Complaint Free Text Note Form: Pain and nausea      History of Present Illness  HPI: 35yoM with stage 4 lung cancer  At his last visit he was in severe pain  Today he continues to report that he does not know what is going on with his back  He is taking Fentanyl 50mcg, Oxy 10mg PRN, and motrin 600mg 3 to 4 a day  He reports that the dexamethasone helped but when he ran out his pain increased which is when he started using the motrin  He would like to reduce his fentanyl back down to 37mcg  He states that he and his family (wife & kids) have ongoing issues related to his moodiness which everyone blames on the opioid  He states that he is moving to the [employer] but will be keeping his medical providers  During his move his is delaying any cancer treatment decisions   He is contemplating recent recommendations to restart systemic therapy and/or consider radiation to his growing adrenal lesion  Review of Systems  Complete-Male:   Constitutional: No fever or chills, feels well, no tiredness, no recent weight gain or weight loss  Eyes: No complaints of eye pain, no red eyes, no discharge from eyes, no itchy eyes  ENT: mouth pain, but no complaints of earache, no hearing loss, no nosebleeds, no nasal discharge, no sore throat, no hoarseness  Cardiovascular: No complaints of slow heart rate, no fast heart rate, no chest pain, no palpitations, no leg claudication, no lower extremity  Respiratory: No complaints of shortness of breath, no wheezing, no cough, no SOB on exertion, no orthopnea or PND  Gastrointestinal: lack of appetite, but No complaints of abdominal pain, no constipation, no nausea or vomiting, no diarrhea or bloody stools  Genitourinary: No complaints of dysuria, no incontinence, no hesitancy, no nocturia, no genital lesion, no testicular pain  Musculoskeletal: arthralgias, limb pain and joint stiffness  Integumentary: No complaints of skin rash or skin lesions, no itching, no skin wound, no dry skin  Neurological: No compliants of headache, no confusion, no convulsions, no numbness or tingling, no dizziness or fainting, no limb weakness, no difficulty walking  Psychiatric: Is not suicidal, no sleep disturbances, no anxiety or depression, no change in personality, no emotional problems  Endocrine: No complaints of proptosis, no hot flashes, no muscle weakness, no erectile dysfunction, no deepening of the voice, no feelings of weakness  Hematologic/Lymphatic: No complaints of swollen glands, no swollen glands in the neck, does not bleed easily, no easy bruising  Active Problems    1  Adenocarcinoma of right lung (162 9) (C34 91)   2  Adrenal mass (255 9) (E27 9)   3  Anxiety (300 00) (F41 9)   4  Arthralgia, unspecified joint (719 40) (M25 50)   5  Cancer related pain (338 3) (G89 3)   6  Continuous opioid dependence (304 01) (F11 20)   7   Decreased appetite (783 0) (R63 0)   8  Decreased libido (799 81) (R68 82)   9  Depression (311) (F32 9)   10  GERD (gastroesophageal reflux disease) (530 81) (K21 9)   11  Insomnia (780 52) (G47 00)   12  Metastasis to adrenal gland (198 7) (C79 70)   13  Nausea (787 02) (R11 0)   14  Neuropathic pain (729 2) (M79 2)   15  Rheumatoid factor positive (795 79) (R76 8)   16  Under care of palliative care specialist (V66 7) (Z51 5)    Past Medical History    1  History of Chemotherapy-induced nausea (787 02,E933 1) (R11 0,T45  1X5A)   2  History of Epigastric pain (789 06) (R10 13)   3  History of gastritis (V12 79) (Z87 19)   4  History of Hospital discharge follow-up (V67 59) (Z09)   5  History of Subcutaneous nodule (782 2) (R22 9)   6  History of Swelling (782 3) (R60 9)    Family History  Mother    1  No pertinent family history  Father    2  No pertinent family history    Social History    · Current some day smoker (305 1) (F17 200)   · Five children   · Former consumption of alcohol (V11 3) (Z87 898)   ·    · Occupation  Social History Reviewed: The social history was reviewed and updated today  Current Meds   1  Dexamethasone 4 MG Oral Tablet; 1 tab PO bid x5 days then 1 tab PO daily; Therapy: 41JUI9555 to (Evaluate:10Aug2017)  Requested for: 86JWZ3226; Last   Rx:26Jul2017 Ordered   2  FentaNYL 50 MCG/HR Transdermal Patch 72 Hour; APPLY 1 PATCH EVERY 3 DAYS; Therapy: 35VOD9865 to (Evaluate:32Xua5781); Last Rx:26Jul2017 Ordered   3  Gabapentin 400 MG Oral Capsule; TAKE 1 CAPSULE AT BEDTIME; Therapy: 32EQM5634 to (Anna Fajardo)  Requested for: 80CIJ6035; Last   Rx:01Jun2017 Ordered   4  Mirtazapine 15 MG Oral Tablet; TAKE 1 TABLET AT BEDTIME; Therapy: (Recorded:06Qev0984) to Recorded   5  OxyCODONE HCl - 10 MG Oral Tablet; 1 tab PO every 4 hours as needed for   breatkthrough pain; Therapy: 73PXO5689 to (Evaluate:82Xak9146); Last Rx:76Uzt7660 Ordered   6   Pantoprazole Sodium 40 MG Oral Tablet Delayed Release; TAKE 1 TABLET DAILY 30   MINUTES BEFORE BREAKFAST; Therapy: 63QYO4492 to (Evaluate:70Lay7900)  Requested for: 87XVH1832; Last   Rx:60Xlg8435 Ordered   7  Viagra 50 MG Oral Tablet; 1 tablet 30 minutes before activity as needed; Therapy: 57EYS2484 to (Evaluate:23Igw5467)  Requested for: 78JPD5344; Last   Rx:50Ffq8104 Ordered  Medication List Reviewed: The medication list was reviewed and updated today  Allergies    1  Nicotine Transdermal System 21-14-7 MG/24HR KIT   2  Ondansetron HCl TABS    Vitals  Vital Signs    Recorded: 21Aug2017 09:32AM   Temperature 98 4 F   Heart Rate 94   Respiration 18   Systolic 602   Diastolic 80   Height 6 ft 3 in   Weight 186 lb    BMI Calculated 23 25   BSA Calculated 2 13   O2 Saturation 98   Pain Scale 8     Physical Exam    Constitutional   General appearance: No acute distress, well appearing and well nourished   calm  Eyes   Conjunctiva and lids: No swelling, erythema, or discharge  Pupils and irises: Equal, round and reactive to light  Ears, Nose, Mouth, and Throat   External inspection of ears and nose: Normal     Nasal mucosa, septum, and turbinates: Normal without edema or erythema  Oropharynx: Normal with no erythema, edema, exudate or lesions  Pulmonary   Respiratory effort: No increased work of breathing or signs of respiratory distress  Cardiovascular   Examination of extremities for edema and/or varicosities: Normal     Abdomen   Abdomen: Non-tender, no masses  Musculoskeletal   Gait and station: Abnormal   walks with a cane  Neurologic   Cranial nerves: Cranial nerves 2-12 intact  Psychiatric   Orientation to person, place and time: Normal     Mood and affect: Normal          Future Appointments    Date/Time Provider Specialty Site   08/30/2017 01:20 PM YANCY Wrihgt  Palliative Care [facility]     Signatures   Electronically signed by :  YANCY Sandy ; Aug 21 2017 12:31PM EST (Author)

## 2018-01-11 NOTE — MISCELLANEOUS
Message   Recorded as Task   Date: 11/13/2017 09:27 AM, Created By: Sherie Root   Task Name: Call Back   Assigned To: Christelle Gu   Regarding Patient: Kayden Cordova, Status: Active   Comment:    Sherie Doing - 13 Nov 2017 9:27 AM     TASK CREATED  kimberlyn called on behalf of pt, stating pt has swelling and pain in feet as well as S O B  i advised her to have pt to go right to the ER kimberlyn asked that you call her at    Spoke with patient - He explained that he developed bilateral lower leg edema  He said this is getting a little better with elevation but swelling is still there  Pain is being managed by palliative care - he will touch base with Dr Kylee Rainey office regarding medication  He does get SOB with ambulation  Discussed with Dr Bryn Shields - patient will be going to the out patient today to have D-dimer drawn  He will follow up with me later this afternoon for the results  Active Problems    1  Adenocarcinoma of right lung (162 9) (C34 91)   2  Adrenal mass (255 9) (E27 9)   3  Anxiety (300 00) (F41 9)   4  Arthralgia, unspecified joint (719 40) (M25 50)   5  Cancer related pain (338 3) (G89 3)   6  Constipation (564 00) (K59 00)   7  Continuous opioid dependence (304 01) (F11 20)   8  Decreased appetite (783 0) (R63 0)   9  Decreased libido (799 81) (R68 82)   10  Depression (311) (F32 9)   11  GERD (gastroesophageal reflux disease) (530 81) (K21 9)   12  Insomnia (780 52) (G47 00)   13  Irritable mood (799 22) (R45 4)   14  Metastasis to adrenal gland (198 7) (C79 70)   15  Nausea (787 02) (R11 0)   16  Neuropathic pain (729 2) (M79 2)   17  Rheumatoid factor positive (795 79) (R76 8)   18  Swelling (782 3) (R60 9)   19  Under care of palliative care specialist (V66 7) (Z51 5)    Current Meds   1  FentaNYL 12 MCG/HR Transdermal Patch 72 Hour; APPLY 1 PATCH EVERY 3 DAYS; Therapy: 41GUW5358 to (Evaluate:08Nov2017); Last Rx:09Oct2017 Ordered   2   FentaNYL 25 MCG/HR Transdermal Patch 72 Hour; APPLY 1 PATCH EVERY 3 DAYS; Therapy: 51YKP7635 to (Evaluate:08Nov2017); Last Rx:09Oct2017 Ordered   3  Gabapentin 400 MG Oral Capsule; TAKE 1 CAPSULE AT BEDTIME; Therapy: 18ISW2954 to ([facility])  Requested for: 03MUY0277; Last   Rx:01Jun2017 Ordered   4  Lactulose 20 GM/30ML Oral Solution; 15-30 mL by mouth daily  May take up to 3 extra   doses as needed to prevent constipation; Therapy: 58WPS4580 to ([phone])  Requested for: 96EHA0230; Last   Rx:09Oct2017 Ordered   5  Mirtazapine 15 MG Oral Tablet; TAKE 1 TABLET AT BEDTIME; Therapy: (Recorded:59Chc7429) to Recorded   6  Naproxen TABS; Therapy: (SYJNSVUF:02AUC2817) to Recorded   7  Nicotine 21 MG/24HR Transdermal Patch 24 Hour; APPLY 1 PATCH Daily; Therapy: 67Gdb6353 to (Evaluate:13Oct2017)  Requested for: 47Uno7405; Last   Rx:13Sep2017 Ordered   8  OxyCODONE HCl - 15 MG Oral Tablet; TAKE 1 TABLET EVERY 4 HOURS AS NEEDED; Therapy: 39CGT3211 to (Evaluate:08Nov2017); Last Rx:09Oct2017 Ordered   9  Pantoprazole Sodium 40 MG Oral Tablet Delayed Release (Protonix); TAKE 1 TABLET   DAILY 30 MINUTES BEFORE BREAKFAST; Therapy: 93MIJ0125 to (Evaluate:66Srg4304)  Requested for: 14CYZ2232; Last   Rx:29Jun2017 Ordered    Allergies    1  Nicotine Transdermal System 21-14-7 MG/24HR KIT   2   Ondansetron HCl TABS    Signatures   Electronically signed by : Мария Keith, ; Nov 13 2017 10:01AM EST                       (Author)

## 2018-01-11 NOTE — MISCELLANEOUS
Message  CALLED PT TO R/S MISSED APPT FOR TODAY AND PT SAID THAT HE DID NOT KNOW ABOUT THE APPT  HE STATED THAT HE IS MOVING AND HE WILL CALL BACK NEXT WEEK TO R/S  Active Problems    1  Acute pain (338 19) (R52)   2  Adenocarcinoma of right lung (162 9) (C34 91)   3  Adrenal mass (255 9) (E27 9)   4  Anxiety (300 00) (F41 9)   5  Chemotherapy-induced nausea (787 02,E933 1) (R11 0,T45  1X5A)   6  Epigastric pain (789 06) (R10 13)   7  Gastritis (535 50) (K29 70)   8  Hospital discharge follow-up (V67 59) (Z09)    Current Meds   1  LORazepam 0 5 MG Oral Tablet; take 1-2 tabs PO Q 4-6 hours PRN; Therapy: 87ZXF9653 to (Last Rx:14Jan2016) Ordered   2  Morphine Sulfate ER 15 MG Oral Tablet Extended Release; One tab PO every 12 hours; Therapy: 02FJB9840 to (Evaluate:04Feb2016); Last Rx:20Jan2016 Ordered   3  Omeprazole 40 MG Oral Capsule Delayed Release; TAKE 1 CAPSULE DAILY; Therapy: 34AMU4044 to (Aubrey Reasons)  Requested for: 27HLS9736; Last   Rx:06Jan2016 Ordered   4  Ondansetron 4 MG Oral Tablet Dispersible; TAKE 1 TABLET Every 6-8 hours PRN   NAUSEA MAY CAUSE MILD DROWSINESS; Therapy: 79ONW7263 to (Last Rx:06Jan2016)  Requested for: 64GVB1285 Ordered   5  OxyCODONE HCl - 5 MG Oral Tablet; TAKE 1 TO 2 TABLETS EVERY 4 HOURS AS   NEEDED FOR PAIN;   Therapy: 20UAH7277 to (Evaluate:10Mar2016); Last Rx:16Rap9741 Ordered    Allergies    1  No Known Drug Allergies    Signatures   Electronically signed by :  Concepcion Scanlon, ; Feb 25 2016  2:35PM EST                       (Author)

## 2018-01-11 NOTE — PROGRESS NOTES
Assessment    1  Adenocarcinoma of right lung (162 9) (C34 91)   2  Rheumatoid factor positive (795 79) (R76 8)   3  Adrenal mass (255 9) (E27 9)    Plan  Adrenal mass    · Drink plenty of fluids ; Status:Complete;   Done: 54CDS4642   Ordered; For:Adrenal mass; Ordered By:Belman, Alveda Sandifer;   · Follow-up visit in 1 month Evaluation and Treatment  Follow-up  Status: Hold For -  Scheduling  Requested for: 21Jun2016   Ordered; For: Adrenal mass; Ordered By: Bhupinder Durham Performed:  Due: 07JIP1683   · (1) CBC/PLT/DIFF; Status:Active; Requested YLS:11FQQ5723;    Perform:[facility]; QPN:99XFW4959;SJJTVKW; For:Adrenal mass; Ordered By:Belman, Alveda Sandifer;   · (1) COMPREHENSIVE METABOLIC PANEL; Status:Active; Requested NBC:89SIC1149;    Perform:[facility]; UTH:78AKF8643;WUYJMHE; For:Adrenal mass; Ordered By:Belman, Alveda Sandifer;   · * CT CHEST ABDOMEN PELVIS W CONTRAST; Status:Need Information - Financial  Authorization; Requested TUD:[id number];    Perform:[facility]; HHU:87CPR9022;OSNBPNA; For:Adrenal mass; Ordered By:Belman, Alveda Sandifer;    Discussion/Summary  Discussion Summary:   In summary, this is a 38-year-old male history of adenocarcinoma of the lung with adrenal metastases as outlined  He had treatment with immunotherapy  This was complicated by rheumatoid arthritis  Report has been made to the study coordinators and [address]  Fortunately, his symptoms have entirely resolved, as have his subcutaneous rheumatoid nodules  Restaging seems warranted given his last scan in early April  If his cancer is stable or slightly improved  Observation may be the best route forward  We reviewed some of the theoretical concepts to support this recommendation  Retreatment with Charis Space could be considered upon disease progression, although currently, there are no data to guide decision making this regard  Again, conceptually, this seems reasonable   Cytotoxic chemotherapy remains a further option beyond that point  Patient reports that he has occasional hematochezia over the past 2 or 3 days  He has no pain with stool passage  I suspect that he has hemorrhoids  I do not believe this represents colitis  I suggested that if this symptom is persistent over the next week or so, he should call for evaluation  Additionally, if diarrhea or abdominal pain develop a phone call would be warranted  The patient voiced understanding and agreement with the above plan  I'll see him back in a month or so after he has his CAT scan and blood work  Medication SE Review and Pt Understands Tx: Possible side effects of new medications were reviewed with the patient/guardian today  The treatment plan was reviewed with the patient/guardian  The patient/guardian understands and agrees with the treatment plan   Understands and agrees with treatment plan: The treatment plan was reviewed with the patient/guardian  The patient/guardian understands and agrees with the treatment plan   Counseling Documentation With Imm: The patient was counseled regarding diagnostic results, instructions for management, prognosis, patient and family education, impressions  total time of encounter was 40 minutes  Chief Complaint  Chief Complaint Free Text Note Form: Follow-up regarding lung cancer  History of Present Illness  HPI: September 2015- patient presented with worsening right scapular pain over 3-4 weeks  CT chest showed a 16 x 13 x 12 cm lesion in the right upper lobe extending to the hilum  Indeterminate right adrenal invasion measuring 2 5 cm is noted  Biopsy of the pulmonary mass Showed a poorly differentiated carcinoma, TTF-1 and CK 7 positive  P  40, negative  Ultimately this was felt to represent a poorly differentiated adenocarcinoma  April 2016- CT showed continued left upper lobe mass and 4 x 4 0 5 cm compared with the prior study of December, 12 5 x 5 5 cm  Patient was started on Opdivo 3 mg/kg IV every 2 weeks    May 2016-patient developed significant bilateral hand arthropathy with erythema, swelling  Rheumatoid factor strongly positive  Opdivo suspended  Steroids therapy initiated  Previous Therapy:   October 2015 - He was on VX39691, but was taken off study for noncompliance he had more than 6 weeks of no shows  Cycle # 3 was 1/8/16  XHPR7996N 1200mg and Carboplatin AUC 6 with Neulasta support day 2  April 2016- Patient was started on Opdivo 3 mg/kg IV every 2 weeks  Interval History: Hands a lot better  Stopped decadron about 1 week ago  No allergy sx  NP Cough mostly when recumbent  For a few weeks  Stable  No fever  Review of Systems  Complete-Male:   Constitutional: No fever or chills, feels well, no tiredness, no recent weight gain or weight loss  Eyes: No complaints of eye pain, no red eyes, no discharge from eyes, no itchy eyes  ENT: no complaints of earache, no hearing loss, no nosebleeds, no nasal discharge, no sore throat, no hoarseness  Cardiovascular: No complaints of slow heart rate, no fast heart rate, no chest pain, no palpitations, no leg claudication, no lower extremity  Respiratory: No complaints of shortness of breath, no wheezing, no cough, no SOB on exertion, no orthopnea or PND  Gastrointestinal: No complaints of abdominal pain, no constipation, no nausea or vomiting, no diarrhea or bloody stools  Genitourinary: No complaints of dysuria, no incontinence, no hesitancy, no nocturia, no genital lesion, no testicular pain  Musculoskeletal: as noted in HPI  Integumentary: No complaints of skin rash or skin lesions, no itching, no skin wound, no dry skin  Neurological: No compliants of headache, no confusion, no convulsions, no numbness or tingling, no dizziness or fainting, no limb weakness, no difficulty walking  Psychiatric: Is not suicidal, no sleep disturbances, no anxiety or depression, no change in personality, no emotional problems     Endocrine: No complaints of proptosis, no hot flashes, no muscle weakness, no erectile dysfunction, no deepening of the voice, no feelings of weakness  Hematologic/Lymphatic: No complaints of swollen glands, no swollen glands in the neck, does not bleed easily, no easy bruising  Active Problems    1  Adenocarcinoma of right lung (162 9) (C34 91)   2  Adrenal mass (255 9) (E27 9)   3  Anxiety (300 00) (F41 9)   4  Arthralgia, unspecified joint (719 40) (M25 50)   5  Cancer related pain (338 3) (G89 3)   6  Chemotherapy-induced nausea (787 02,E933 1) (R11 0,T45  1X5A)   7  Rheumatoid factor positive (795 79) (R76 8)   8  Subcutaneous nodule (782 2) (R22 9)   9  Swelling (782 3) (R60 9)    Past Medical History    1  History of Epigastric pain (789 06) (R10 13)   2  History of gastritis (V12 79) (Z87 19)   3  History of Hospital discharge follow-up (V67 59) (Z0)    Family History  Mother    1  No pertinent family history  Father    2  No pertinent family history    Social History    · Current some day smoker (305 1) (F17 210)   · Five children   · Former consumption of alcohol (V11 3) (Z87 898)   ·    · Occupation    Current Meds   1  FentaNYL 12 MCG/HR Transdermal Patch 72 Hour; APPLY 1 PATCH EVERY 3 DAYS; Therapy: 27Apr2016 to (Evaluate:28Jun2016); Last Rx:13Jun2016 Ordered   2  Omeprazole 20 MG Oral Capsule Delayed Release; TAKE 1 CAPSULE DAILY 30 minutes   before eating; Therapy: 27Apr2016 to (Evaluate:10Aug2016)  Requested for: 17JPE9335; Last   Rx:90Euu2800 Ordered   3  Opdivo 100 MG/10ML Intravenous Solution; Therapy: 27Apr2016 to Recorded   4  OxyCODONE HCl - 10 MG Oral Tablet; TAKE 1 TABLET EVERY 4 HOURS AS NEEDED   FOR BREAKTHROUGH PAIN;   Therapy: 35EDP1406 to (Evaluate:49Zzg7129); Last Rx:13Jun2016 Ordered  Medication List Reviewed: The medication list was reviewed and updated today  Allergies    1   Ondansetron HCl TABS    Vitals  Vital Signs [Data Includes: Current Encounter]    Recorded: 21Jun2016 08:15AM   Temperature 97 3 F   Heart Rate 73   Respiration 16   Systolic 686   Diastolic 78   Height 6 ft 3 5 in   Weight 177 lb    BMI Calculated 21 83   BSA Calculated 2 09   O2 Saturation 98   Pain Scale 0     Physical Exam    Constitutional   General appearance: No acute distress, well appearing and well nourished  Eyes   Conjunctiva and lids: No swelling, erythema, or discharge  Ears, Nose, Mouth, and Throat   External inspection of ears and nose: Normal     Oropharynx: Normal with no erythema, edema, exudate or lesions  Pulmonary   Auscultation of lungs: Clear to auscultation, equal breath sounds bilaterally, no wheezes, no rales, no rhonci  Cardiovascular   Auscultation of heart: Normal rate and rhythm, normal S1 and S2, without murmurs  Examination of extremities for edema and/or varicosities: Normal     Abdomen   Abdomen: Non-tender, no masses  Liver and spleen: No hepatomegaly or splenomegaly  Lymphatic   Palpation of lymph nodes in neck: No lymphadenopathy  Musculoskeletal   Gait and station: Normal     Skin   Skin and subcutaneous tissue: Normal without rashes or lesions  Neurologic   Cranial nerves: Cranial nerves 2-12 intact  Psychiatric   Orientation to person, place and time: Normal          Future Appointments    Date/Time Provider Specialty Site   06/27/2016 08:20 AM YANCY Barrett   [address]     Signatures   Electronically signed by : YANCY Stinson DOM D ,DO; Jun 21 2016  8:34AM EST                       (Author)

## 2018-01-11 NOTE — MISCELLANEOUS
Message   Recorded as Task   Date: 06/09/2016 01:22 PM, Created By: Daniele Ho   Task Name: Med Renewal Request   Assigned To: Wendy Pearce   Regarding Patient: Beth Das, Status: Active   Comment:    Anjelica Elaine - 09 Jun 2016 1:22 PM     TASK CREATED  Caller: Chapin Carter, Other; Renew Medication  Smitha May called and requested scripts for his Oxycodone and Fentanyl  He was made aware he was a no show with you on 6/2/16  I made him an appointment to see you this Monday 6/13/16  Can he stop tomorrow or this afternoon at [facility] ? He states he wont make it till Monday  Since starting steriods he has been able to cut down on his oxycodone usage  His now using just 1 tablet a day but he continues to wear the fentanyl 50mcg patch  He states that his arthritis pain has eased up but that he stopped taking the dexamethasone last week because he thinks he was starting to have a reaction  He needs to change his patch tomorrow and only has 2 oxy tablets left  Plan: With the addition of steroids will try to reduce his sustained release opioid from fentanyl 50mcg to 25mcg  Will issue some oxy 10mg tablets and reassess his needs when he is seen on Monday  Plan  Arthralgia, unspecified joint, Cancer related pain    · From  FentaNYL 50 MCG/HR Transdermal Patch 72 Hour APPLY 1 PATCH  EVERY 3 DAYS  Switch back down to the 25mcg patch if steriods are started  To FentaNYL 25 MCG/HR Transdermal Patch 72 Hour APPLY 1 PATCH EVERY 3 DAYS   · OxyCODONE HCl - 10 MG Oral Tablet; TAKE 1 TABLET EVERY 4 HOURS AS  NEEDED FOR BREAKTHROUGH PAIN    Signatures   Electronically signed by :  YANCY Gtz ; Jun 9 2016  1:47PM EST                       (Author)

## 2018-01-11 NOTE — MISCELLANEOUS
History of Present Illness  TCM Communication [facility]: He was hospitalized at [facility]  The dates of hospitalization: 7/21-7/22, date of admission: 7/21, date of discharge: 7/22  Diagnosis: Back pain  He was discharged to home  Medications reviewed and updated today  He scheduled a follow up appointment  Follow-up appointments with other specialists: Dr Jazzmine Phillips 7/27  The patient is currently asymptomatic  Counseling was provided to the patient  Communication performed and completed by [facility] 7/25/17      Active Problems     1  Anxiety (300 00) (F41 9)   2  Arthralgia, unspecified joint (719 40) (M25 50)   3  Continuous opioid dependence (304 01) (F11 20)   4  Decreased libido (799 81) (R68 82)   5  Depression (311) (F32 9)   6  GERD (gastroesophageal reflux disease) (530 81) (K21 9)   7  Insomnia (780 52) (G47 00)   8  Metastasis to adrenal gland (198 7) (C79 70)   9  Neuropathic pain (729 2) (M79 2)   10  Rheumatoid factor positive (795 79) (R76 8)   11  Under care of palliative care specialist (V66 7) (Z51 5)    Adenocarcinoma of right lung (162 9) (C34 91)       Adrenal mass (255 9) (E27 9)       Cancer related pain (338 3) (G89 3)       Nausea (787 02) (R11 0)       Decreased appetite (783 0) (R63 0)          Past Medical History    1  History of Chemotherapy-induced nausea (787 02,E933 1) (R11 0,T45  1X5A)   2  History of Epigastric pain (789 06) (R10 13)   3  History of gastritis (V12 79) (Z87 19)   4  History of Hospital discharge follow-up (V67 59) (Z09)   5  History of Subcutaneous nodule (782 2) (R22 9)   6  History of Swelling (782 3) (R60 9)    Family History  Mother    1  No pertinent family history  Father    2  No pertinent family history    Social History    · Current some day smoker (305 1) (F17 200)   · Five children   · Former consumption of alcohol (V11 3) (Z87 898)   ·    · Occupation    Current Meds   1   FentaNYL 50 MCG/HR Transdermal Patch 72 Hour; APPLY 1 PATCH EVERY 3 DAYS; Therapy: 57CKV7725 to (Evaluate:98Fzh1611); Last Rx:29Jun2017 Ordered   2  Gabapentin 400 MG Oral Capsule; TAKE 1 CAPSULE AT BEDTIME; Therapy: 15MLD3593 to (Samson Tyra)  Requested for: 19ZMX8571; Last   Rx:01Jun2017 Ordered   3  Ibuprofen 600 MG Oral Tablet; take 1 tablet by mouth every 8 hours as needed for pain; Therapy: 78LXB4739 to (Evaluate:71Pde4966)  Requested for: 11WRI2057; Last   Rx:29Jun2017 Ordered   4  OxyCODONE HCl - 10 MG Oral Tablet; 1 tab PO every 4 hours as needed for   breatkthrough pain; Therapy: 36QBV1989 to (Evaluate:86Cgg4258); Last Rx:29Jun2017 Ordered   5  Pantoprazole Sodium 40 MG Oral Tablet Delayed Release; TAKE 1 TABLET DAILY 30   MINUTES BEFORE BREAKFAST; Therapy: 60GDP4789 to (Evaluate:81Yal9125)  Requested for: 51RFB6637; Last   Rx:29Jun2017 Ordered   6  Viagra 50 MG Oral Tablet; 1 tablet 30 minutes before activity as needed; Therapy: 30VWZ8290 to (Evaluate:60Fud6147)  Requested for: 84CSC8468; Last   Rx:26Nsi1782 Ordered    Allergies    1  Nicotine Transdermal System 21-14-7 MG/24HR KIT   2  Ondansetron HCl TABS    Future Appointments    Date/Time Provider Specialty Site   08/03/2017 11:45 AM YANCY Marquis , DO, [facility] Hematology Oncology [address] [place] [address] ONCOLOGY   08/08/2017 01:40 PM YANCY Winston   [address]     Signatures   Electronically signed by : Julisa Hernández DO; Jul 31 2017 12:05PM EST                       (Author)

## 2018-01-12 VITALS
DIASTOLIC BLOOD PRESSURE: 64 MMHG | HEART RATE: 88 BPM | WEIGHT: 178 LBS | HEIGHT: 75 IN | BODY MASS INDEX: 22.13 KG/M2 | TEMPERATURE: 98.8 F | RESPIRATION RATE: 18 BRPM | SYSTOLIC BLOOD PRESSURE: 128 MMHG

## 2018-01-12 VITALS
RESPIRATION RATE: 20 BRPM | HEART RATE: 105 BPM | WEIGHT: 200.2 LBS | SYSTOLIC BLOOD PRESSURE: 106 MMHG | DIASTOLIC BLOOD PRESSURE: 62 MMHG | OXYGEN SATURATION: 92 % | HEIGHT: 75 IN | BODY MASS INDEX: 24.89 KG/M2 | TEMPERATURE: 97.7 F

## 2018-01-12 VITALS
TEMPERATURE: 97 F | DIASTOLIC BLOOD PRESSURE: 80 MMHG | HEART RATE: 76 BPM | OXYGEN SATURATION: 99 % | RESPIRATION RATE: 18 BRPM | SYSTOLIC BLOOD PRESSURE: 140 MMHG

## 2018-01-12 NOTE — MISCELLANEOUS
Message   Recorded as Task   Date: 10/03/2017 10:05 AM, Created By: Hannah Oden   Task Name: Call Back   Assigned To: Araseli Gomez   Regarding Patient: Luiz Milian, Status: Active   CommentMacario Grejayce - 03 Oct 2017 10:05 AM     TASK CREATED  Patient called wants Dr Gem Roth to know he wants to restart his immuno therapy again  Said Dr  is aware that his cancer is back  Asking does he have to come in to see Dr  or can he just restart treatment  Looked for an opening do not see one in [place] office until end of October  Pt was in ER 10/1/17 , had CXR at that time  Cris Josue - 09 Oct 2017 10:14 AM     TASK REPLIED TO: Previously Assigned To Cris Josue  called pt 10/5 and 10/9, no answer left 2 voicemails to call back        Active Problems    1  Adenocarcinoma of right lung (162 9) (C34 91)   2  Adrenal mass (255 9) (E27 9)   3  Anxiety (300 00) (F41 9)   4  Arthralgia, unspecified joint (719 40) (M25 50)   5  Cancer related pain (338 3) (G89 3)   6  Constipation (564 00) (K59 00)   7  Continuous opioid dependence (304 01) (F11 20)   8  Decreased appetite (783 0) (R63 0)   9  Decreased libido (799 81) (R68 82)   10  Depression (311) (F32 9)   11  GERD (gastroesophageal reflux disease) (530 81) (K21 9)   12  Insomnia (780 52) (G47 00)   13  Irritable mood (799 22) (R45 4)   14  Metastasis to adrenal gland (198 7) (C79 70)   15  Nausea (787 02) (R11 0)   16  Neuropathic pain (729 2) (M79 2)   17  Rheumatoid factor positive (795 79) (R76 8)   18  Under care of palliative care specialist (V66 7) (Z51 5)    Current Meds   1  FentaNYL 12 MCG/HR Transdermal Patch 72 Hour; APPLY 1 PATCH EVERY 3 DAYS; Therapy: 02FDC3731 to (Evaluate:08Nov2017); Last Rx:09Oct2017 Ordered   2  FentaNYL 25 MCG/HR Transdermal Patch 72 Hour; APPLY 1 PATCH EVERY 3 DAYS; Therapy: 95GJC5743 to (Evaluate:08Nov2017); Last Rx:09Oct2017 Ordered   3  Gabapentin 400 MG Oral Capsule; TAKE 1 CAPSULE AT BEDTIME;    Therapy: 11OBA2070 to (Earlene Meyers)  Requested for: 97ZWL7346; Last   PF:23SRF9182 Ordered   4  Ibuprofen 600 MG Oral Tablet; take 1 tablet by mouth every 8 hours as needed for pain; Therapy: 22Ydr4284 to (Evaluate:19Nov2017)  Requested for: 34Rue6929; Last   Rx:20Jof2125 Ordered   5  Lactulose 20 GM/30ML Oral Solution; 15-30 mL by mouth daily  May take up to 3 extra   doses as needed to prevent constipation; Therapy: 82ANZ5652 to (22 195147)  Requested for: 58REN0396; Last   Rx:09Oct2017 Ordered   6  Mirtazapine 15 MG Oral Tablet; TAKE 1 TABLET AT BEDTIME; Therapy: (Recorded:94Mrz9753) to Recorded   7  Nicotine 21 MG/24HR Transdermal Patch 24 Hour; APPLY 1 PATCH Daily; Therapy: 03Esj2854 to (Evaluate:13Oct2017)  Requested for: 68Low3798; Last   Rx:97Gwj8525 Ordered   8  OxyCODONE HCl - 15 MG Oral Tablet; TAKE 1 TABLET EVERY 4 HOURS AS NEEDED; Therapy: 27IHA8599 to (Evaluate:08Nov2017); Last Rx:09Oct2017 Ordered   9  Pantoprazole Sodium 40 MG Oral Tablet Delayed Release; TAKE 1 TABLET DAILY 30   MINUTES BEFORE BREAKFAST; Therapy: 60QAQ0777 to (Evaluate:23Zng5033)  Requested for: 77GVT2298; Last   Rx:66Qhl9913 Ordered    Allergies    1  Nicotine Transdermal System 21-14-7 MG/24HR KIT   2   Ondansetron HCl TABS    Signatures   Electronically signed by : Rajan Denton, ; Oct 17 2017  1:55PM EST                       (Author)

## 2018-01-12 NOTE — MISCELLANEOUS
Message   Recorded as Task   Date: 05/11/2016 09:15 AM, Created By: Monet Rivera   Task Name: Call Back   Assigned To: Rema Obrien   Regarding Patient: Fawad Pillai, Status: Active   Comment:    Monet Aydes - 11 May 2016 9:15 AM     TASK CREATED  Caller: Self; [phone] (Home); [phone] (Work)  Pt states he has appointment tomorrow, but needs Fentanyl patches and won't have anything to hold over until then  Would like to know if you can write up an earlier script  Please advise  He has an appointment tomorrow but needs to change his fentanyl patch today  He does feel that it is helping because when he forgot to change it one time he felt the effects  However he continues to have pain and continues to need to take the oxycodone 10mg around the clock every 4 hours  He forgot about his med/onc appt with Dr Gregory Cushing on Monday and he also forgot to get his labs done  However if he picks up the script today his plan is to go to the lab at the same time  He also reports that his wife will be at his appointment with him tomorrow  She has concerns about mood swings  Plan: Will  fentanyl 25mcg patches today but his PRN oxycodone use would suggest that a titration to 50mcg may be more effective at reducing his PRN oxycodone use  He will wear 2 patches of the 25mcg patch until he sees me tomorrow and we can more full address everyone's concerns  I strongly encouraged him to get his lab work done as it may change both his cancer treatment and pain management therapy  Plan  Arthralgia, unspecified joint, Cancer related pain    · From  FentaNYL 25 MCG/HR Transdermal Patch 72 Hour APPLY 1 PATCH  EVERY 3 DAYS To FentaNYL 25 MCG/HR Transdermal Patch 72 Hour APPLY 1-2  PATCH EVERY 3 DAYS as instructed    Signatures   Electronically signed by :  YANCY Girard ; May 11 2016  1:08PM EST                       (Author)

## 2018-01-12 NOTE — MISCELLANEOUS
Message   Recorded as Task   Date: 12/08/2016 12:50 PM, Created By: Josi Ko   Task Name: Follow Up   Assigned To: Rema Obrien   Regarding Patient: Cyrus Bernardo, Status: Active   CommentLonita Lather - 08 Dec 2016 12:50 PM     TASK CREATED  Caller: patient, Other; Amy 39 called and thought about the Jeaneen Dakin and asked if you would call it into his pharmacy  Plan  Decreased libido    · Viagra 50 MG Oral Tablet; 1 tablet 30 minutes before activity as needed    Signatures   Electronically signed by :  YANCY Presley ; Dec  8 2016  1:07PM EST                       (Author)

## 2018-01-13 VITALS
TEMPERATURE: 97.6 F | WEIGHT: 188.31 LBS | HEIGHT: 75 IN | BODY MASS INDEX: 23.41 KG/M2 | HEART RATE: 68 BPM | OXYGEN SATURATION: 97 % | DIASTOLIC BLOOD PRESSURE: 72 MMHG | RESPIRATION RATE: 18 BRPM | SYSTOLIC BLOOD PRESSURE: 120 MMHG

## 2018-01-13 VITALS
DIASTOLIC BLOOD PRESSURE: 78 MMHG | OXYGEN SATURATION: 98 % | HEIGHT: 75 IN | BODY MASS INDEX: 22.26 KG/M2 | SYSTOLIC BLOOD PRESSURE: 122 MMHG | WEIGHT: 179 LBS | TEMPERATURE: 97.8 F | RESPIRATION RATE: 16 BRPM | HEART RATE: 96 BPM

## 2018-01-13 NOTE — MISCELLANEOUS
Message   Recorded as Task   Date: 01/20/2017 11:48 AM, Created By: Gamal Lu   Task Name: Intake   Assigned To: Rema Obrien   Regarding Patient: Neal Junior, Status: Active   Comment:    Anjelica Elaine - 20 Jan 2017 11:48 AM     TASK CREATED  Caller: Jaymie Fifi; Other  Patient called to let you know he feels he needs to go back on the 50 fentanyl patch the lesser dosage is not working  Hes aware you will be addressing this Monday 1/23/17  Rema Obrien - 23 Jan 2017 12:38 PM     TASK REPLIED TO: Previously Assigned To Rema Obrien  No answer at home number [phone]  Feels he was doing better on Fentanyl 50mcg + oxy 10mg  He stopped taking etodalac when he started gabapentin  Overall he is frustrated  He understands his lung cancer shrunk but the adrenal lesion did not  He does not understand why the adrenal biopsy is being requested again  He was supposed to have one earlier in his treatment plan and went to the appointment but the procedure was cancelled because the doctor felt it was too risky  He remembers her saying, "I could do it but I'm not going to be a cowboy "    In general he does not want to do more chemotherapy  He has felt horrible on it and would rather have his pain controlled  He also does not want to be cut open  He has not done radiation as part of his treatment plan so don't know his feelings on that  I have told Jaymie Calix that he is young and so his cancer team certainly wants to help him find an effective therapy that he can tolerate that would prolong his life at good quality  If he does not want to consider any more disease directed treatments, close contact with palliative care and a focus on his comfort is the alternative  He appreciates all that and would like to find something that would help his cancer  He would do more chemo if it did not make him so sick       Plan:  I will start him on low dose prednisone for his pain thinking that it is more rheumatologic and inflammatory in nature  I will attempt to reach out to Dr Nakia Douglas to let him know some of Fili's concerns about the biopsy  Plan  Cancer related pain, Decreased appetite, Nausea, Rheumatoid factor positive    · PredniSONE 10 MG Oral Tablet; Take 1 tablet daily    Signatures   Electronically signed by :  YANCY Hua ; Jan 23 2017  1:05PM EST                       (Author)

## 2018-01-14 VITALS
SYSTOLIC BLOOD PRESSURE: 120 MMHG | DIASTOLIC BLOOD PRESSURE: 78 MMHG | OXYGEN SATURATION: 97 % | RESPIRATION RATE: 18 BRPM | TEMPERATURE: 98.2 F | HEIGHT: 75 IN | HEART RATE: 80 BPM | BODY MASS INDEX: 22.17 KG/M2 | WEIGHT: 178.31 LBS

## 2018-01-14 VITALS
TEMPERATURE: 98.2 F | DIASTOLIC BLOOD PRESSURE: 80 MMHG | HEART RATE: 78 BPM | RESPIRATION RATE: 18 BRPM | BODY MASS INDEX: 23.62 KG/M2 | SYSTOLIC BLOOD PRESSURE: 130 MMHG | WEIGHT: 190 LBS | HEIGHT: 75 IN | OXYGEN SATURATION: 98 %

## 2018-01-14 VITALS
TEMPERATURE: 98.1 F | RESPIRATION RATE: 15 BRPM | DIASTOLIC BLOOD PRESSURE: 78 MMHG | WEIGHT: 178 LBS | BODY MASS INDEX: 22.13 KG/M2 | SYSTOLIC BLOOD PRESSURE: 138 MMHG | HEART RATE: 104 BPM | OXYGEN SATURATION: 98 % | HEIGHT: 75 IN

## 2018-01-14 VITALS
BODY MASS INDEX: 22.88 KG/M2 | OXYGEN SATURATION: 98 % | WEIGHT: 184 LBS | RESPIRATION RATE: 18 BRPM | DIASTOLIC BLOOD PRESSURE: 72 MMHG | TEMPERATURE: 99 F | HEIGHT: 75 IN | HEART RATE: 88 BPM | SYSTOLIC BLOOD PRESSURE: 120 MMHG

## 2018-01-14 VITALS
WEIGHT: 186 LBS | RESPIRATION RATE: 18 BRPM | TEMPERATURE: 98.4 F | OXYGEN SATURATION: 98 % | SYSTOLIC BLOOD PRESSURE: 110 MMHG | HEIGHT: 75 IN | DIASTOLIC BLOOD PRESSURE: 80 MMHG | BODY MASS INDEX: 23.13 KG/M2 | HEART RATE: 94 BPM

## 2018-01-14 VITALS
OXYGEN SATURATION: 88 % | SYSTOLIC BLOOD PRESSURE: 122 MMHG | WEIGHT: 201 LBS | HEART RATE: 123 BPM | RESPIRATION RATE: 18 BRPM | BODY MASS INDEX: 24.99 KG/M2 | DIASTOLIC BLOOD PRESSURE: 70 MMHG | HEIGHT: 75 IN

## 2018-01-14 VITALS
DIASTOLIC BLOOD PRESSURE: 70 MMHG | TEMPERATURE: 97.2 F | BODY MASS INDEX: 21.68 KG/M2 | WEIGHT: 174.38 LBS | HEIGHT: 75 IN | HEART RATE: 83 BPM | OXYGEN SATURATION: 98 % | SYSTOLIC BLOOD PRESSURE: 132 MMHG | RESPIRATION RATE: 16 BRPM

## 2018-01-14 VITALS
HEART RATE: 67 BPM | TEMPERATURE: 97.3 F | SYSTOLIC BLOOD PRESSURE: 122 MMHG | RESPIRATION RATE: 18 BRPM | WEIGHT: 184.31 LBS | HEIGHT: 75 IN | OXYGEN SATURATION: 97 % | DIASTOLIC BLOOD PRESSURE: 76 MMHG | BODY MASS INDEX: 22.92 KG/M2

## 2018-01-14 NOTE — MISCELLANEOUS
Message   Recorded as Task   Date: 07/26/2016 02:42 PM, Created By: Antione Barron   Task Name: Call Back   Assigned To: Tiarra Rodríguez   Regarding Patient: Elie Mary, Status: Active   Comment:    Anjelica Elaine - 26 Jul 2016 2:42 PM     TASK CREATED  Caller: Komal Spicer, Marian Martin called, the pharmacy will not fill his Fentanyl patch Rx because he stated you had told him 1 patch- if needed 2 patches every three days, and per pharmacy that was not written on the RX so he is not able to fill because its too early  Would a call to pharmacy help? Let me know how I can help  Rema Obrien - 26 Jul 2016 3:42 PM     TASK REPLIED TO: Previously Assigned To Rema Obrien  He is off schedule with his fentanyl patches and oxy  He should still have a box (2 week supply) of fentanyl patches at home but he can fill his oxy today  He is not due for more patches until early August    Leann Osman - 27 Jul 2016 8:48 AM     TASK REPLIED TO: Previously Assigned To Anjelica Elaine  Pt called office back this morning stating he was told to use two patches if needed  He used his last two patches and has no more patches at this time  Pharmacy won't fill script unless new scripts are printed  I relayed your reply to him, but now he is requesting a call back at phone number [phone]  Patient will come to [facility] to hand in the fentanyl 12mcg patch scripts and be issued 25mcg  He forgot to say that he is always wearing 2 patches  Plan  Arthralgia, unspecified joint, Cancer related pain    · FentaNYL 25 MCG/HR Transdermal Patch 72 Hour; APPLY 1 PATCH EVERY 3  DAYS    Signatures   Electronically signed by :  YANCY Antonio ; Jul 27 2016  8:59AM EST                       (Author)

## 2018-01-15 NOTE — MISCELLANEOUS
Message  CALLED PT TO [facility] MISSED APPT AND PT SAID THAT HE WILL CALL BACK TO [facility]  HE SAID THAT HE WAS AT A   Active Problems    1  Adenocarcinoma of right lung (162 9) (C34 91)   2  Adrenal mass (255 9) (E27 9)   3  Anxiety (300 00) (F41 9)   4  Arthralgia, unspecified joint (719 40) (M25 50)   5  Cancer related pain (338 3) (G89 3)   6  Chemotherapy-induced nausea (787 02,E933 1) (R11 0,T45  1X5A)   7  Subcutaneous nodule (782 2) (R22 9)   8  Swelling (782 3) (R60 9)    Current Meds   1  Etodolac 500 MG Oral Tablet; TAKE 1 TABLET TWICE DAILY WITH MEALS; Therapy: 2016 to (Marquise Dill)  Requested for: 2016; Last   Rx:01Ptd9125 Ordered   2  FentaNYL 25 MCG/HR Transdermal Patch 72 Hour; APPLY 1 PATCH EVERY 3 DAYS; Therapy: 11Cwu6356 to (Evaluate:20Ysa7767); Last Rx:75Pkk2801 Ordered   3  Omeprazole 20 MG Oral Capsule Delayed Release; TAKE 1 CAPSULE DAILY 30   minutes before eating; Therapy: 71Sjv5828 to (Marquise Dill)  Requested for: 2016; Last   Rx:88Csp3611 Ordered   4  Opdivo 100 MG/10ML Intravenous Solution; Therapy: 51Ltu9955 to Recorded   5  OxyCODONE HCl - 10 MG Oral Tablet; TAKE 1 TABLET EVERY 4 HOURS AS NEEDED   FOR BREAKTHROUGH PAIN;   Therapy: 39AIO4809 to (Evaluate:30Yrw5571); Last Rx:17Yse8430 Ordered    Allergies    1  Ondansetron HCl TABS    Signatures   Electronically signed by :  Benny York, ; May  9 2016 10:29AM EST                       (Author)

## 2018-01-15 NOTE — MISCELLANEOUS
Message   Recorded as Task   Date: 04/04/2016 10:06 AM, Created By: Sylvia Amaral   Task Name: Call Back   Assigned To: Christelle Gu   Regarding Patient: Javier Simon, Status: Active   Comment:    Cheyenne Rico - 04 Apr 2016 10:06 AM     TASK CREATED  pt called asking for a prescription for Oxycontin and have his wife pick it up today in the office    Patient will be getting CT scan rescheduled and seeing Melinda Blanchard for follow up after  Pt reports he is waiting to hear back from our office regarding these appts  Chiquita Manner will be following up  Pt is seeing Palliative Care on Thursday  Will give patient Rx for oxycodone - 15 day supply      Active Problems    1  Acute pain (338 19) (R52)   2  Adenocarcinoma of right lung (162 9) (C34 91)   3  Adrenal mass (255 9) (E27 9)   4  Anxiety (300 00) (F41 9)   5  Chemotherapy-induced nausea (787 02,E933 1) (R11 0,T45  1X5A)   6  Epigastric pain (789 06) (R10 13)   7  Gastritis (535 50) (K29 70)   8  Hospital discharge follow-up (V67 59) (Z09)    Current Meds   1  Morphine Sulfate ER 15 MG Oral Tablet Extended Release; one tab po every 8 hours for   ongoing pain control; Therapy: 42DER7968 to (Last Rx:23Mar2016) Ordered   2  OxyCODONE HCl - 5 MG Oral Tablet; TAKE 1 TO 2 TABLETS EVERY 4 HOURS AS   NEEDED FOR PAIN  For ongoing pain control; Therapy: 61REU2946 to (Evaluate:07Apr2016); Last Rx:23Mar2016 Ordered    Allergies    1  No Known Drug Allergies    Plan  Adenocarcinoma of right lung    · OxyCODONE HCl - 5 MG Oral Tablet; TAKE 1 TO 2 TABLETS EVERY 4 HOURS  AS NEEDED FOR PAIN    For ongoing pain control    Signatures   Electronically signed by : Erika Sepulveda, ; Apr 4 2016 12:13PM EST                       (Author)

## 2018-01-15 NOTE — MISCELLANEOUS
Message   Recorded as Task   Date: 03/30/2017 09:21 AM, Created By: Monet Rivera   Task Name: Hospital Call   Assigned To: Will Morse   Regarding Patient: Fawad Pillai, Status: Active   CommentGeraldene Laughter - 30 Mar 2017 9:21 AM     TASK CREATED  Caller: Sussy Marah ; [phone]  [facility] ED called office stating pt is currently in ED as he is currently out of Fentanyl 50mcg patches  PDMP ran, pt had filled 3/8/2017  He ran out too soon and Sussy Crystal from ED states he has been there once before (early March) with same complaint  Sussy Crystal states pt will not be given anything at this visit  Next appt scheduled 4/6/2017  Please advise  Monet Rivera - 31 Mar 2017 8:19 AM     TASK EDITED  Pt called office yesterday trying to explain he has no more Fentanyl patches and states he isn't exactly sure why  He continued to ask what he should do  I told him I have a task in to you, but you would not be available the day of the call  He insisted that he needed patches so I told him he is welcome to go to [place] [address] ED if he felt pain was unbearable, but explained to him that I was absolutely uncertain they would be able to treat him if he did go  ED staff called requesting a call back from Palliative Care  Dr Olivier Barnes had discussion with ED team, but I am not aware of what transpired at this time  No early opioid refill  Will call in prednisone 10mg daily to help him through the next week  He states that he was changing the patches on the 3rd day and not after 72 hours  States "that was my fault "      Plan  Cancer related pain, Decreased appetite, Nausea, Rheumatoid factor positive    · From  PredniSONE 10 MG Oral Tablet consider during pain flare To  PredniSONE 10 MG Oral Tablet 1 tab daily in the morning with food    Signatures   Electronically signed by :  YANCY Girard ; Mar 31 2017 10:33AM EST                       (Author)

## 2018-01-15 NOTE — MISCELLANEOUS
Message   Recorded as Task   Date: 07/11/2016 09:54 AM, Created By: Kaitlin Del Rio   Task Name: Follow Up   Assigned To: Rema Obrien   Regarding Patient: Fareed So, Status: Active   Comment:    ElaineAnjelica - 11 Jul 2016 9:54 AM     TASK CREATED  Caller: Venkatesh Milan, Other; Amy 39 called this morning  He started his nicotine patch on Friday  He started with a rash Saturday on his legs and face  The N patch was the only new drug he has taken  He states the rash on his legs seems to be spreading alittle  Should I advise him to go to ED? No swelling of any areas of mouth of throat per pt  He has already removed the patch  The rash is not itching  He has not tried any OTC products  No trouble breathing  Advised patient to try OTC benadryl and/or hydrocortisone cream to help the rash go away to to help with symptoms if they arise  If indeed the rash is related to the nicoderm CQ patch it should only get better now that he is not wearing the patch  If it gets worse or does not resolve, it may be related to something else and will need to be evaluated  Patient stated that he will keep an eye on things and let us know if they do not resolve  Plan  Adenocarcinoma of right lung, SocHx: Current some day smoker    · Nicotine 14 MG/24HR Transdermal Patch 24 Hour    Signatures   Electronically signed by :  YANCY Pope ; Jul 11 2016 10:12AM EST                       (Author)

## 2018-01-15 NOTE — PROGRESS NOTES
Discussion/Summary  Social Work-Discussion Summary [facility]: Patient is being seen for an ongoing assessment/follow up  Pt  known to [facility] from previous visit 2 weeks ago  Today he is accompanied with his wife, Fred Rodas stated his pain is not better but not worse  He asked Dr Santana Barron to stay on Fentanyl patch 50 mcg rather than going down as previously discussed  He feels more relief with the current dosing  Also, pt  disclosed he will be restarting chemo because he wants to shrink his tumor so it stops causing his back pain  Sol Rodas expressed sadness over the side effects he will be facing once chemo does start  Support provided  Pt  requesting help finding a new PCP as his previous doctor is now not in network  [facility] printed off list for [facility] that is in-network with [facility]  Pt  thankful  Will follow and assist as needed  Signatures   Electronically signed by : Jeison Adams LCSW; Sep 13 2017  2:31PM EST                       (Author)    Electronically signed by :  YANCY Schmid ; Sep 13 2017  3:33PM EST                       (Author)

## 2018-01-15 NOTE — MISCELLANEOUS
Message   Recorded as Task   Date: 2016 01:21 PM, Created By: Margaret Ferreira   Task Name: Med Renewal Request   Assigned To: Leopold Dupes   Regarding Patient: Nicole Gomez, Status: Active   Comment:    Margaret Ferreira - 11 Mar 2016 1:21 PM     TASK CREATED  Caller: Self; Renew Medication; [phone] (Home); [phone] (Work)  PATIENT NEEDS REFILLS TODAY IF POSS AND IF NOT PLEASE CALL HIM [phone]  DUE TO DEATH IN THE FAMILY THINGS SLIPED HIS MIND  HE NEEDS THE FOLLOWIN  MORPHINE SULFATE ER 15MG EXTENDED RELEASE SI TAB PO Q 12HR    2  OXYCODONE HCI 5MG  SI-2 TABS PO q4HRS PRN PAIN   Christelle Gu - 11 Mar 2016 2:09 PM     TASK REASSIGNED: Previously Assigned To Evens Montero - 14 Mar 2016 12:43 PM     TASK EDITED   Will leave Rx at  today - Patient has follow up with Dr Loreto Chaudhary 3/21/16 - will give patient 7 day supply  Active Problems    1  Acute pain (338 19) (R52)   2  Adenocarcinoma of right lung (162 9) (C34 91)   3  Adrenal mass (255 9) (E27 9)   4  Anxiety (300 00) (F41 9)   5  Chemotherapy-induced nausea (787 02,E933 1) (R11 0,T45  1X5A)   6  Epigastric pain (789 06) (R10 13)   7  Gastritis (535 50) (K29 70)   8  Hospital discharge follow-up (V67 59) (Z09)    Current Meds   1  LORazepam 0 5 MG Oral Tablet; take 1-2 tabs PO Q 4-6 hours PRN; Therapy: 90BQM0854 to (Last Rx:2016) Ordered   2  Morphine Sulfate ER 15 MG Oral Tablet Extended Release; One tab PO every 12 hours; Therapy: 28YCL5861 to (Evaluate:44Paq7835); Last Rx:2016 Ordered   3  Omeprazole 40 MG Oral Capsule Delayed Release; TAKE 1 CAPSULE DAILY; Therapy: 41WTF3933 to (Natasha Dumont)  Requested for: 44VHY1942; Last   Rx:2016 Ordered   4  Ondansetron 4 MG Oral Tablet Dispersible; TAKE 1 TABLET Every 6-8 hours PRN   NAUSEA MAY CAUSE MILD DROWSINESS; Therapy: 44CIL8103 to (Last Rx:2016)  Requested for: 98VQY0094 Ordered   5   OxyCODONE HCl - 5 MG Oral Tablet; TAKE 1 TO 2 TABLETS EVERY 4 HOURS AS   NEEDED FOR PAIN;   Therapy: 81UGV4185 to (Evaluate:10Mar2016); Last Rx:43Ghi5493 Ordered    Allergies    1  No Known Drug Allergies    Plan  Acute pain    · Morphine Sulfate ER 15 MG Oral Tablet Extended Release;  One tab PO every 12  hours  Adenocarcinoma of right lung    · OxyCODONE HCl - 5 MG Oral Tablet; TAKE 1 TO 2 TABLETS EVERY 4 HOURS  AS NEEDED FOR PAIN    Signatures   Electronically signed by : Elle Santana, ; Mar 14 2016  1:40PM EST                       (Author)

## 2018-01-15 NOTE — PROGRESS NOTES
Assessment    1  Adenocarcinoma of right lung (162 9) (C34 91)    Plan  Acute pain    · Morphine Sulfate ER 15 MG Oral Tablet Extended Release; one tab po every 8  hours for ongoing pain control   Rx By: Dolly Gowers; Dispense: 0 Days ; #:90 Tablet Extended Release; Refill: 0; For: Acute pain; BRYCE = N; Print Rx  Adenocarcinoma of right lung    · From  OxyCODONE HCl - 5 MG Oral Tablet TAKE 1 TO 2 TABLETS EVERY 4  HOURS AS NEEDED FOR PAIN To OxyCODONE HCl - 5 MG Oral Tablet TAKE 1 TO 2  TABLETS EVERY 4 HOURS AS NEEDED FOR PAIN  For ongoing pain control   Rx By: Dolly Gowers; Dispense: 15 Days ; #:120 Tablet; Refill: 0; For: Adenocarcinoma of right lung; BRYCE = N; Print Rx   · *1  PALLIATIVE CARE Physician Referral  Consult; pain management  Status: Hold  For - Scheduling  Requested for: 39HWC1898   Ordered; For: Adenocarcinoma of right lung; Ordered By: Dolly Gowers Performed:  Due: 15NSU0877  Care Summary provided  : Yes   · (1) CBC/PLT/DIFF; Status:Active; Requested for:2016;    Perform:[facility]; [id number];MOGWBYS; For:Adenocarcinoma of right lung; Ordered By:Josette Zaragoza;   · (1) COMPREHENSIVE METABOLIC PANEL; Status:Active; Requested for:2016;    Perform:[facility]; TQQ:47CNZ7164;EMMXTVZ; For:Adenocarcinoma of right lung; Ordered By:Richar Zaragoza;   · * CT CHEST ABDOMEN PELVIS W CONTRAST; Status:Need Information - Financial  Authorization; Requested for:2016 08:00PM;    Perform:[facility]; QRY:[id number]; Last Updated By:Radha Turcios; 3/23/2016 10:38:41 AM;Ordered; For:Adenocarcinoma of right lung; Ordered By:Richar Zaragoza;   · Follow-up visit in 1 week Evaluation and Treatment  Follow-up  Status: Hold For -  Scheduling  Requested for: 33KZS2586   Ordered;  For: Adenocarcinoma of right lung; Ordered By: Dolly Gowers Performed:  Due: 50IKH8099    Discussion/Summary  Discussion Summary:   In summary, this is a 57-year-old male who presented with right scapular pain  Core biopsy 9/18/16 of the 16 7cm mass identified poorly differentiated non-small carcinoma with areas of necrosis  No evidence of ALK gene rearrangement or mutation  No evidence of Ros-1 gene rearrangement  Onkosight testing 10/1/16 was consistent with adenocarcinoma  EGFR and KRAS mutations were negative  Brain MRI 9/16/15 identified: Normal MRI of the brain  No evidence of metastatic disease  Pet/ CT 10/2/15: Large right lung mass is again demonstrated, involving most of the right upper lung and perihilar region, with mass effect or direct involvement on the superior right lower lung and medial right middle lung  This mass extends to the pleural surface  This mass measures at least 13 2 x 15 4 x 19 5 cm  SUV measures 9 2  This is compatible with known malignancy  This mass has mass effect on central right lung bronchi  This mass also extends to the mediastinum and azygoesophageal recess  Mass effect on the SVC is also noted  Shift of the mediastinum to the left   9 x 2 3 cm right adrenal nodule demonstrates hypermetabolism, SUV   8 1, most compatible with a metastasis  He was on QP13420, but he is off study now for noncompliance he had more than 6 weeks of no shows  Cycle # 3 was 1/8/16  [id number] 1200mg and Carboplatin AUC 6 with Neulasta support day 2  Taxol discontinued secondary to reaction  He was re-imaged 12/24/15 with CT C/A/P: There has been significant interval reduction in size within the mass involving the right upper lobe  Largest dimension of mass now measures 12 6 x 5 4 cm  Prior measurement 15 5 x 8 8 cm  Mass continues to encase the right bronchus and bronchus intermedius  Mass   exerts compressive effect on the SVC  SVC remains patent  Will re-image with CT C/A/P and obtain CBCD and CMP prior  We will reconvene post imaging to review and develop new treatment plan     Medication SE Review and Pt Understands Tx: Possible side effects of new medications were reviewed with the patient/guardian today  The treatment plan was reviewed with the patient/guardian  The patient/guardian understands and agrees with the treatment plan   Counseling Documentation With Imm: total time of encounter was 35 minutes and 20 minutes was spent counseling  History of Present Illness  HPI: September 2015- patient presented with worsening right scapular pain over 3-4 weeks  CT chest showed a 16 x 13 x 12 cm lesion in the right upper lobe extending to the hilum  Indeterminate right adrenal invasion measuring 2 5 cm is noted  Biopsy of the pulmonary mass Showed a poorly differentiated carcinoma, TTF-1 and CK 7 positive  P  40, negative  Ultimately this was felt to represent a poorly differentiated adenocarcinoma  He experienced a chemo reaction to Taxol  He was hospitalized overnight secondary to dehydration, and intractable nausea and vomiting  This was felt to be secondary to a viral GI illness that his wife also had  Previous Therapy:   He was on BN06738, but he is off study now for noncompliance he had more than 6 weeks of no shows  Cycle # 3 was 1/8/16  [id number] 1200mg and Carboplatin AUC 6 with Neulasta support day 2  Interval History: Moved from the [facility] to [place] early February 2016  Diffuse body pain centered around lung tumor has been an ongoing issue  Finds that MS Contin 15mg bid and oxycodone are not lasting as long as they were  Decreased appetite  Denies nausea or vomiting  Difficulty sleeping secondary to the pain  Continues to use single point cane for balance issues  Occasionally lightheaded with position changes  Denies any headaches, vision changes  Has been experiencing edema of hands, toes  Denies dyspnea or chest pain  No sore throat or dysphagia  Review of Systems  Complete-Male:   Constitutional: feeling poorly and feeling tired, but as noted in HPI     Eyes: No complaints of eye pain, no red eyes, no discharge from eyes, no itchy eyes  ENT: no complaints of earache, no hearing loss, no nosebleeds, no nasal discharge, no sore throat, no hoarseness  Cardiovascular: No complaints of slow heart rate, no fast heart rate, no chest pain, no palpitations, no leg claudication, no lower extremity  Respiratory: No complaints of shortness of breath, no wheezing, no cough, no SOB on exertion, no orthopnea or PND  Gastrointestinal: No complaints of abdominal pain, no constipation, no nausea or vomiting, no diarrhea or bloody stools  Genitourinary: No complaints of dysuria, no incontinence, no hesitancy, no nocturia, no genital lesion, no testicular pain  Musculoskeletal: as noted in HPI  Integumentary: No complaints of skin rash or skin lesions, no itching, no skin wound, no dry skin  Neurological: as noted in HPI  Psychiatric: Is not suicidal, no sleep disturbances, no anxiety or depression, no change in personality, no emotional problems  Endocrine: No complaints of proptosis, no hot flashes, no muscle weakness, no erectile dysfunction, no deepening of the voice, no feelings of weakness  Hematologic/Lymphatic: No complaints of swollen glands, no swollen glands in the neck, does not bleed easily, no easy bruising  Active Problems    1  Acute pain (338 19) (R52)   2  Adenocarcinoma of right lung (162 9) (C34 91)   3  Adrenal mass (255 9) (E27 9)   4  Anxiety (300 00) (F41 9)   5  Chemotherapy-induced nausea (787 02,E933 1) (R11 0,T45  1X5A)   6  Epigastric pain (789 06) (R10 13)   7  Gastritis (535 50) (K29 70)   8  Hospital discharge follow-up (V67 59) (Z09)    Family History    1  No pertinent family history    2  No pertinent family history    Social History    · Current some day smoker (305 1) (F17 210)   · Former consumption of alcohol (V11 3) (P39 362)    Current Meds   1  LORazepam 0 5 MG Oral Tablet; take 1-2 tabs PO Q 4-6 hours PRN; Therapy: 72AEM1453 to (Last Rx:14Jan2016) Ordered   2   Morphine Sulfate ER 15 MG Oral Tablet Extended Release; One tab PO every 12 hours; Therapy: 33Mdh6658 to (Last Rx:14Mar2016) Ordered   3  Omeprazole 40 MG Oral Capsule Delayed Release; TAKE 1 CAPSULE DAILY; Therapy: 71VUE5826 to (Zenon Salas)  Requested for: 60IMI2768; Last   Rx:06Jan2016 Ordered   4  Ondansetron 4 MG Oral Tablet Dispersible; TAKE 1 TABLET Every 6-8 hours PRN   NAUSEA MAY CAUSE MILD DROWSINESS; Therapy: 92EWM1002 to (Last Rx:06Jan2016)  Requested for: 40VDK0934 Ordered   5  OxyCODONE HCl - 5 MG Oral Tablet; TAKE 1 TO 2 TABLETS EVERY 4 HOURS AS   NEEDED FOR PAIN;   Therapy: 27FUY6113 to (Evaluate:25Mar2016); Last Rx:14Mar2016 Ordered    Allergies    1  No Known Drug Allergies    Vitals  Vital Signs [Data Includes: Current Encounter]    Recorded: 69UYP6513 10:05AM   Temperature 96 6 F   Heart Rate 70   Respiration 18   Systolic 183   Diastolic 70   Height 6 ft 4 in   Weight 169 lb    BMI Calculated 20 57   BSA Calculated 2 06   O2 Saturation 96     Physical Exam    Constitutional   General appearance: No acute distress, well appearing and well nourished  Eyes   Conjunctiva and lids: No swelling, erythema, or discharge  Pupils and irises: Equal, round and reactive to light  Ears, Nose, Mouth, and Throat   External inspection of ears and nose: Normal     Oropharynx: Normal with no erythema, edema, exudate or lesions  Pulmonary   Respiratory effort: No increased work of breathing or signs of respiratory distress  Auscultation of lungs: Clear to auscultation, equal breath sounds bilaterally, no wheezes, no rales, no rhonci  Cardiovascular   Auscultation of heart: Normal rate and rhythm, normal S1 and S2, without murmurs  trace swelling of right 3rd digit of hand/ toe without redness, warmth  Abdomen   Abdomen: Non-tender, no masses  Liver and spleen: No hepatomegaly or splenomegaly      Musculoskeletal   Gait and station: Normal     Skin   Skin and subcutaneous tissue: Normal without rashes or lesions  Psychiatric   Orientation to person, place and time: Normal     Mood and affect: Normal     Additional Exam:  No facial swelling  No fullness supraclavicular region  No abnormal cervical vasculature apparent         ECOG 1       Signatures   Electronically signed by : Maggie Quezada PAC; Mar 23 2016 10:49AM EST                       (Author)    Electronically signed by : YANCY Quiñones D ,DO; Mar 23 2016  5:37PM EST

## 2018-01-16 NOTE — MISCELLANEOUS
Message  Message Free Text Note Form: I received a phone call from patient's wife Sue Aguirre this morning at 10:30  She tells me the patient has become short of breath and has increasing abdominal pain with each time he takes Valium  She tells me he has been on Xanax in the past and that seems to help his symptoms however ever since he was switched to Valium he has developed shortness of breath  I asked if he was still short of breath at this time and she expressed that he was  I have asked her to please go to the ER to be evaluated for the shortness of breath he is experiencing  She has stopped his Valium as of this morning  I have asked her to speak to the ER physicians and see if they would be willing to prescribe to Xanax (2 pills) today until she is able to meet with Dr Collin Parker  I have asked her to call Dr Obrien's office tomorrow morning at 08:00 to see if she has any time available to meet with them  She is agreeable with taking him to the ER  The case was discussed with Dr Collin Parker and checked on the database   Patient does not appear to have ever received Xanax in the past       Signatures   Electronically signed by : Shilpi Alas DO; Nov 26 2017 12:26PM EST                       (Author)

## 2018-01-16 NOTE — MISCELLANEOUS
Message  PT WAS A NO SHOW FOR APPT TODAY WITH  [facility] [place]  CALLED, NO ANSWER, LEFT MSG FOR PT TO CALL BACK AND RESCHEDULE  Active Problems    1  Adenocarcinoma of right lung (162 9) (C34 91)   2  Adrenal mass (255 9) (E27 9)   3  Anxiety (300 00) (F41 9)   4  Arthralgia, unspecified joint (719 40) (M25 50)   5  Cancer related pain (338 3) (G89 3)   6  Continuous opioid dependence (304 01) (F11 20)   7  Decreased appetite (783 0) (R63 0)   8  Decreased libido (799 81) (R68 82)   9  Depression (311) (F32 9)   10  Insomnia (780 52) (G47 00)   11  Metastasis to adrenal gland (198 7) (C79 70)   12  Nausea (787 02) (R11 0)   13  Neuropathic pain (729 2) (M79 2)   14  Rheumatoid factor positive (795 79) (R76 8)   15  Under care of palliative care specialist (V66 7) (Z51 5)    Current Meds   1  Etodolac 500 MG Oral Tablet; TAKE 1 TABLET TWICE DAILY WITH MEALS; Therapy: 44BGY5460 to (Evaluate:13May2017)  Requested for: 85FZP4710; Last   Rx:14Nov2016 Ordered   2  FentaNYL 50 MCG/HR Transdermal Patch 72 Hour; APPLY 1 PATCH EVERY 3 DAYS; Therapy: 92RLJ4613 to (Evaluate:10Mar2017); Last Rx:94Lsk5409 Ordered   3  Gabapentin 400 MG Oral Capsule; TAKE 1 CAPSULE AT BEDTIME; Therapy: 39PQW4407 to (Evaluate:30Xkn3145)  Requested for: 59LCX4050; Last   Rx:11Jan2017 Ordered   4  Mirtazapine 15 MG Oral Tablet; TAKE 1 TABLET AT BEDTIME; Therapy: 21KQX3351 to (Evaluate:13May2017)  Requested for: 35VVI8261; Last   Rx:14Nov2016 Ordered   5  Omeprazole 20 MG Oral Capsule Delayed Release; TAKE 1 CAPSULE DAILY 30   minutes before eating; Therapy: 86Ufe4224 to (Evaluate:22Jan2017)  Requested for: 89Fcl8477; Last   Rx:39Gwm2470 Ordered   6  OxyCODONE HCl - 10 MG Oral Tablet; 1 tab PO every 4 hours as needed for   breatkthrough pain; Therapy: 99RGM4925 to (Evaluate:10Mar2017); Last Rx:21Wic0182 Ordered   7  PredniSONE 10 MG Oral Tablet; Take 1 tablet daily;    Therapy: 85FOA1338 to [phone])  Requested for: 33YKA7360; Last   Rx:23Jan2017 Ordered   8  Viagra 50 MG Oral Tablet; 1 tablet 30 minutes before activity as needed; Therapy: 27VNO9144 to (Evaluate:68Dkp4006)  Requested for: 02MHN9865; Last   Rx:07Qrd4619 Ordered    Allergies    1  Nicotine Transdermal System 21-14-7 MG/24HR KIT   2   Ondansetron HCl TABS    Signatures   Electronically signed by : Katina Das, ; Feb 16 2017  1:02PM EST                       (Author)

## 2018-01-16 NOTE — MISCELLANEOUS
Message   Recorded as Task   Date: 10/12/2016 12:18 PM, Created By: Robyn Patterson   Task Name: Call Back   Assigned To: Yane Briscoe   Regarding Patient: Robin Cheadle, Status: Active   CommentBlorville Lyman - 12 Oct 2016 12:18 PM     TASK CREATED  Caller: Self; Other; [phone] (Home)  Stated that Arlin Pride informed him that if he ever ran into any problems with paying him utilities that he can call us  He stated that his electricity was just shut off and that the company stated that our office would need to call them to confirm that the pt has a terminal illness & cannot pay his bills  The phone # is [phone]  Requested papers to be faxed from [facility]  Active Problems    1  Adenocarcinoma of right lung (162 9) (C34 91)   2  Adrenal mass (255 9) (E27 9)   3  Anxiety (300 00) (F41 9)   4  Arthralgia, unspecified joint (719 40) (M25 50)   5  Cancer related pain (338 3) (G89 3)   6  Nausea (787 02) (R11 0)   7  Neuropathic pain (729 2) (M79 2)   8  Rheumatoid factor positive (795 79) (R76 8)    Current Meds   1  Dexamethasone 4 MG Oral Tablet; TAKE 1 TABLET WITH BREAKFAST AND 1 TABLET   WITH LUNCH FOR 7 DAYS  TAKE WITH FOOD; Therapy: 33ANW3128 to (Bharti Bereketut)  Requested for: 66TYK6623; Last   Rx:88Chf4986 Ordered   2  FentaNYL 12 MCG/HR Transdermal Patch 72 Hour; APPLY 1 PATCH EVERY 3 DAYS,   together with 25mcg/hr patch for total of 37mcg/hr; Therapy: 89SZW3973 to (Evaluate:03Nov2016); Last Rx:04Oct2016 Ordered   3  FentaNYL 25 MCG/HR Transdermal Patch 72 Hour; APPLY 1 PATCH EVERY 3 DAYS,   together with 12mcg/hr patch for total of 37mcg/hr; Therapy: 73Jse9439 to (Evaluate:03Nov2016); Last Rx:04Oct2016 Ordered   4  Gabapentin 300 MG Oral Capsule; One pill at bedtime; Therapy: 64MPK4757 to (Evaluate:23Oct2016)  Requested for: 51HDG4004; Last   Rx:55Nkj0957 Ordered   5   Omeprazole 20 MG Oral Capsule Delayed Release; TAKE 1 CAPSULE DAILY 30   minutes before eating; Therapy: 27Apr2016 to (Evaluate:22Jan2017)  Requested for: 12Hub1327; Last   Rx:38Yvq5535 Ordered   6  OxyCODONE HCl - 10 MG Oral Tablet; TAKE 1 to 1 5 TABLET (10-15mg) EVERY 4   HOURS AS NEEDED FOR BREAKTHROUGH PAIN;   Therapy: 61GBU5468 to (Evaluate:23Oct2016); Last Rx:14Srj4362 Ordered    Allergies    1  Nicotine Transdermal System 21-14-7 MG/24HR KIT   2   Ondansetron HCl TABS    Signatures   Electronically signed by : Ellie Shaw, ; Oct 12 2016  1:23PM EST                       (Author)

## 2018-01-16 NOTE — PROGRESS NOTES
Assessment    1  Adenocarcinoma of right lung (162 9) (C34 91)   2  Adrenal mass (255 9) (E27 9)   3  Arthralgia, unspecified joint (719 40) (M25 50)   4  Decreased appetite (783 0) (R63 0)    Plan  Adenocarcinoma of right lung, Cancer related pain    · FentaNYL 50 MCG/HR Transdermal Patch 72 Hour; APPLY 1 PATCH EVERY 3  DAYS   Rx By: Mayra Logan; Dispense: 30 Days ; #:10 Patch 72 Hour; Refill: 0; For: Adenocarcinoma of right lung, Cancer related pain; BRYCE = N; Print Rx  Arthralgia, unspecified joint, Cancer related pain    · OxyCODONE HCl - 10 MG Oral Tablet; 1 tab PO every 4 hours as needed for  breatkthrough pain   Rx By: Mayra Logan; Dispense: 30 Days ; #:180 Tablet; Refill: 0; For: Arthralgia, unspecified joint, Cancer related pain; BRYCE = N; Print Rx  Arthralgia, unspecified joint, Cancer related pain, Rheumatoid factor positive    · Etodolac 500 MG Oral Tablet   Rx By: Mayra Logan; Dispense: 30 Days ; #:60 Tablet; Refill: 5; For: Arthralgia, unspecified joint, Cancer related pain, Rheumatoid factor positive; BRYCE = N; Sent To: [facility] [place] [address]  Cancer related pain, Decreased appetite, Nausea, Rheumatoid factor positive    · From  PredniSONE 10 MG Oral Tablet Take 1 tablet daily To PredniSONE 10  MG Oral Tablet consider during pain flare   Rx By: Mayra Logan; Dispense: 30 Days ; #:30 Tablet; Refill: 1; For: Cancer related pain, Decreased appetite, Nausea, Rheumatoid factor positive; BRYCE = N; Record  Decreased appetite    · Dronabinol 5 MG Oral Capsule; TAKE 1 CAPSULE TWICE DAILY   Rx By: Mayra Logan; Dispense: 30 Days ; #:60 Capsule; Refill: 0; For: Decreased appetite; BRYCE = N; Print Rx; Msg to Pharmacy: stop mirtazepine  Decreased appetite, Depression, Insomnia,     · Mirtazapine 15 MG Oral Tablet   Rx By: Mayra Logan; Dispense: 30 Days ; #:30 Tablet;  Refill: 5; For: Decreased appetite, Depression, Insomnia, ; RBYCE = N; Sent To: [facility] [place] [address] STRE  PMH: History of gastritis    · Omeprazole 20 MG Oral Capsule Delayed Release   Rx By: Renetta Boggs; Dispense: 30 Days ; #:30 Capsule Delayed Release; Refill: 5; For: PMH: History of gastritis; BRYCE = N; Sent To: [facility] [facility]    Discussion/Summary  Discussion Summary:   Pains/Arthralgias - Continue current fentanyl, oxy, gabapentin dosing  Patient has stopped use of NSAIDS and prednisone  He is willing to consider PRN use of steroids during pain flares  Stopping NSAIDS and prednisone have likely helped him not need a PPI  Lack of appetite - pt stop mirtazepine as it did not seem to be helping  Discussed the future use of medical marijuana in [place] when the dispensing infrastructure becomes available  Offered a script for [employer]  Lung cancer - Encouraged face to face follow up with Dr Debora Palma to discuss next steps if he does not want to go with an adrenal biopsy  Also encouraged him to look into pursuing a second opinion  Right now he is passively not treating his cancer  Goals and Barriers: The patient has the current Goals: Pain control  The patent has the current Barriers: Copays  Patient's Capacity to Self-Care: Patient is able to Self-Care  Medication SE Review and Pt Understands Tx: Possible side effects of new medications were reviewed with the patient/guardian today  The treatment plan was reviewed with the patient/guardian  The patient/guardian understands and agrees with the treatment plan       Self Referrals:   Self Referrals: No       Transitional Care: Co-manage care with PCP/Referring Provider  Chief Complaint  Chief Complaint Free Text Note Form: Follow up - Pain      History of Present Illness  HPI: 36yoM who initially presented with right scapular pain  Imaging studies revealed a large right upper lung mass  He was diagnosed with stage IV adenocarcinoma of the lung with mets to his right adrenal gland   He was on Opdivo and developed drug related? RA  Since that time he has continued to have whole body pains  A recent CT demonstrated continued decrease in size of his lung mass but progression of his adrenal lesion  A biopsy of this adrenal lesion has been recommended by his treating oncologist but so far he does not wish to pursue this as an attempted biopsy in the past was deemed too risky  Currently he is not pursuing further cancer treatment or work up  He reports that is pains are controlled on his current regime and his medication list was updated as to his current use of prescription medications  He would like to keep things the same for now but questions the role of medical marijuana in his care  Review of Systems  Complete-Male:   Constitutional: feeling poorly and feeling tired, but No fever or chills, feels well, no tiredness, no recent weight gain or weight loss  Eyes: No complaints of eye pain, no red eyes, no discharge from eyes, no itchy eyes  ENT: no complaints of earache, no hearing loss, no nosebleeds, no nasal discharge, no sore throat, no hoarseness  Cardiovascular: No complaints of slow heart rate, no fast heart rate, no chest pain, no palpitations, no leg claudication, no lower extremity  Respiratory: No complaints of shortness of breath, no wheezing, no cough, no SOB on exertion, no orthopnea or PND  Gastrointestinal: lack of appetite, but No complaints of abdominal pain, no constipation, no nausea or vomiting, no diarrhea or bloody stools  Genitourinary: No complaints of dysuria, no incontinence, no hesitancy, no nocturia, no genital lesion, no testicular pain  Musculoskeletal: arthralgias, limb pain and joint stiffness  Integumentary: No complaints of skin rash or skin lesions, no itching, no skin wound, no dry skin  Neurological: No compliants of headache, no confusion, no convulsions, no numbness or tingling, no dizziness or fainting, no limb weakness, no difficulty walking     Psychiatric: Is not suicidal, no sleep disturbances, no anxiety or depression, no change in personality, no emotional problems  Endocrine: No complaints of proptosis, no hot flashes, no muscle weakness, no erectile dysfunction, no deepening of the voice, no feelings of weakness  Hematologic/Lymphatic: No complaints of swollen glands, no swollen glands in the neck, does not bleed easily, no easy bruising  Active Problems    1  Adenocarcinoma of right lung (162 9) (C34 91)   2  Adrenal mass (255 9) (E27 9)   3  Anxiety (300 00) (F41 9)   4  Arthralgia, unspecified joint (719 40) (M25 50)   5  Cancer related pain (338 3) (G89 3)   6  Continuous opioid dependence (304 01) (F11 20)   7  Decreased appetite (783 0) (R63 0)   8  Decreased libido (799 81) (R68 82)   9  Depression (311) (F32 9)   10  Insomnia (780 52) (G47 00)   11  Metastasis to adrenal gland (198 7) (C79 70)   12  Nausea (787 02) (R11 0)   13  Neuropathic pain (729 2) (M79 2)   14  Rheumatoid factor positive (795 79) (R76 8)   15  Under care of palliative care specialist (V66 7) (Z51 5)    Past Medical History    1  History of Chemotherapy-induced nausea (787 02,E933 1) (R11 0,T45  1X5A)   2  History of Epigastric pain (789 06) (R10 13)   3  History of gastritis (V12 79) (Z87 19)   4  History of Hospital discharge follow-up (V67 59) (Z09)   5  History of Subcutaneous nodule (782 2) (R22 9)   6  History of Swelling (782 3) (R60 9)    Family History  Mother    1  No pertinent family history  Father    2  No pertinent family history    Social History    · Current some day smoker (305 1) (F17 200)   · Five children   · Former consumption of alcohol (V11 3) (Z87 898)   ·    · Occupation  Social History Reviewed: The social history was reviewed and updated today  Current Meds   1  Etodolac 500 MG Oral Tablet; TAKE 1 TABLET TWICE DAILY WITH MEALS; Therapy: 59RRH7998 to (Evaluate:52Rva9980)  Requested for: 25VWE2763; Last   Rx:14Nov2016 Ordered   2   FentaNYL 50 MCG/HR Transdermal Patch 72 Hour; APPLY 1 PATCH EVERY 3 DAYS; Therapy: 19RNE2746 to (Evaluate:10Mar2017); Last Rx:47Hys3288 Ordered   3  Gabapentin 400 MG Oral Capsule; TAKE 1 CAPSULE AT BEDTIME; Therapy: 74XJK0923 to (Evaluate:76Umz0489)  Requested for: 70WZE8703; Last   Rx:11Jan2017 Ordered   4  Mirtazapine 15 MG Oral Tablet; TAKE 1 TABLET AT BEDTIME; Therapy: 29JLX9513 to (Evaluate:13May2017)  Requested for: 90JRN4753; Last   Rx:14Nov2016 Ordered   5  Omeprazole 20 MG Oral Capsule Delayed Release; TAKE 1 CAPSULE DAILY 30 minutes   before eating; Therapy: 22Zyw0941 to (Evaluate:22Jan2017)  Requested for: 82Ryw2215; Last   Rx:98Rfv2022 Ordered   6  OxyCODONE HCl - 10 MG Oral Tablet; 1 tab PO every 4 hours as needed for   breatkthrough pain; Therapy: 87SOP5982 to (Evaluate:10Mar2017); Last Rx:07Vno4492 Ordered   7  PredniSONE 10 MG Oral Tablet; Take 1 tablet daily; Therapy: 49RES1240 to (ECVAQABV:74XIT6902)  Requested for: 31ORR1241; Last   UK:70MVJ3674 Ordered   8  Viagra 50 MG Oral Tablet; 1 tablet 30 minutes before activity as needed; Therapy: 27TLS6283 to (Evaluate:06Feb2017)  Requested for: 68IOV1672; Last   Rx:17Erl9502 Ordered  Medication List Reviewed: The medication list was reviewed and updated today  Allergies    1  Nicotine Transdermal System 21-14-7 MG/24HR KIT   2  Ondansetron HCl TABS    Vitals  Vital Signs    Recorded: 28NYE6243 02:05PM   Temperature 98 8 F   Heart Rate 96   Respiration 18   Systolic 330   Diastolic 68   Height 6 ft 3 in   Weight 178 lb    BMI Calculated 22 25   BSA Calculated 2 09   Pain Scale 0     Physical Exam    Constitutional   General appearance: No acute distress, well appearing and well nourished  Eyes   Conjunctiva and lids: No swelling, erythema, or discharge  Pupils and irises: Equal, round and reactive to light      Ears, Nose, Mouth, and Throat   External inspection of ears and nose: Normal     Nasal mucosa, septum, and turbinates: Normal without edema or erythema  Oropharynx: Normal with no erythema, edema, exudate or lesions  Pulmonary   Respiratory effort: No increased work of breathing or signs of respiratory distress  Cardiovascular   Examination of extremities for edema and/or varicosities: Normal     Abdomen   Abdomen: Non-tender, no masses  Musculoskeletal   Gait and station: Abnormal   walks with a cane  Skin   Skin and subcutaneous tissue: Normal without rashes or lesions  Neurologic   Cranial nerves: Cranial nerves 2-12 intact  Psychiatric   Orientation to person, place and time: Normal     Mood and affect: Normal          Signatures   Electronically signed by :  YANCY Ferreira ; Mar  8 2017  2:36PM EST                       (Author)

## 2018-01-17 NOTE — MISCELLANEOUS
Message  PT WAS A NO SHOW FOR HIS APPT TODAY WITH  [facility] [place]  CALLED PT, NO ANSWER, LEFT MSG FOR PT TO CALL BACK AND RESCHEDULE  Active Problems    1  Adenocarcinoma of right lung (162 9) (C34 91)   2  Adrenal mass (255 9) (E27 9)   3  Anxiety (300 00) (F41 9)   4  Arthralgia, unspecified joint (719 40) (M25 50)   5  Cancer related pain (338 3) (G89 3)   6  Decreased appetite (783 0) (R63 0)   7  Decreased libido (799 81) (R68 82)   8  Depression (311) (F32 9)   9  Insomnia (780 52) (G47 00)   10  Metastasis to adrenal gland (198 7) (C79 70)   11  Nausea (787 02) (R11 0)   12  Neuropathic pain (729 2) (M79 2)   13  Rheumatoid factor positive (795 79) (R76 8)    Current Meds   1  Etodolac 500 MG Oral Tablet; TAKE 1 TABLET TWICE DAILY WITH MEALS; Therapy: 53RCV3914 to (Evaluate:13May2017)  Requested for: 09AWU1975; Last   Rx:14Nov2016 Ordered   2  FentaNYL 12 MCG/HR Transdermal Patch 72 Hour; APPLY 1 PATCH EVERY 3 DAYS,   together with 25mcg/hr patch for total of 37mcg/hr; Therapy: 10MUO6766 to (Evaluate:06Jan2017); Last Rx:09Bno1468 Ordered   3  FentaNYL 25 MCG/HR Transdermal Patch 72 Hour; APPLY 1 PATCH EVERY 3 DAYS,   together with 12mcg/hr patch for total of 37mcg/hr; Therapy: 22Gob8417 to (Evaluate:06Jan2017); Last Rx:07Jrh3191 Ordered   4  Mirtazapine 15 MG Oral Tablet; TAKE 1 TABLET AT BEDTIME; Therapy: 45LSU0032 to (Evaluate:13May2017)  Requested for: 99OGU1968; Last   Rx:14Nov2016 Ordered   5  Omeprazole 20 MG Oral Capsule Delayed Release; TAKE 1 CAPSULE DAILY 30   minutes before eating; Therapy: 34Txg2882 to (Evaluate:22Jan2017)  Requested for: 26Zzn4209; Last   Rx:89Jot0896 Ordered   6  OxyCODONE HCl - 10 MG Oral Tablet; 1 tab PO 4x per day only as needed for   breakthrough pain; Therapy: 67YTX5042 to (Evaluate:06Jan2017); Last Rx:58Jmo3629 Ordered   7  Viagra 50 MG Oral Tablet; 1 tablet 30 minutes before activity as needed;    Therapy: 23QTU8028 to (Evaluate:51Qck1147)  Requested for: 98DGA0015; Last   Rx:55Pdh1044 Ordered    Allergies    1  Nicotine Transdermal System 21-14-7 MG/24HR KIT   2   Ondansetron HCl TABS    Signatures   Electronically signed by : Moo Ferguson, ; Dec 19 2016 11:53AM EST                       (Author)

## 2018-01-18 NOTE — MISCELLANEOUS
Message   Recorded as Task   Date: 01/20/2016 02:26 PM, Created By: Devin April   Task Name: Med Renewal Request   Assigned To: Jeniffer Kearney   Regarding Patient: Neal Junior, Status: Active   Comment:    Devin April - 20 Jan 2016 2:26 PM     TASK CREATED  Caller: Self; [phone] (Home); [phone] (Work)  Pt asking for refill on Morphine said he had enough left for 2 days  Patients wife to  script at [facility] office tomorrow      Active Problems    1  Acute pain (338 19) (R52)   2  Adenocarcinoma of right lung (162 9) (C34 91)   3  Adrenal mass (255 9) (E27 9)   4  Anxiety (300 00) (F41 9)   5  Chemotherapy-induced nausea (787 02,E933 1) (R11 0,T45  1X5A)   6  Epigastric pain (789 06) (R10 13)   7  Gastritis (535 50) (K29 70)   8  Hospital discharge follow-up (V67 59) (Z09)    Current Meds   1  LORazepam 0 5 MG Oral Tablet; take 1-2 tabs PO Q 4-6 hours PRN; Therapy: 95XSF8121 to (Last Rx:14Jan2016) Ordered   2  Morphine Sulfate ER 15 MG Oral Tablet Extended Release; One tab PO every 12 hours; Therapy: 42VIU1322 to (Evaluate:66Fgq1424); Last Rx:20Jan2016; Status: ACTIVE -   Retrospective By Protocol Authorization Ordered   3  Omeprazole 40 MG Oral Capsule Delayed Release; TAKE 1 CAPSULE DAILY; Therapy: 01VHC0541 to ([facility])  Requested for: 89DSY2936; Last   Rx:06Jan2016 Ordered   4  Ondansetron 4 MG Oral Tablet Dispersible; TAKE 1 TABLET Every 6-8 hours PRN   NAUSEA MAY CAUSE MILD DROWSINESS; Therapy: 34WFZ8519 to (Last Rx:06Jan2016)  Requested for: 74OUJ7754 Ordered   5  OxyCODONE HCl - 5 MG Oral Tablet; TAKE 1 TO 2 TABLETS EVERY 4 HOURS AS   NEEDED FOR PAIN;   Therapy: 78NLW3802 to (Evaluate:31Oct2015); Last Rx:01Oct2015 Ordered   6  OxyCODONE HCl - 5 MG Oral Tablet; TAKE 1 TO 2 TABLETS EVERY 4 HOURS AS   NEEDED FOR PAIN;   Therapy: 42MQE2792 to (Evaluate:40Bzi7699); Last Rx:05Jan2016 Ordered    Allergies    1   No Known Drug Allergies    Signatures   Electronically signed by : Jada Esposito, ; Jan 20 2016  2:57PM EST                       (Author)

## 2018-01-22 VITALS
OXYGEN SATURATION: 98 % | DIASTOLIC BLOOD PRESSURE: 82 MMHG | SYSTOLIC BLOOD PRESSURE: 120 MMHG | BODY MASS INDEX: 23.5 KG/M2 | RESPIRATION RATE: 17 BRPM | WEIGHT: 189 LBS | TEMPERATURE: 97.7 F | HEART RATE: 90 BPM | HEIGHT: 75 IN

## 2018-01-22 VITALS
HEART RATE: 91 BPM | RESPIRATION RATE: 16 BRPM | OXYGEN SATURATION: 97 % | DIASTOLIC BLOOD PRESSURE: 80 MMHG | SYSTOLIC BLOOD PRESSURE: 120 MMHG | BODY MASS INDEX: 22.41 KG/M2 | TEMPERATURE: 98.4 F | WEIGHT: 179.31 LBS

## 2018-01-22 VITALS
DIASTOLIC BLOOD PRESSURE: 68 MMHG | TEMPERATURE: 98.8 F | BODY MASS INDEX: 22.13 KG/M2 | HEIGHT: 75 IN | WEIGHT: 178 LBS | RESPIRATION RATE: 18 BRPM | SYSTOLIC BLOOD PRESSURE: 130 MMHG | HEART RATE: 96 BPM

## 2018-01-22 VITALS
HEIGHT: 75 IN | DIASTOLIC BLOOD PRESSURE: 78 MMHG | HEART RATE: 96 BPM | TEMPERATURE: 97.8 F | OXYGEN SATURATION: 97 % | RESPIRATION RATE: 18 BRPM | SYSTOLIC BLOOD PRESSURE: 120 MMHG | WEIGHT: 186 LBS | BODY MASS INDEX: 23.13 KG/M2

## 2018-01-23 NOTE — MISCELLANEOUS
Message   Recorded as Task   Date: 2017 09:08 AM, Created By: Katie Lao   Task Name: Care Coordination   Assigned To: Jesús Jackman   Regarding Patient: Tona Cabrera, Status: Active   CommentLeota [facility] - 08 Dec 2017 9:08 AM     TASK CREATED  Patient pass away on 2017 wife wants me to notify you   I called [facility] to express my condolences  She said that she was at peace with the way things happened  He  at home in bed  She is drawing on her family and friends for support  As a dang she has colleagues checking up on her and declined a referral to the hospice bereavement program  She will contact us if she needs anything  Signatures   Electronically signed by :  YANCY Schmid ; Dec 11 2017  1:49PM EST                       (Author)

## 2018-01-23 NOTE — MISCELLANEOUS
Message   Recorded as Task   Date: 12/07/2017 10:26 AM, Created By: Andreia Sorenson   Task Name: Follow Up   Assigned To: Chip Lr   Regarding Patient: Shine Mays, Status: Active   Comment:    Andreia Sorenson - 07 Dec 2017 10:26 AM     TASK CREATED  Caller: Jeremiah Gambino calling to schedule a same day appt  I see that you have to new patients not sure if you needed the 40 mins  This patient was in the er discharged 3 days ago wife stated that he is slummped over not making full sentences not functioning of the legs such as walking she lucina litlle worried they increased his patched from 76 to 100 he doesnt know where he is mental status has changed please advise if she cant make it today is she allowed to decrease the patch back down to 75 and then make a follow up next week     [phone] can be reached   Tiffanie Camarena - 07 Dec 2017 10:35 AM     TASK REASSIGNED: Previously Assigned To [employer] - 07 Dec 2017 1:18 PM     TASK REASSIGNED: Previously Assigned To [employer] - 07 Dec 2017 4:01 PM     TASK EDITED  Pt's spouse will reduce Fentanyl to 75mcg as he has poor mental status and is increasingly concerned  He is unable to walk and is having BMs on himself  Since leaving the hospital [facility] reports he is not eating or drinking much  His legs remain swollen so they are elevating them and giving him lasix  He is not talking, just sits there with his head down and has been having BMs on himself  She believes he is overmedicated and wants to lower his fentanyl patch from 100mcg to 75mcg  She continues to prioritize his treatment over comfort  He is due for immunotherapy on 12/15 at [facility]  I have stressed that if they continue to want treatment he MUST show up for this appointment   At this point lowering his fentanyl patch may only improve his mental/functional status so much, the larger contributor to his current poor clinical state is his progressing disease  She states that she is not in denial and she knows that  However she wants to give him one last chance and that is the wish that he expressed for himself  She states "I don't want him treated like a dying man until I know he can't come back  I would rather him look in my eyes and be in pain than not be with me at all "     Being that Reyestoña Perezs is a dang I outlined the catch 22 circumstances that we often find ourselves in with advanced care and she has seen many cases like this too  She knows that hospice is an option but will only agree to that as a plan of care until she knows there is no hope  Signatures   Electronically signed by :  YANCY Amos ; Dec  7 2017  4:36PM EST                       (Author)

## 2018-06-15 ENCOUNTER — TELEPHONE (OUTPATIENT)
Dept: HEMATOLOGY ONCOLOGY | Facility: CLINIC | Age: 38
End: 2018-06-15

## 2018-06-15 NOTE — TELEPHONE ENCOUNTER
Wife Elham Murguia calls requesting an appointment to talk to Dr Akshat Turcios about her 's death  He  on 17  Please call

## 2018-06-20 NOTE — PLAN OF CARE
Problem: SLP ADULT - SWALLOWING, IMPAIRED  Goal: Initial SLP swallow eval performed  Outcome: Completed Date Met: 12/01/17    Goal: Advance to least restrictive diet without signs or symptoms of aspiration for planned discharge setting  See evaluation for individualized goals    Patient will tolerate the safest and least restrictive diet without overt s/sx aspiration x100%      Outcome: Progressing room air

## 2018-06-21 ENCOUNTER — TELEPHONE (OUTPATIENT)
Dept: HEMATOLOGY ONCOLOGY | Facility: CLINIC | Age: 38
End: 2018-06-21

## 2018-06-21 NOTE — TELEPHONE ENCOUNTER
Pt's wife Lauro Andrade called  States received a call from this office today but could not find anyone who may have called her  She still would like to speak with someone about her 's death to understand what happened  She received your message from 6/18/18  Informed Lauro Andrade you are out of office today but would send a message to call her when you return   # [phone]

## 2023-03-22 NOTE — PROGRESS NOTES
Brief Palliative Progress Note:    Prior authorization submitted for his Fentanyl and Oxycodone at 11:30 on 11/4/17  Will not be approved by time of discharge  Spoke with pharmacist at [facility], will provide 5 day script for now and fill will be completed once authorization is approved      Cayla Cabrera DO  Palliative and Supportive Care  [phone] [facility]  History & Physical    Patient Name: Surendra Lundy  MRN: [id number]  Patient Class: IP- Inpatient  Admission Date: 3/21/2023  Attending Physician: Dr. Dalton  Primary Care Provider: Eric Adler MD         Patient information was obtained from patient and ER records.     Subjective:     Principal Problem:Acute CVA (cerebrovascular accident)    Chief Complaint: Left sided weakness     HPI: The patient is a 75yo male with hx kidney stones and low back pain who presented to [facility] ED 3/21/23 with left-sided weakness and dizziness. Pt reports on 3/20/23 at appox 3pm, he noticed dizziness and loss of balance. When he awoke this am, he noticed he could barely get OOB due to left sided weakness and loss of balance. He spoke to his son who noticed slurred speech. He then went to ED for evaluation.   He said that he has not seen a physician in over 40 years. Pt reports symptoms improved since arrival to ED.      In the ED, /113. On exam there was weakness of the left face, PERNELL and LLE. CBC and CMP unremarkable.Troponin and proBNP WNL..CTH showed small vessel disease w/o acute changes.CXR clear. EKG NSR w/o ischemic changes.     Per vascular neurology (Dr. Carmona) patient has had a small vessel occlusion. TPA not indicated. The patient was given Plavix, Aspirin and IV Labetalol with improvement in his BP to 154/90.  He was weaned off the Labetalol drip. Vascular neurology recommended permissive HTN for now.   Pt was transferred to [facility] with diagnosis CVA, hypertensive emergency for Neurology evaluation and further work up.     The patient will be placed in observation under hospital medicine      Past Medical History:   Diagnosis Date    Kidney stones     Low back pain        Past Surgical History:   Procedure Laterality Date    CHOLECYSTECTOMY         Review of patient's allergies indicates:  Not on File    No current facility-administered medications on file prior to  encounter.     Current Outpatient Medications on File Prior to Encounter   Medication Sig    acetaminophen (TYLENOL) 325 MG tablet Take 325 mg by mouth every 6 (six) hours as needed for Pain.     Family History       Problem Relation (Age of Onset)    Heart disease Father    Hypertension Brother    No Known Problems Mother          Tobacco Use    Smoking status: Never    Smokeless tobacco: Never   Substance and Sexual Activity    Alcohol use: Yes     Comment: socially    Drug use: Not on file    Sexual activity: Not on file     Review of Systems   Constitutional:  Negative for appetite change, chills, diaphoresis, fatigue and fever.   HENT:  Negative for congestion, nosebleeds, sore throat and trouble swallowing.    Eyes:  Negative for pain, discharge and visual disturbance.   Respiratory:  Negative for apnea, cough, chest tightness, shortness of breath, wheezing and stridor.    Cardiovascular:  Negative for chest pain, palpitations and leg swelling.   Gastrointestinal:  Negative for abdominal distention, abdominal pain, blood in stool, constipation, diarrhea, nausea and vomiting.   Endocrine: Negative for cold intolerance and heat intolerance.   Genitourinary:  Negative for difficulty urinating, dysuria, flank pain, frequency and urgency.   Musculoskeletal:  Negative for arthralgias, back pain, joint swelling, myalgias, neck pain and neck stiffness.   Skin:  Negative for rash and wound.   Allergic/Immunologic: Negative for food allergies and immunocompromised state.   Neurological:  Positive for dizziness, facial asymmetry (left sided facial droop), speech difficulty and weakness (left sided). Negative for seizures, syncope, light-headedness and headaches.   Hematological:  Negative for adenopathy.   Psychiatric/Behavioral:  Negative for agitation, behavioral problems and confusion. The patient is not nervous/anxious.    Objective:     Vital Signs (Most Recent):  Temp: 97.9 °F (36.6 °C) (03/21/23  2359)  Pulse: 79 (03/21/23 2359)  Resp: 18 (03/21/23 2359)  BP: (!) 185/88 (03/21/23 2359)  SpO2: (!) 93 % (03/21/23 2359)   Vital Signs (24h Range):  Temp:  [97.9 °F (36.6 °C)-98.4 °F (36.9 °C)] 97.9 °F (36.6 °C)  Pulse:  [68-79] 79  Resp:  [16-18] 18  SpO2:  [93 %-96 %] 93 %  BP: (141-199)/() 185/88     Weight: 83.4 kg (183 lb 13.8 oz)  Body mass index is 26.38 kg/m².    Physical Exam  Vitals and nursing note reviewed.   Constitutional:       Appearance: He is well-developed.   HENT:      Head: Normocephalic and atraumatic.      Nose: Nose normal.   Eyes:      General: No scleral icterus.  Cardiovascular:      Rate and Rhythm: Normal rate and regular rhythm.      Heart sounds: Normal heart sounds. No murmur heard.    No friction rub. No gallop.   Pulmonary:      Effort: Pulmonary effort is normal. No respiratory distress.      Breath sounds: Normal breath sounds. No wheezing.   Abdominal:      General: Bowel sounds are normal. There is no distension.      Palpations: Abdomen is soft.      Tenderness: There is no abdominal tenderness.   Musculoskeletal:         General: Normal range of motion.      Cervical back: Normal range of motion and neck supple.   Skin:     General: Skin is warm and dry.      Findings: No rash.   Neurological:      Mental Status: He is alert and oriented to person, place, and time.      Motor: Weakness (Left sided MS 4/5 to UE/BLE) present.      Comments: +left facial droop and Left arm drift    Psychiatric:         Behavior: Behavior normal.         Cognition and Memory: Cognition is impaired (mild).           Significant Labs: All pertinent labs within the past 24 hours have been reviewed.    Significant Imaging: I have reviewed all pertinent imaging results/findings within the past 24 hours.    Assessment/Plan:     * Acute CVA (cerebrovascular accident)    Antithrombotics for secondary stroke prevention: Antiplatelets: Aspirin: 81 mg daily  Clopidogrel: 75 mg daily    Statins for  secondary stroke prevention and hyperlipidemia, if present:   Statins: Atorvastatin- 40 mg daily    Aggressive risk factor modification: HTN     Rehab efforts: The patient has been evaluated by a stroke team provider and the therapy needs have been fully considered based off the presenting complaints and exam findings. The following therapy evaluations are needed: PT evaluate and treat, OT evaluate and treat, SLP evaluate and treat    Diagnostics ordered/pending: Carotid ultrasound to assess vasculature, HgbA1C to assess blood glucose levels, Lipid Profile to assess cholesterol levels, MRI head without contrast to assess brain parenchyma, TTE to assess cardiac function/status , TSH to assess thyroid function    VTE prophylaxis: Enoxaparin 40 mg SQ every 24 hours    BP parameters: TIA: SBP <220 until imaging confirmation of no infarct         Hypertensive urgency, malignant  Patient has a current diagnosis of Hypertensive emergency with end organ damage evidenced by acute ischemic stroke which is controlled.  Latest blood pressure and vitals reviewed-   Temp:  [97.9 °F (36.6 °C)-98.4 °F (36.9 °C)]   Pulse:  [68-79]   Resp:  [16-18]   BP: (141-199)/()   SpO2:  [93 %-96 %] .   Patient currently off IV antihypertensives.   Home meds for hypertension were reviewed and noted below.       Medication adjustment for hospital antihypertensives is as follows- permissive HTN for now   IV Hydralazine for SBP>220, DBP>110    Will aim for controlled BP reduction by medications noted above. Monitor and mitigate end organ damage as indicated.      VTE Risk Mitigation (From admission, onward)         Ordered     enoxaparin injection 40 mg  Daily         03/21/23 2327     IP VTE HIGH RISK PATIENT  Once         03/21/23 2327     Place sequential compression device  Until discontinued         03/21/23 2327                     On 03/22/2023, patient should be placed in hospital observation services under my care in collaboration  with Dr. Dalton.      Jennifer Dinero NP  Department of Hospital Medicine  '[facility]